# Patient Record
Sex: FEMALE | Race: WHITE | NOT HISPANIC OR LATINO | Employment: OTHER | URBAN - METROPOLITAN AREA
[De-identification: names, ages, dates, MRNs, and addresses within clinical notes are randomized per-mention and may not be internally consistent; named-entity substitution may affect disease eponyms.]

---

## 2017-01-03 ENCOUNTER — OFFICE VISIT (OUTPATIENT)
Dept: NEUROSURGERY | Facility: CLINIC | Age: 62
End: 2017-01-03

## 2017-01-03 VITALS
BODY MASS INDEX: 31.01 KG/M2 | SYSTOLIC BLOOD PRESSURE: 110 MMHG | HEIGHT: 55 IN | TEMPERATURE: 97.7 F | DIASTOLIC BLOOD PRESSURE: 58 MMHG | WEIGHT: 134 LBS

## 2017-01-03 DIAGNOSIS — M48.061 SPINAL STENOSIS OF LUMBAR REGION: ICD-10-CM

## 2017-01-03 DIAGNOSIS — M53.9 MULTILEVEL DEGENERATIVE DISC DISEASE: Primary | ICD-10-CM

## 2017-01-03 PROCEDURE — 99203 OFFICE O/P NEW LOW 30 MIN: CPT | Performed by: NEUROLOGICAL SURGERY

## 2017-01-03 NOTE — MR AVS SNAPSHOT
Karen Rubio   1/3/2017 1:15 PM   Office Visit    Dept Phone:  [phone]   Encounter #:  [id number]    Provider:  Loyd Page MD   Department:  [facility]                Your Full Care Plan              Today's Medication Changes          These changes are accurate as of: 1/3/17  3:01 PM.  If you have any questions, ask your nurse or doctor.               Medication(s)that have changed:     gabapentin 300 MG capsule   Commonly known as:  NEURONTIN   TAKE 1 CAPSULE BY MOUTH EVERY 8 HOURS   What changed:  Another medication with the same name was removed. Continue taking this medication, and follow the directions you see here.   Changed by:  SIMA Hammonds         Stop taking medication(s)listed here:     tiZANidine 2 MG tablet   Commonly known as:  ZANAFLEX   Stopped by:  Loyd Page MD                      Your Updated Medication List          This list is accurate as of: 1/3/17  3:01 PM.  Always use your most recent med list.                ALPRAZolam 1 MG tablet   Commonly known as:  XANAX       ARSENIO ASPIRIN EC LOW DOSE 81 MG EC tablet   Generic drug:  aspirin       Blood Pressure Monitor Automat device       cetirizine 10 MG tablet   Commonly known as:  zyrTEC   Take 1 tablet by mouth daily.       escitalopram 10 MG tablet   Commonly known as:  LEXAPRO   Take 1 tablet by mouth daily.       fluticasone 50 MCG/ACT nasal spray   Commonly known as:  FLONASE   1 spray into each nostril Daily.       gabapentin 300 MG capsule   Commonly known as:  NEURONTIN       hydrocortisone 5 MG tablet   Commonly known as:  CORTEF   Take 3 tab in am and 2 tab in pm       ibuprofen 600 MG tablet   Commonly known as:  ADVIL,MOTRIN   Take 1 tablet by mouth Every 6 (Six) Hours As Needed for mild pain (1-3).       levothyroxine 50 MCG tablet   Commonly known as:  SYNTHROID, LEVOTHROID   Take 1 tablet by mouth daily.       lisinopril 10 MG  tablet   Commonly known as:  PRINIVIL,ZESTRIL   Take 1 tablet by mouth daily.       meloxicam 7.5 MG tablet   Commonly known as:  MOBIC   Take 1 tablet by mouth 2 (two) times a day.       montelukast 10 MG tablet   Commonly known as:  SINGULAIR       ondansetron ODT 4 MG disintegrating tablet   Commonly known as:  ZOFRAN-ODT       ONE TOUCH ULTRA TEST test strip   Generic drug:  glucose blood       ONETOUCH DELICA LANCETS 33G misc       oxyCODONE-acetaminophen  MG per tablet   Commonly known as:  PERCOCET       pantoprazole 40 MG EC tablet   Commonly known as:  PROTONIX       simvastatin 20 MG tablet   Commonly known as:  ZOCOR       SITagliptin 100 MG tablet   Commonly known as:  JANUVIA   Take 1 tablet by mouth Daily.       tolterodine 2 MG tablet   Commonly known as:  DETROL       traMADol 50 MG tablet   Commonly known as:  ULTRAM       vitamin D 13148 UNITS capsule capsule   Commonly known as:  ERGOCALCIFEROL   Take 1 capsule by mouth every 7 days.       zolpidem 5 MG tablet   Commonly known as:  AMBIEN               We Performed the Following     CT lumbar spine without contrast     EMG & Nerve Conduction Test     IR MYELOGRAM LUMBAR SPINE     XR hip 1 vw bilateral     XR Spine Lumbar Flex & Ext       You Were Diagnosed With        Codes Comments    Multilevel degenerative disc disease    -  Primary ICD-10-CM: M53.9  ICD-9-CM: 722.6     Spinal stenosis of lumbar region     ICD-10-CM: M48.06  ICD-9-CM: 724.02       Medications to be Given to You by a Medical Professional     Due       Frequency    1/12/2017 cyanocobalamin injection 1,000 mcg  Every 28 Days      Instructions     None    Patient Instructions History      Upcoming Appointments     Visit Type Date Time Department    NEW PATIENT 1/3/2017  1:15 PM [facility]    FOLLOW UP 1/5/2017 12:30 PM [facility]      [facility] Signup     [facility] allows you to send messages to your doctor, view your test results, renew your  "prescriptions, schedule appointments, and more. To sign up, go to [email or web address] and click on the Sign Up Now link in the New User? box. Enter your [employer] Activation Code exactly as it appears below along with the last four digits of your Social Security Number and your Date of Birth () to complete the sign-up process. If you do not sign up before the expiration date, you must request a new code.    [employer] Activation Code: [email or web address]  Expires: 2017  3:01 PM    If you have questions, you can email [email or web address] or call [phone] to talk to our [employer] staff. Remember, [employer] is NOT to be used for urgent needs. For medical emergencies, dial 911.               Other Info from Your Visit           Your Appointments     2017 12:30 PM EST   Follow Up with Aleah THOMAS MD   [facility] (--)    [address] [place] [id number] [phone]           Arrive 15 minutes prior to appointment.              Allergies     Valium [Diazepam]        Reason for Visit     Back Pain           Vital Signs     Blood Pressure Temperature Height    110/58 (BP Location: Left arm, Patient Position: Sitting, Cuff Size: Adult) 97.7 °F (36.5 °C) (Temporal Artery ) 52\" (132.1 cm)    Weight Body Mass Index Smoking Status    134 lb (60.8 kg) 34.84 kg/m2 Never Smoker      Problems and Diagnoses Noted     Multilevel degenerative disc disease    Narrowing of spinal canal            "

## 2017-01-03 NOTE — PROGRESS NOTES
Karen Valencialey  1955  [id number]      Chief Complaint   Patient presents with   • Back Pain       HISTORY OF PRESENT ILLNESS:  This is a 61-year-old female who presents with a history of fibromyalgia but more recently pain in her back rating into her right hip and into the anterior thigh.  She has had diagnostic studies which show the presence of degenerative osteoarthritis throughout the lumbar spine.  There is suggestive of disc protrusion L3/4 to the right associated with the deformity of the vertebral body.     Past Medical History   Diagnosis Date   • Acute bronchitis    • Acute head trauma    • Arthritis    • Arthropathy of ankle and foot    • Arthropathy, multiple sites    • Asthma    • Cancer    • Chronic bronchitis    • Chronic constipation    • Cough    • Depression    • Diabetes mellitus    • Dysuria    • Enthesopathy of ankle/tarsus    • Facial injury    • Fibromyalgia    • Fracture of left fibula    • Gall stone    • GERD (gastroesophageal reflux disease)    • Gout    • Hammer toe    • Heart disease    • Hyperlipidemia    • Idiopathic peripheral neuropathy    • Joint pain of right hip on movement    • Lumbago    • Migraine    • Mixed sensory-motor polyneuropathy    • Myalgia and myositis    • Nausea and vomiting    • Osteoporosis    • Ovarian cancer    • Ovarian cyst    • Overweight    • Pleurisy    • Pustule    • Rash      Last Impression: 09 Feb 2015  Likely contact dermatitis secondary to new clothing- rash has similarity to shingles but distribution does not correspond with shingles. Trial of topical steroid, sent in clobetasol. Refer to derm for f/u if no improvement or worsening of rash.   • Recurrent boils    • Sinusitis, acute    • Stress fracture    • Thyroid disorder    • Tibial plateau fracture, left    • Urinary incontinence    • UTI (urinary tract infection)    • Vitamin B12 deficiency       · Last Impression: 29 Jan 2015  Check labs as ordered. Gave pt vitamin b12 IM in office  today. Will continue monthly shots to maintain level.  Phuong Almaguer (Internal Medicine)   • Vitamin D deficiency        Past Surgical History   Procedure Laterality Date   • Other surgical history       anastomosis of gallbladder   • Ovary surgery     • Tubal abdominal ligation         Family History   Problem Relation Age of Onset   • Hyperlipidemia Mother    • Hypertension Mother    • Thyroid disease Mother    • Hyperlipidemia Father    • Hypertension Father    • Kidney disease Sister    • Thyroid disease Sister    • Thyroid disease Son    • Other Son      Suicide   • Heart attack Other    • Arthritis Other    • Cancer Other    • Mental illness Other    • Migraines Other    • Hyperlipidemia Other    • Hypertension Other    • Stroke Other        Social History     Social History   • Marital status:      Spouse name: N/A   • Number of children: N/A   • Years of education: N/A     Occupational History   • Not on file.     Social History Main Topics   • Smoking status: Never Smoker   • Smokeless tobacco: Never Used   • Alcohol use No   • Drug use: No   • Sexual activity: Not on file     Other Topics Concern   • Not on file     Social History Narrative       Allergies   Allergen Reactions   • Valium [Diazepam]          Current Outpatient Prescriptions:   •  ALPRAZolam (XANAX) 1 MG tablet, Take  by mouth 3 (three) times a day., Disp: , Rfl:   •  aspirin (ARSENIO ASPIRIN EC LOW DOSE) 81 MG EC tablet, Take  by mouth daily., Disp: , Rfl:   •  Blood Pressure Monitoring (BLOOD PRESSURE MONITOR AUTOMAT) device, , Disp: , Rfl:   •  cetirizine (ZyrTEC) 10 MG tablet, Take 1 tablet by mouth daily., Disp: 30 tablet, Rfl: 11  •  escitalopram (LEXAPRO) 10 MG tablet, Take 1 tablet by mouth daily., Disp: 30 tablet, Rfl: 11  •  fluticasone (FLONASE) 50 MCG/ACT nasal spray, 1 spray into each nostril Daily., Disp: 1 each, Rfl: 2  •  gabapentin (NEURONTIN) 300 MG capsule, TAKE 1 CAPSULE BY MOUTH EVERY 8 HOURS, Disp: , Rfl: 1  •   glucose blood (ONE TOUCH ULTRA TEST) test strip, 1-2 times a day, Disp: , Rfl:   •  hydrocortisone (CORTEF) 5 MG tablet, Take 3 tab in am and 2 tab in pm, Disp: 150 tablet, Rfl: 5  •  ibuprofen (ADVIL,MOTRIN) 600 MG tablet, Take 1 tablet by mouth Every 6 (Six) Hours As Needed for mild pain (1-3)., Disp: 30 tablet, Rfl: 5  •  levothyroxine (SYNTHROID, LEVOTHROID) 50 MCG tablet, Take 1 tablet by mouth daily., Disp: 30 tablet, Rfl: 11  •  lisinopril (PRINIVIL,ZESTRIL) 10 MG tablet, Take 1 tablet by mouth daily., Disp: 30 tablet, Rfl: 11  •  meloxicam (MOBIC) 7.5 MG tablet, Take 1 tablet by mouth 2 (two) times a day., Disp: 20 tablet, Rfl: 2  •  montelukast (SINGULAIR) 10 MG tablet, Take  by mouth., Disp: , Rfl:   •  ondansetron ODT (ZOFRAN-ODT) 4 MG disintegrating tablet, Take  by mouth., Disp: , Rfl:   •  ONETOUCH DELICA LANCETS 33G misc, Checks x 1 / day, Disp: , Rfl:   •  oxyCODONE-acetaminophen (PERCOCET)  MG per tablet, Take  by mouth., Disp: , Rfl:   •  pantoprazole (PROTONIX) 40 MG EC tablet, Take  by mouth., Disp: , Rfl:   •  simvastatin (ZOCOR) 20 MG tablet, Take  by mouth., Disp: , Rfl:   •  sitaGLIPtin (JANUVIA) 100 MG tablet, Take 1 tablet by mouth Daily., Disp: 30 tablet, Rfl: 11  •  tolterodine (DETROL) 2 MG tablet, Take  by mouth., Disp: , Rfl:   •  traMADol (ULTRAM) 50 MG tablet, TAKE 1 TABLET BY MOUTH EVERY 8 HOURS, Disp: , Rfl: 1  •  vitamin D (ERGOCALCIFEROL) 98462 UNITS capsule capsule, Take 1 capsule by mouth every 7 days., Disp: 4 capsule, Rfl: 11  •  zolpidem (AMBIEN) 5 MG tablet, TK 1 T PO HS, Disp: , Rfl: 1    Current Facility-Administered Medications:   •  cyanocobalamin injection 1,000 mcg, 1,000 mcg, Intramuscular, Q28 Days, Aleah THOMAS MD, 1,000 mcg at 07/15/16 1159  •  cyanocobalamin injection 1,000 mcg, 1,000 mcg, Intramuscular, Q28 Days, Aleah THOMAS MD, 1,000 mcg at 12/16/16 1603    Review of Systems   Constitutional: Positive for activity change, appetite change, chills,  "diaphoresis, fatigue and fever.   HENT: Positive for hearing loss, nosebleeds and voice change.    Eyes: Positive for redness and visual disturbance.   Gastrointestinal: Positive for constipation and nausea.   Endocrine: Positive for cold intolerance, heat intolerance, polydipsia and polyuria.   Genitourinary: Positive for dyspareunia, frequency and urgency.   Allergic/Immunologic: Positive for environmental allergies.   Neurological: Positive for dizziness, speech difficulty, weakness and numbness.   Hematological: Bruises/bleeds easily.   Psychiatric/Behavioral: Positive for agitation, confusion, decreased concentration and dysphoric mood. The patient is nervous/anxious.    All other systems reviewed and are negative.      Neurological Examination:    Vitals:    01/03/17 1405   BP: 110/58   BP Location: Left arm   Patient Position: Sitting   Cuff Size: Adult   Temp: 97.7 °F (36.5 °C)   TempSrc: Temporal Artery    Weight: 134 lb (60.8 kg)   Height: 52\" (132.1 cm)       Mental status/speech: The patient is alert and oriented.  Speech is clear without aphysia or dysarthria.  No overt cognitive deficits.    Cranial nerve examination:    Olfaction: Smell is intact.  Vision: Vision is intact without visual field abnormalities.  Funduscopic examination is normal.  No pupillary irregularity.  Ocular motor examination: The extraocular muscles are intact.  There is no diplopia.  The pupil is round and reactive to both light and accommodation.  There is no nystagmus.  Facial movement/sensation: There is no facial weakness.  Sensation is intact in the first, second, and third divisions of the trigeminal nerve.  The corneal reflex is intact.  Auditory: Hearing is intact to finger rub bilaterally.  Cranial nerves IX, X, XI, XII: Phonation is normal.  No dysphagia.  Tongue is protruded in the midline without atrophy.  The gag reflex is intact.  Shoulder shrug is normal.    Musculoligamentous ligamentous examination: [She has " marketed decreased range of motion of lumbar spine.  Straight leg raising [place] flip test are positive on the right side.  Her strength however is quite good and there is no reflex asymmetry.  This includes patellar reflex.  She has spotty sensory loss bilaterally. ]    Medical Decision Making:     Diagnostic Data Set:  MRI has revealed. atypical L2-L3 bony fusion with a bony fragment projecting posteriorly compromising the neural canal resulting in mild centralspinal stenosis and bilateral lateral recess stenosis. There is also  compromise of the right lateral recess at L4-L5. There has been no change since the previous examination of 03/23/2016       Assessment:  [ Nerve root entrapment L4/L5]          Recommendations:  [ Further studies are warranted to include myelogram post-Monogram CT scan and EMG and NCV and flexion extension x-rays.  It would appear that she has advanced degenerative osteoarthritis throughout the lumbar spine.  I believe the symptoms that she is having at this time of pain in her right hip may be the results and of disc protrusion and compromise at L3/4 and L4/5.  We will review the studies and keep you informed.]        I greatly appreciate the opportunity to see and evaluate this individual.  If you have questions or concerns regarding issues that I may have overlooked please call me at any time: [phone].  Colt Page M.D.  [facility]  [address]  [place] [address]    Scribed for Loyd Page MD by Sandoval Briseno CMA. 1/3/2017  2:09 PM    I have read and concur with the information provided by the scribe.

## 2017-01-03 NOTE — LETTER
January 3, 2017     SIMA Hammonds  [address] [facility]  [address]  [facility] [id number]    Patient: Karen Rubio   YOB: 1955   Date of Visit: 1/3/2017       Dear SIMA Dubon:    Karen Rubio was in my office today. Below is a copy of my note.    If you have questions, please do not hesitate to call me. I look forward to following Karen along with you.         Sincerely,        Loyd Page MD        CC: No Recipients    Karen Rubio  1955  [id number]      Chief Complaint   Patient presents with   • Back Pain       HISTORY OF PRESENT ILLNESS:  This is a 61-year-old female who presents with a history of fibromyalgia but more recently pain in her back rating into her right hip and into the anterior thigh.  She has had diagnostic studies which show the presence of degenerative osteoarthritis throughout the lumbar spine.  There is suggestive of disc protrusion L3/4 to the right associated with the deformity of the vertebral body.     Past Medical History   Diagnosis Date   • Acute bronchitis    • Acute head trauma    • Arthritis    • Arthropathy of ankle and foot    • Arthropathy, multiple sites    • Asthma    • Cancer    • Chronic bronchitis    • Chronic constipation    • Cough    • Depression    • Diabetes mellitus    • Dysuria    • Enthesopathy of ankle/tarsus    • Facial injury    • Fibromyalgia    • Fracture of left fibula    • Gall stone    • GERD (gastroesophageal reflux disease)    • Gout    • Hammer toe    • Heart disease    • Hyperlipidemia    • Idiopathic peripheral neuropathy    • Joint pain of right hip on movement    • Lumbago    • Migraine    • Mixed sensory-motor polyneuropathy    • Myalgia and myositis    • Nausea and vomiting    • Osteoporosis    • Ovarian cancer    • Ovarian cyst    • Overweight    • Pleurisy    • Pustule    • Rash      Last Impression: 09 Feb 2015  Likely contact dermatitis secondary to new clothing- rash has similarity to  shingles but distribution does not correspond with shingles. Trial of topical steroid, sent in clobetasol. Refer to derm for f/u if no improvement or worsening of rash.   • Recurrent boils    • Sinusitis, acute    • Stress fracture    • Thyroid disorder    • Tibial plateau fracture, left    • Urinary incontinence    • UTI (urinary tract infection)    • Vitamin B12 deficiency       · Last Impression: 29 Jan 2015  Check labs as ordered. Gave pt vitamin b12 IM in office today. Will continue monthly shots to maintain level.  Phuong Almaguer (Internal Medicine)   • Vitamin D deficiency        Past Surgical History   Procedure Laterality Date   • Other surgical history       anastomosis of gallbladder   • Ovary surgery     • Tubal abdominal ligation         Family History   Problem Relation Age of Onset   • Hyperlipidemia Mother    • Hypertension Mother    • Thyroid disease Mother    • Hyperlipidemia Father    • Hypertension Father    • Kidney disease Sister    • Thyroid disease Sister    • Thyroid disease Son    • Other Son      Suicide   • Heart attack Other    • Arthritis Other    • Cancer Other    • Mental illness Other    • Migraines Other    • Hyperlipidemia Other    • Hypertension Other    • Stroke Other        Social History     Social History   • Marital status:      Spouse name: N/A   • Number of children: N/A   • Years of education: N/A     Occupational History   • Not on file.     Social History Main Topics   • Smoking status: Never Smoker   • Smokeless tobacco: Never Used   • Alcohol use No   • Drug use: No   • Sexual activity: Not on file     Other Topics Concern   • Not on file     Social History Narrative       Allergies   Allergen Reactions   • Valium [Diazepam]          Current Outpatient Prescriptions:   •  ALPRAZolam (XANAX) 1 MG tablet, Take  by mouth 3 (three) times a day., Disp: , Rfl:   •  aspirin (ARSENIO ASPIRIN EC LOW DOSE) 81 MG EC tablet, Take  by mouth daily., Disp: , Rfl:   •  Blood  Pressure Monitoring (BLOOD PRESSURE MONITOR AUTOMAT) device, , Disp: , Rfl:   •  cetirizine (ZyrTEC) 10 MG tablet, Take 1 tablet by mouth daily., Disp: 30 tablet, Rfl: 11  •  escitalopram (LEXAPRO) 10 MG tablet, Take 1 tablet by mouth daily., Disp: 30 tablet, Rfl: 11  •  fluticasone (FLONASE) 50 MCG/ACT nasal spray, 1 spray into each nostril Daily., Disp: 1 each, Rfl: 2  •  gabapentin (NEURONTIN) 300 MG capsule, TAKE 1 CAPSULE BY MOUTH EVERY 8 HOURS, Disp: , Rfl: 1  •  glucose blood (ONE TOUCH ULTRA TEST) test strip, 1-2 times a day, Disp: , Rfl:   •  hydrocortisone (CORTEF) 5 MG tablet, Take 3 tab in am and 2 tab in pm, Disp: 150 tablet, Rfl: 5  •  ibuprofen (ADVIL,MOTRIN) 600 MG tablet, Take 1 tablet by mouth Every 6 (Six) Hours As Needed for mild pain (1-3)., Disp: 30 tablet, Rfl: 5  •  levothyroxine (SYNTHROID, LEVOTHROID) 50 MCG tablet, Take 1 tablet by mouth daily., Disp: 30 tablet, Rfl: 11  •  lisinopril (PRINIVIL,ZESTRIL) 10 MG tablet, Take 1 tablet by mouth daily., Disp: 30 tablet, Rfl: 11  •  meloxicam (MOBIC) 7.5 MG tablet, Take 1 tablet by mouth 2 (two) times a day., Disp: 20 tablet, Rfl: 2  •  montelukast (SINGULAIR) 10 MG tablet, Take  by mouth., Disp: , Rfl:   •  ondansetron ODT (ZOFRAN-ODT) 4 MG disintegrating tablet, Take  by mouth., Disp: , Rfl:   •  ONETOUCH DELICA LANCETS 33G misc, Checks x 1 / day, Disp: , Rfl:   •  oxyCODONE-acetaminophen (PERCOCET)  MG per tablet, Take  by mouth., Disp: , Rfl:   •  pantoprazole (PROTONIX) 40 MG EC tablet, Take  by mouth., Disp: , Rfl:   •  simvastatin (ZOCOR) 20 MG tablet, Take  by mouth., Disp: , Rfl:   •  sitaGLIPtin (JANUVIA) 100 MG tablet, Take 1 tablet by mouth Daily., Disp: 30 tablet, Rfl: 11  •  tolterodine (DETROL) 2 MG tablet, Take  by mouth., Disp: , Rfl:   •  traMADol (ULTRAM) 50 MG tablet, TAKE 1 TABLET BY MOUTH EVERY 8 HOURS, Disp: , Rfl: 1  •  vitamin D (ERGOCALCIFEROL) 22215 UNITS capsule capsule, Take 1 capsule by mouth every 7 days.,  "Disp: 4 capsule, Rfl: 11  •  zolpidem (AMBIEN) 5 MG tablet, TK 1 T PO HS, Disp: , Rfl: 1    Current Facility-Administered Medications:   •  cyanocobalamin injection 1,000 mcg, 1,000 mcg, Intramuscular, Q28 Days, Aleah THOMAS MD, 1,000 mcg at 07/15/16 1159  •  cyanocobalamin injection 1,000 mcg, 1,000 mcg, Intramuscular, Q28 Days, Aleah THOMAS MD, 1,000 mcg at 12/16/16 1603    Review of Systems   Constitutional: Positive for activity change, appetite change, chills, diaphoresis, fatigue and fever.   HENT: Positive for hearing loss, nosebleeds and voice change.    Eyes: Positive for redness and visual disturbance.   Gastrointestinal: Positive for constipation and nausea.   Endocrine: Positive for cold intolerance, heat intolerance, polydipsia and polyuria.   Genitourinary: Positive for dyspareunia, frequency and urgency.   Allergic/Immunologic: Positive for environmental allergies.   Neurological: Positive for dizziness, speech difficulty, weakness and numbness.   Hematological: Bruises/bleeds easily.   Psychiatric/Behavioral: Positive for agitation, confusion, decreased concentration and dysphoric mood. The patient is nervous/anxious.    All other systems reviewed and are negative.      Neurological Examination:    Vitals:    01/03/17 1405   BP: 110/58   BP Location: Left arm   Patient Position: Sitting   Cuff Size: Adult   Temp: 97.7 °F (36.5 °C)   TempSrc: Temporal Artery    Weight: 134 lb (60.8 kg)   Height: 52\" (132.1 cm)       Mental status/speech: The patient is alert and oriented.  Speech is clear without aphysia or dysarthria.  No overt cognitive deficits.    Cranial nerve examination:    Olfaction: Smell is intact.  Vision: Vision is intact without visual field abnormalities.  Funduscopic examination is normal.  No pupillary irregularity.  Ocular motor examination: The extraocular muscles are intact.  There is no diplopia.  The pupil is round and reactive to both light and accommodation.  There is no " nystagmus.  Facial movement/sensation: There is no facial weakness.  Sensation is intact in the first, second, and third divisions of the trigeminal nerve.  The corneal reflex is intact.  Auditory: Hearing is intact to finger rub bilaterally.  Cranial nerves IX, X, XI, XII: Phonation is normal.  No dysphagia.  Tongue is protruded in the midline without atrophy.  The gag reflex is intact.  Shoulder shrug is normal.    Musculoligamentous ligamentous examination: [She has marketed decreased range of motion of lumbar spine.  Straight leg raising Canon flip test are positive on the right side.  Her strength however is quite good and there is no reflex asymmetry.  This includes patellar reflex.  She has spotty sensory loss bilaterally. ]    Medical Decision Making:     Diagnostic Data Set:  MRI has revealed. atypical L2-L3 bony fusion with a bony fragment projecting posteriorly compromising the neural canal resulting in mild centralspinal stenosis and bilateral lateral recess stenosis. There is also  compromise of the right lateral recess at L4-L5. There has been no change since the previous examination of 03/23/2016       Assessment:  [ Nerve root entrapment L4/L5]          Recommendations:  [ Further studies are warranted to include myelogram post-Monogram CT scan and EMG and NCV and flexion extension x-rays.  It would appear that she has advanced degenerative osteoarthritis throughout the lumbar spine.  I believe the symptoms that she is having at this time of pain in her right hip may be the results and of disc protrusion and compromise at L3/4 and L4/5.  We will review the studies and keep you informed.]        I greatly appreciate the opportunity to see and evaluate this individual.  If you have questions or concerns regarding issues that I may have overlooked please call me at any time: [phone].  Colt Pgae M.D.  [facility]  [address] [place] [address].  [place] [address]    Scribed for  Loyd Page MD by Sandoval Briseno CMA. 1/3/2017  2:09 PM    I have read and concur with the information provided by the scribe.

## 2017-01-20 ENCOUNTER — HOSPITAL ENCOUNTER (OUTPATIENT)
Dept: INTERVENTIONAL RADIOLOGY/VASCULAR | Facility: HOSPITAL | Age: 62
End: 2017-01-20
Attending: NEUROLOGICAL SURGERY

## 2017-01-20 ENCOUNTER — APPOINTMENT (OUTPATIENT)
Dept: NEUROLOGY | Facility: HOSPITAL | Age: 62
End: 2017-01-20
Attending: NEUROLOGICAL SURGERY

## 2017-02-16 ENCOUNTER — OFFICE VISIT (OUTPATIENT)
Dept: FAMILY MEDICINE CLINIC | Facility: CLINIC | Age: 62
End: 2017-02-16

## 2017-02-16 VITALS
DIASTOLIC BLOOD PRESSURE: 50 MMHG | HEIGHT: 55 IN | OXYGEN SATURATION: 98 % | BODY MASS INDEX: 30.78 KG/M2 | HEART RATE: 68 BPM | WEIGHT: 133 LBS | SYSTOLIC BLOOD PRESSURE: 90 MMHG

## 2017-02-16 DIAGNOSIS — R53.83 FATIGUE, UNSPECIFIED TYPE: Primary | ICD-10-CM

## 2017-02-16 DIAGNOSIS — F41.9 ANXIETY: ICD-10-CM

## 2017-02-16 DIAGNOSIS — R06.2 WHEEZING: ICD-10-CM

## 2017-02-16 PROCEDURE — 99213 OFFICE O/P EST LOW 20 MIN: CPT | Performed by: PHYSICIAN ASSISTANT

## 2017-02-16 RX ORDER — ALBUTEROL SULFATE 90 UG/1
2 AEROSOL, METERED RESPIRATORY (INHALATION) EVERY 4 HOURS PRN
Qty: 3.7 G | Refills: 6 | Status: SHIPPED | OUTPATIENT
Start: 2017-02-16 | End: 2017-08-30 | Stop reason: SDUPTHER

## 2017-02-16 NOTE — PROGRESS NOTES
Subjective   Karen Rubio is a 61 y.o. female    History of Present Illness  Age is 59-year-old white female comes in complaining of some fatigue, low blood pressure, she states her blood pressures been running about 90/50, she states she has no energy during the day like to back off some of her blood pressure medication.  She has some dizziness when she gets up out of a chair.    Patient comes in for follow-up of anxiety needs refill of Xanax, doing well medication meds working well patient is stable.    Patient also comes in for follow-up occasional wheezing, she has history of mild persistent asthma, takes albuterol inhaler with her she only uses it maybe once twice every 2 months needs refill patient is stable      The following portions of the patient's history were reviewed and updated as appropriate: allergies, current medications, past social history and problem list    Review of Systems   Constitutional: Positive for fatigue. Negative for appetite change, diaphoresis, fever and unexpected weight change.   Eyes: Negative for visual disturbance.   Respiratory: Negative for chest tightness and shortness of breath.    Cardiovascular: Negative for chest pain, palpitations and leg swelling.   Gastrointestinal: Negative for diarrhea, nausea and vomiting.   Endocrine: Negative for polydipsia, polyphagia and polyuria.   Skin: Negative for color change.   Neurological: Negative for dizziness, weakness, light-headedness and numbness.       Objective     Vitals:    02/16/17 1003   BP: 90/50   Pulse: 68   SpO2: 98%       Physical Exam   Constitutional: She appears well-developed and well-nourished.   Neck: No JVD present.   Cardiovascular: Normal rate, regular rhythm, normal heart sounds, intact distal pulses and normal pulses.    No murmur heard.  Pulmonary/Chest: Effort normal and breath sounds normal. No respiratory distress.   Abdominal: Soft. Bowel sounds are normal. There is no hepatosplenomegaly. There is no  tenderness.   Musculoskeletal: She exhibits no edema.   Skin: Skin is warm and dry.   Nursing note and vitals reviewed.      Assessment/Plan     Diagnoses and all orders for this visit:    Fatigue, unspecified type    Anxiety    Wheezing  -     albuterol (PROVENTIL HFA;VENTOLIN HFA) 108 (90 BASE) MCG/ACT inhaler; Inhale 2 puffs Every 4 (Four) Hours As Needed for wheezing.     #1 DC lisinopril 10 mg, check blood pressure every 2-3 days, it pressure increases please call office and we will start lisinopril.    #2 refill Xanax 1 mg 13 times a day dispense 90×4 refills    #3 albuterol inhaler use as directed when necessary ×5 refills

## 2017-02-24 ENCOUNTER — OFFICE VISIT (OUTPATIENT)
Dept: ENDOCRINOLOGY | Facility: CLINIC | Age: 62
End: 2017-02-24

## 2017-02-24 VITALS
HEIGHT: 55 IN | OXYGEN SATURATION: 98 % | SYSTOLIC BLOOD PRESSURE: 122 MMHG | BODY MASS INDEX: 31.66 KG/M2 | DIASTOLIC BLOOD PRESSURE: 66 MMHG | WEIGHT: 136.8 LBS | HEART RATE: 70 BPM

## 2017-02-24 DIAGNOSIS — E55.9 VITAMIN D DEFICIENCY: ICD-10-CM

## 2017-02-24 DIAGNOSIS — E27.1 ADDISON'S DISEASE (HCC): ICD-10-CM

## 2017-02-24 DIAGNOSIS — E53.8 COBALAMIN DEFICIENCY: ICD-10-CM

## 2017-02-24 DIAGNOSIS — E53.8 VITAMIN B12 DEFICIENCY: ICD-10-CM

## 2017-02-24 DIAGNOSIS — E61.1 IRON DEFICIENCY: ICD-10-CM

## 2017-02-24 DIAGNOSIS — E89.0 POSTABLATIVE HYPOTHYROIDISM: ICD-10-CM

## 2017-02-24 DIAGNOSIS — IMO0001 UNCONTROLLED TYPE 2 DIABETES MELLITUS WITHOUT COMPLICATION, WITHOUT LONG-TERM CURRENT USE OF INSULIN: Primary | ICD-10-CM

## 2017-02-24 LAB
25(OH)D3 SERPL-MCNC: 53.2 NG/ML
ANION GAP SERPL CALCULATED.3IONS-SCNC: 3 MMOL/L (ref 3–11)
BASOPHILS # BLD AUTO: 0.01 10*3/MM3 (ref 0–0.2)
BASOPHILS NFR BLD AUTO: 0.1 % (ref 0–1)
BUN BLD-MCNC: 13 MG/DL (ref 9–23)
BUN/CREAT SERPL: 13 (ref 7–25)
CALCIUM SPEC-SCNC: 9.4 MG/DL (ref 8.7–10.4)
CHLORIDE SERPL-SCNC: 105 MMOL/L (ref 99–109)
CO2 SERPL-SCNC: 27 MMOL/L (ref 20–31)
CREAT BLD-MCNC: 1 MG/DL (ref 0.6–1.3)
DEPRECATED RDW RBC AUTO: 39 FL (ref 37–54)
EOSINOPHIL # BLD AUTO: 0.26 10*3/MM3 (ref 0.1–0.3)
EOSINOPHIL NFR BLD AUTO: 3.2 % (ref 0–3)
ERYTHROCYTE [DISTWIDTH] IN BLOOD BY AUTOMATED COUNT: 12.5 % (ref 11.3–14.5)
GFR SERPL CREATININE-BSD FRML MDRD: 56 ML/MIN/1.73
GLUCOSE BLD-MCNC: 176 MG/DL (ref 70–100)
GLUCOSE BLDC GLUCOMTR-MCNC: 170 MG/DL (ref 70–130)
HBA1C MFR BLD: 6.3 %
HCT VFR BLD AUTO: 36.3 % (ref 34.5–44)
HGB BLD-MCNC: 12.5 G/DL (ref 11.5–15.5)
IMM GRANULOCYTES # BLD: 0.07 10*3/MM3 (ref 0–0.03)
IMM GRANULOCYTES NFR BLD: 0.9 % (ref 0–0.6)
IRON 24H UR-MRATE: 83 MCG/DL (ref 50–175)
LYMPHOCYTES # BLD AUTO: 1.66 10*3/MM3 (ref 0.6–4.8)
LYMPHOCYTES NFR BLD AUTO: 20.2 % (ref 24–44)
MCH RBC QN AUTO: 29.8 PG (ref 27–31)
MCHC RBC AUTO-ENTMCNC: 34.4 G/DL (ref 32–36)
MCV RBC AUTO: 86.6 FL (ref 80–99)
MONOCYTES # BLD AUTO: 0.58 10*3/MM3 (ref 0–1)
MONOCYTES NFR BLD AUTO: 7.1 % (ref 0–12)
NEUTROPHILS # BLD AUTO: 5.64 10*3/MM3 (ref 1.5–8.3)
NEUTROPHILS NFR BLD AUTO: 68.5 % (ref 41–71)
PLATELET # BLD AUTO: 201 10*3/MM3 (ref 150–450)
PMV BLD AUTO: 9.5 FL (ref 6–12)
POTASSIUM BLD-SCNC: 5.9 MMOL/L (ref 3.5–5.5)
RBC # BLD AUTO: 4.19 10*6/MM3 (ref 3.89–5.14)
SODIUM BLD-SCNC: 135 MMOL/L (ref 132–146)
T4 FREE SERPL-MCNC: 0.94 NG/DL (ref 0.89–1.76)
TSH SERPL DL<=0.05 MIU/L-ACNC: 1.68 MIU/ML (ref 0.35–5.35)
WBC NRBC COR # BLD: 8.22 10*3/MM3 (ref 3.5–10.8)

## 2017-02-24 PROCEDURE — 80048 BASIC METABOLIC PNL TOTAL CA: CPT | Performed by: INTERNAL MEDICINE

## 2017-02-24 PROCEDURE — 99215 OFFICE O/P EST HI 40 MIN: CPT | Performed by: INTERNAL MEDICINE

## 2017-02-24 PROCEDURE — 84443 ASSAY THYROID STIM HORMONE: CPT | Performed by: INTERNAL MEDICINE

## 2017-02-24 PROCEDURE — 96372 THER/PROPH/DIAG INJ SC/IM: CPT | Performed by: INTERNAL MEDICINE

## 2017-02-24 PROCEDURE — 84439 ASSAY OF FREE THYROXINE: CPT | Performed by: INTERNAL MEDICINE

## 2017-02-24 PROCEDURE — 85025 COMPLETE CBC W/AUTO DIFF WBC: CPT | Performed by: INTERNAL MEDICINE

## 2017-02-24 PROCEDURE — 83540 ASSAY OF IRON: CPT | Performed by: INTERNAL MEDICINE

## 2017-02-24 PROCEDURE — 82947 ASSAY GLUCOSE BLOOD QUANT: CPT | Performed by: INTERNAL MEDICINE

## 2017-02-24 PROCEDURE — 83036 HEMOGLOBIN GLYCOSYLATED A1C: CPT | Performed by: INTERNAL MEDICINE

## 2017-02-24 PROCEDURE — 82306 VITAMIN D 25 HYDROXY: CPT | Performed by: INTERNAL MEDICINE

## 2017-02-24 RX ORDER — CYANOCOBALAMIN 1000 UG/ML
INJECTION, SOLUTION INTRAMUSCULAR; SUBCUTANEOUS
Qty: 10 ML | Refills: 1 | Status: SHIPPED | OUTPATIENT
Start: 2017-02-24 | End: 2017-10-19 | Stop reason: SDUPTHER

## 2017-02-24 RX ORDER — CYANOCOBALAMIN 1000 UG/ML
1000 INJECTION, SOLUTION INTRAMUSCULAR; SUBCUTANEOUS
Status: DISCONTINUED | OUTPATIENT
Start: 2017-02-24 | End: 2019-06-28

## 2017-02-24 RX ADMIN — CYANOCOBALAMIN 1000 MCG: 1000 INJECTION, SOLUTION INTRAMUSCULAR; SUBCUTANEOUS at 16:28

## 2017-02-24 NOTE — PROGRESS NOTES
Chief complaint  Diabetes (F/u for type 2 diabetes, pt stated she has not been checking blood sugars)    Subjective   Karen Rubio is a 61 y.o. female is here today for follow-up.  Follow-up for hypothyroidism and Shawn's disease. HTN and Depression. Diabetes.   Adrenal insufficiency / [place]'s disease dx in 2004. She had significant hyperpigmentation and fatigue.   She was taking hydrocortisone and fludrocortisone. On presentation in Oct 2014 and following visit BP was elevated 180//100 and I have d/c fludrocortisone. BP stabilized. She reported low-normal BP and she stopped lisinopril.     Diabetes mellitus type 2   She stopped metformin because of diarrhea. It was well controlled with diet and last visit A1C was 9.2  I have started Januvia 100 mg daily. She reported low glucose almost daily, falls asleep during the       Hypothyroidism postablative. S/p I-131 therapy at age 14. She is taking levothyroxine 50 mcg.     She c/o significant fatigue and lethargy. Back pain and right hip pain.   Reported feeling awful, frequent headaches,  generalized pain. C/o sever back pain and spasms in the muscles. On further questioning we have discovered that she was not taking her usual hydrocortisone dose as supposed to and only took 10 mg in am and 5 mg in pm.        Diabetes         Medications    Current Outpatient Prescriptions:   •  albuterol (PROVENTIL HFA;VENTOLIN HFA) 108 (90 BASE) MCG/ACT inhaler, Inhale 2 puffs Every 4 (Four) Hours As Needed for wheezing., Disp: 3.7 g, Rfl: 6  •  ALPRAZolam (XANAX) 1 MG tablet, Take 1 mg by mouth 3 (Three) Times a Day., Disp: , Rfl:   •  aspirin (ARSENIO ASPIRIN EC LOW DOSE) 81 MG EC tablet, Take 81 mg by mouth Daily., Disp: , Rfl:   •  cetirizine (ZyrTEC) 10 MG tablet, Take 1 tablet by mouth daily., Disp: 30 tablet, Rfl: 11  •  escitalopram (LEXAPRO) 10 MG tablet, Take 1 tablet by mouth daily., Disp: 30 tablet, Rfl: 11  •  fluticasone (FLONASE) 50 MCG/ACT nasal spray, 1  spray into each nostril Daily., Disp: 1 each, Rfl: 2  •  gabapentin (NEURONTIN) 300 MG capsule, TAKE 1 CAPSULE BY MOUTH EVERY 8 HOURS, Disp: , Rfl: 1  •  glucose blood (ONE TOUCH ULTRA TEST) test strip, 1 each by Other route As Needed. 1-2 times a day, Disp: , Rfl:   •  hydrocortisone (CORTEF) 5 MG tablet, Take 3 tab in am and 2 tab in pm (Patient taking differently: Take 5 mg by mouth See Admin Instructions. Take 3 tab in am and 2 tab in pm ), Disp: 150 tablet, Rfl: 5  •  ibuprofen (ADVIL,MOTRIN) 600 MG tablet, Take 1 tablet by mouth Every 6 (Six) Hours As Needed for mild pain (1-3)., Disp: 30 tablet, Rfl: 5  •  levothyroxine (SYNTHROID, LEVOTHROID) 50 MCG tablet, Take 1 tablet by mouth daily., Disp: 30 tablet, Rfl: 11  •  meloxicam (MOBIC) 7.5 MG tablet, Take 1 tablet by mouth 2 (two) times a day., Disp: 20 tablet, Rfl: 2  •  montelukast (SINGULAIR) 10 MG tablet, Take 10 mg by mouth Every Night., Disp: , Rfl:   •  ondansetron ODT (ZOFRAN-ODT) 4 MG disintegrating tablet, Take  by mouth., Disp: , Rfl:   •  ONETOUCH DELICA LANCETS 33G misc, Checks x 1 / day, Disp: , Rfl:   •  oxyCODONE-acetaminophen (PERCOCET)  MG per tablet, Take 1 tablet by mouth 4 (Four) Times a Day., Disp: , Rfl:   •  pantoprazole (PROTONIX) 40 MG EC tablet, Take 40 mg by mouth Daily., Disp: , Rfl:   •  simvastatin (ZOCOR) 20 MG tablet, Take 20 mg by mouth Every Night., Disp: , Rfl:   •  sitaGLIPtin (JANUVIA) 100 MG tablet, Take 1 tablet by mouth Daily., Disp: 30 tablet, Rfl: 11  •  tolterodine (DETROL) 2 MG tablet, Take 2 mg by mouth., Disp: , Rfl:   •  traMADol (ULTRAM) 50 MG tablet, TAKE 1 TABLET BY MOUTH EVERY 8 HOURS, Disp: , Rfl: 1  •  vitamin D (ERGOCALCIFEROL) 67500 UNITS capsule capsule, Take 1 capsule by mouth every 7 days., Disp: 4 capsule, Rfl: 11  •  zolpidem (AMBIEN) 5 MG tablet, TK 1 T PO HS, Disp: , Rfl: 1    Current Facility-Administered Medications:   •  cyanocobalamin injection 1,000 mcg, 1,000 mcg, Intramuscular, Q28  "Aleah Coon MD, 1,000 mcg at 12/16/16 1603    PMH  The following portions of the patient's history were reviewed and updated as appropriate: allergies, current medications, past family history, past medical history, past social history, past surgical history and problem list.    Review of systems  Review of Systems   Constitutional: Positive for appetite change (improved appetite. ) and unexpected weight change (weight gain). Negative for activity change, chills and diaphoresis.   HENT: Positive for postnasal drip, rhinorrhea, sinus pressure and sore throat. Negative for congestion, ear pain, facial swelling, hearing loss, trouble swallowing and voice change.    Eyes: Negative.  Negative for redness, itching and visual disturbance.   Respiratory: Positive for cough and shortness of breath.    Cardiovascular: Negative for palpitations and leg swelling.   Gastrointestinal: Negative for abdominal distention, abdominal pain, constipation, diarrhea, nausea and vomiting.   Endocrine:        As listed in HPI   Genitourinary: Negative.    Musculoskeletal: Positive for arthralgias and back pain (radiating to the left leg. ). Negative for joint swelling, myalgias, neck pain and neck stiffness.   Skin: Negative.    Allergic/Immunologic: Negative.    Neurological: Positive for light-headedness and numbness. Negative for syncope.   Hematological: Negative.    Psychiatric/Behavioral: Positive for decreased concentration.   All other systems reviewed and are negative.      Physical exam  Objective   Blood pressure 122/66, pulse 70, height 52.5\" (133.4 cm), weight 136 lb 12.8 oz (62.1 kg), SpO2 98 %.   Physical Exam   Constitutional: She is oriented to person, place, and time. She appears well-developed and well-nourished.   HENT:   Head: Normocephalic and atraumatic.   Nose: Mucosal edema present. Right sinus exhibits maxillary sinus tenderness. Left sinus exhibits maxillary sinus tenderness.   Mouth/Throat: Mucous " membranes are normal. Oropharyngeal exudate present.   Eyes: Conjunctivae are normal.   Neck: No thyromegaly present.   The neck is supple and symmetric   Cardiovascular: Normal rate, regular rhythm and normal heart sounds.    Pulmonary/Chest: Effort normal and breath sounds normal.   Musculoskeletal: She exhibits deformity. She exhibits no edema.   Increase curvature of the spine.    Lymphadenopathy:     She has no cervical adenopathy.   Neurological: She is alert and oriented to person, place, and time.   Skin: Skin is warm and dry. No rash noted.   Psychiatric: She has a normal mood and affect. Thought content normal.   Vitals reviewed.        LABS AND IMAGING  Office Visit on 02/24/2017   Component Date Value Ref Range Status   • Glucose 02/24/2017 170* 70 - 130 mg/dL Final   • Hemoglobin A1C 02/24/2017 6.3  % Final           Assessment  Assessment/Plan   1. Uncontrolled type 2 diabetes mellitus without complication, without long-term current use of insulin    2. Cobalamin deficiency    3. Vitamin D deficiency    4. Charlton's disease    5. Postablative hypothyroidism      Orders Placed This Encounter   Procedures   • POC Glucose Fingerstick   • POC Glycosylated Hemoglobin (Hb A1C)     Plan  New instructions on the medication given. Most likely her sx were secondary to under replacing hydrocortisone. Low BP and hypoglycemia are sx of adrenal insufficiency.     -Hydrocortisone dose: 15 mg in am and 10 mg in pm.   -Levothyroxine 50 mcg a day.  Repeat labs today. meds refilled   -Vit D refilled. B12 increased to injections every 2 weeks.   -Diabetes with frequent hypoglycemia.  -decrease  Januvia 50 mg a day.   - glucometer provided to her.   - she will call with hyperglycemia or hypoglycemia. Also if her BP will not improve after increase in hydrocortisone we may have to restart hydrocortisone.     Follow-up in 2-3 months.   23     min  of  43 min face-to-face visit time spent for coordination of care and  counselling regarding identified problems as outlined in the objective, assessment and discussion portions of the documentation.

## 2017-02-24 NOTE — PATIENT INSTRUCTIONS
-Hydrocortisone dose: 15 mg in am and 10 mg in pm. (3 tablets in the morning and 2 tablets in the afternoon)  For the event of illness double the dose of medication to 6 tablets in the morning and 4 tablets in the afternoon.     -Levothyroxine 50 mcg a day.      - Decrease Januvia to 50 mg daily.

## 2017-03-07 ENCOUNTER — TELEPHONE (OUTPATIENT)
Dept: ENDOCRINOLOGY | Facility: CLINIC | Age: 62
End: 2017-03-07

## 2017-03-07 NOTE — TELEPHONE ENCOUNTER
----- Message from Khadijah Arana sent at 3/7/2017  4:10 PM EST -----  Contact: PATIENT  RX REQUEST: SOMETHING FOR SINUS INFECTION-MUCUS IN NOSE AND CHEST, COUGH  PHARMACY: [facility] [place]

## 2017-03-09 ENCOUNTER — TELEPHONE (OUTPATIENT)
Dept: FAMILY MEDICINE CLINIC | Facility: CLINIC | Age: 62
End: 2017-03-09

## 2017-03-09 RX ORDER — AZITHROMYCIN 250 MG/1
TABLET, FILM COATED ORAL
Qty: 6 TABLET | Refills: 0 | Status: SHIPPED | OUTPATIENT
Start: 2017-03-09 | End: 2017-04-17

## 2017-03-09 NOTE — TELEPHONE ENCOUNTER
----- Message from Princess Jeffrey sent at 3/9/2017  1:35 PM EST -----  Contact: PATIENT  SHE HAS HAD A SINUS INFECTION SINCE Sunday AND SEAMS TO BE GETTING WORSE, IS THERE ANY WAY SHE CAN GET A ZPACK CALLED INTO [facility] [phone].  LAST VISIT 2/16/17 WITH SHANNAN

## 2017-03-15 ENCOUNTER — TELEPHONE (OUTPATIENT)
Dept: FAMILY MEDICINE CLINIC | Facility: CLINIC | Age: 62
End: 2017-03-15

## 2017-03-15 RX ORDER — MONTELUKAST SODIUM 10 MG/1
10 TABLET ORAL NIGHTLY
Qty: 30 TABLET | Refills: 11 | Status: SHIPPED | OUTPATIENT
Start: 2017-03-15 | End: 2017-10-19 | Stop reason: SDUPTHER

## 2017-04-17 ENCOUNTER — OFFICE VISIT (OUTPATIENT)
Dept: FAMILY MEDICINE CLINIC | Facility: CLINIC | Age: 62
End: 2017-04-17

## 2017-04-17 VITALS
HEIGHT: 55 IN | RESPIRATION RATE: 18 BRPM | HEART RATE: 78 BPM | TEMPERATURE: 97.9 F | DIASTOLIC BLOOD PRESSURE: 80 MMHG | WEIGHT: 137 LBS | SYSTOLIC BLOOD PRESSURE: 140 MMHG | BODY MASS INDEX: 31.7 KG/M2

## 2017-04-17 DIAGNOSIS — I10 ESSENTIAL HYPERTENSION: Primary | ICD-10-CM

## 2017-04-17 DIAGNOSIS — F41.9 ANXIETY: ICD-10-CM

## 2017-04-17 PROCEDURE — 99213 OFFICE O/P EST LOW 20 MIN: CPT | Performed by: PHYSICIAN ASSISTANT

## 2017-04-17 RX ORDER — LISINOPRIL 20 MG/1
20 TABLET ORAL DAILY
Qty: 30 TABLET | Refills: 11 | Status: SHIPPED | OUTPATIENT
Start: 2017-04-17 | End: 2018-05-03

## 2017-04-17 NOTE — PROGRESS NOTES
Subjective   Karen Rubio is a 61 y.o. female    History of Present Illness  Patient is 61-year-old white female comes in follow-up of hypertension, no shortness breath no chest pain doing well no problems or complaints patient is stable    Patient comes in follow-up of anxiety, symptoms are controlled medication Xanax 1 mg 3 times a day meds working well no problems or complaints.  Meds working well.  No SI/HI      The following portions of the patient's history were reviewed and updated as appropriate: allergies, current medications, past social history and problem list    Review of Systems   Constitutional: Negative for activity change, appetite change, fatigue and unexpected weight change.   Respiratory: Negative for cough, chest tightness and shortness of breath.    Cardiovascular: Negative for chest pain, palpitations and leg swelling.   Gastrointestinal: Negative for nausea.   Skin: Negative for color change and rash.   Neurological: Negative for dizziness, syncope, weakness and headaches.   Psychiatric/Behavioral: Positive for decreased concentration. Negative for agitation, behavioral problems, confusion, dysphoric mood and sleep disturbance. The patient is not nervous/anxious and is not hyperactive.        Objective     Vitals:    04/17/17 1414   BP: 140/80   Pulse: 78   Resp: 18   Temp: 97.9 °F (36.6 °C)       Physical Exam   Constitutional: She appears well-developed and well-nourished.   Neck: No JVD present.   Cardiovascular: Normal rate, regular rhythm, normal heart sounds, intact distal pulses and normal pulses.    No murmur heard.  Pulmonary/Chest: Effort normal and breath sounds normal. No respiratory distress.   Abdominal: Soft. Bowel sounds are normal. There is no hepatosplenomegaly. There is no tenderness.   Musculoskeletal: She exhibits no edema.   Skin: Skin is warm and dry.   Nursing note and vitals reviewed.      Assessment/Plan     Diagnoses and all orders for this visit:    Essential  hypertension  -     lisinopril (PRINIVIL,ZESTRIL) 20 MG tablet; Take 1 tablet by mouth Daily.    Anxiety     #1 lisinopril 20 mg 1 by mouth everyday dispense 30×11 refills    #2 refill Xanax 1 mg 13 times a day dispense 90  ×5 refills    Follow-up in 3 months

## 2017-04-24 ENCOUNTER — TELEPHONE (OUTPATIENT)
Dept: FAMILY MEDICINE CLINIC | Facility: CLINIC | Age: 62
End: 2017-04-24

## 2017-04-24 NOTE — TELEPHONE ENCOUNTER
Krzysztof checked. Per Harjit Steele. Medication called into pharmacy with 2 refills. Patient notified.
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
## 2017-04-27 ENCOUNTER — TRANSCRIBE ORDERS (OUTPATIENT)
Dept: FAMILY MEDICINE CLINIC | Facility: CLINIC | Age: 62
End: 2017-04-27

## 2017-04-27 DIAGNOSIS — M54.50 PAIN OF LUMBOSACRAL SPINE: ICD-10-CM

## 2017-04-27 DIAGNOSIS — M25.552 HIP PAIN, BILATERAL: Primary | ICD-10-CM

## 2017-04-27 DIAGNOSIS — M25.551 HIP PAIN, BILATERAL: Primary | ICD-10-CM

## 2017-04-28 DIAGNOSIS — E89.0 POSTABLATIVE HYPOTHYROIDISM: ICD-10-CM

## 2017-04-28 RX ORDER — LEVOTHYROXINE SODIUM 0.05 MG/1
50 TABLET ORAL DAILY
Qty: 30 TABLET | Refills: 3 | Status: SHIPPED | OUTPATIENT
Start: 2017-04-28 | End: 2017-06-15 | Stop reason: SDUPTHER

## 2017-05-22 RX ORDER — IBUPROFEN 600 MG/1
TABLET ORAL
Qty: 30 TABLET | Refills: 5 | Status: SHIPPED | OUTPATIENT
Start: 2017-05-22 | End: 2017-06-15 | Stop reason: SDUPTHER

## 2017-06-05 ENCOUNTER — TELEPHONE (OUTPATIENT)
Dept: FAMILY MEDICINE CLINIC | Facility: CLINIC | Age: 62
End: 2017-06-05

## 2017-06-05 RX ORDER — AMOXICILLIN 500 MG/1
500 TABLET, FILM COATED ORAL 3 TIMES DAILY
Qty: 30 TABLET | Refills: 0 | Status: SHIPPED | OUTPATIENT
Start: 2017-06-05 | End: 2017-06-15

## 2017-06-15 ENCOUNTER — OFFICE VISIT (OUTPATIENT)
Dept: FAMILY MEDICINE CLINIC | Facility: CLINIC | Age: 62
End: 2017-06-15

## 2017-06-15 ENCOUNTER — OFFICE VISIT (OUTPATIENT)
Dept: ENDOCRINOLOGY | Facility: CLINIC | Age: 62
End: 2017-06-15

## 2017-06-15 VITALS
DIASTOLIC BLOOD PRESSURE: 82 MMHG | WEIGHT: 139 LBS | RESPIRATION RATE: 16 BRPM | BODY MASS INDEX: 32.17 KG/M2 | HEIGHT: 55 IN | SYSTOLIC BLOOD PRESSURE: 126 MMHG | HEART RATE: 76 BPM | OXYGEN SATURATION: 97 %

## 2017-06-15 VITALS
BODY MASS INDEX: 32.12 KG/M2 | WEIGHT: 138.8 LBS | OXYGEN SATURATION: 98 % | SYSTOLIC BLOOD PRESSURE: 130 MMHG | DIASTOLIC BLOOD PRESSURE: 76 MMHG | HEIGHT: 55 IN | HEART RATE: 67 BPM

## 2017-06-15 DIAGNOSIS — M25.551 PAIN OF RIGHT HIP JOINT: Primary | ICD-10-CM

## 2017-06-15 DIAGNOSIS — E27.1 ADDISON'S DISEASE (HCC): Primary | ICD-10-CM

## 2017-06-15 DIAGNOSIS — IMO0001 UNCONTROLLED TYPE 2 DIABETES MELLITUS WITHOUT COMPLICATION, WITHOUT LONG-TERM CURRENT USE OF INSULIN: ICD-10-CM

## 2017-06-15 DIAGNOSIS — Z87.39 HISTORY OF OSTEOPOROSIS: ICD-10-CM

## 2017-06-15 DIAGNOSIS — E89.0 POSTABLATIVE HYPOTHYROIDISM: ICD-10-CM

## 2017-06-15 DIAGNOSIS — E55.9 VITAMIN D DEFICIENCY: ICD-10-CM

## 2017-06-15 DIAGNOSIS — F41.9 ANXIETY: ICD-10-CM

## 2017-06-15 LAB
GLUCOSE BLDC GLUCOMTR-MCNC: 222 MG/DL (ref 70–130)
HBA1C MFR BLD: 7.5 %

## 2017-06-15 PROCEDURE — 83036 HEMOGLOBIN GLYCOSYLATED A1C: CPT | Performed by: INTERNAL MEDICINE

## 2017-06-15 PROCEDURE — 99215 OFFICE O/P EST HI 40 MIN: CPT | Performed by: INTERNAL MEDICINE

## 2017-06-15 PROCEDURE — 82947 ASSAY GLUCOSE BLOOD QUANT: CPT | Performed by: INTERNAL MEDICINE

## 2017-06-15 PROCEDURE — 99213 OFFICE O/P EST LOW 20 MIN: CPT | Performed by: PHYSICIAN ASSISTANT

## 2017-06-15 RX ORDER — SIMVASTATIN 20 MG
20 TABLET ORAL NIGHTLY
Qty: 30 TABLET | Refills: 11 | Status: SHIPPED | OUTPATIENT
Start: 2017-06-15 | End: 2018-07-22 | Stop reason: SDUPTHER

## 2017-06-15 RX ORDER — IBUPROFEN 600 MG/1
600 TABLET ORAL EVERY 6 HOURS PRN
Qty: 90 TABLET | Refills: 5 | Status: SHIPPED | OUTPATIENT
Start: 2017-06-15 | End: 2018-01-11 | Stop reason: SDUPTHER

## 2017-06-15 RX ORDER — ESCITALOPRAM OXALATE 10 MG/1
10 TABLET ORAL DAILY
Qty: 30 TABLET | Refills: 11 | Status: SHIPPED | OUTPATIENT
Start: 2017-06-15 | End: 2018-01-11 | Stop reason: SDUPTHER

## 2017-06-15 RX ORDER — LEVOTHYROXINE SODIUM 0.05 MG/1
50 TABLET ORAL DAILY
Qty: 30 TABLET | Refills: 11 | Status: SHIPPED | OUTPATIENT
Start: 2017-06-15 | End: 2018-07-02 | Stop reason: SDUPTHER

## 2017-06-15 NOTE — PATIENT INSTRUCTIONS
-Hydrocortisone dose: 15 mg in am and 5 mg in pm.   -restart Levothyroxine 50 mcg a day.  -Januvia 50 mg a day.     Melatonin - supplement for sleep.   Come for fasting labs.

## 2017-06-15 NOTE — PROGRESS NOTES
Chief complaint  Diabetes (F/u type II diabetes, vitaminD, and addisons disease, pt states that she doesnt check sugars everyday but says sugar has been low.)    Subjective   Karen Rubio is a 61 y.o. female is here today for follow-up.  Follow-up for hypothyroidism and Alpena's disease. HTN and Depression. Diabetes.   Adrenal insufficiency / Alpena's disease dx in 2004. She had significant hyperpigmentation and fatigue.   She was taking hydrocortisone and fludrocortisone. On presentation in Oct 2014 and following visit BP was elevated 180//100 and I have d/c fludrocortisone. BP stabilized.   Last visit she was only taking total of 15 mg hydrocortisone daily and had multiple sx suggestive of adrenal insufficiency, headache hypoglycemia and low BP> I have increase hydrocortisone to 15 mg/10 mg and sx resolved. Now she is concerned about weight gain. Still has fatigue, glucose is higher. BP normal.   Diabetes mellitus type 2   She stopped metformin because of diarrhea. It was well controlled with diet and last visit A1C was 9.2  I have started Januvia 50  mg daily. No hypoglycemia.     Hypothyroidism postablative. S/p I-131 therapy at age 14. She is taking levothyroxine 50 mcg. She stopped thyroid medication, forgot to refill it.   I explained that some of her sx could be related to the lack of thyroid medication (weight gain, fatigue).    Also c/o sleep disturbances, cant fall asleep till morning. Then tired all day. CPAP mask doesnt fit [place]. C/o continuous diarrhea, back pain.     Feels better after B12 injections, last one was 2 weeks ago and she always feels that sx recur towards the end of the 2nd week. Injections are ecvery 2 weeks now.            Diabetes         Medications    Current Outpatient Prescriptions:   •  albuterol (PROVENTIL HFA;VENTOLIN HFA) 108 (90 BASE) MCG/ACT inhaler, Inhale 2 puffs Every 4 (Four) Hours As Needed for wheezing., Disp: 3.7 g, Rfl: 6  •  ALPRAZolam (XANAX) 1 MG  tablet, Take 1 mg by mouth 3 (Three) Times a Day., Disp: , Rfl:   •  aspirin (ARSENIO ASPIRIN EC LOW DOSE) 81 MG EC tablet, Take 81 mg by mouth Daily., Disp: , Rfl:   •  cetirizine (ZyrTEC) 10 MG tablet, Take 1 tablet by mouth daily., Disp: 30 tablet, Rfl: 11  •  cyanocobalamin 1000 MCG/ML injection, 1000 mcg every 2 weeks, for 10 injections, then contninue once a month., Disp: 10 mL, Rfl: 1  •  escitalopram (LEXAPRO) 10 MG tablet, Take 1 tablet by mouth daily., Disp: 30 tablet, Rfl: 11  •  fluticasone (FLONASE) 50 MCG/ACT nasal spray, 1 spray into each nostril Daily., Disp: 1 each, Rfl: 2  •  gabapentin (NEURONTIN) 300 MG capsule, TAKE 1 CAPSULE BY MOUTH EVERY 8 HOURS, Disp: , Rfl: 1  •  glucose blood (ONE TOUCH ULTRA TEST) test strip, 1 each by Other route As Needed. 1-2 times a day, Disp: , Rfl:   •  hydrocortisone (CORTEF) 5 MG tablet, Take 3 tab in am and 2 tab in pm (Patient taking differently: Take 5 mg by mouth See Admin Instructions. Take 3 tab in am and 2 tab in pm ), Disp: 150 tablet, Rfl: 5  •  ibuprofen (ADVIL,MOTRIN) 600 MG tablet, TAKE ONE TABLET BY MOUTH EVERY 6 HOURS AS NEEDED FOR MILD PAIN (1-3), Disp: 30 tablet, Rfl: 5  •  levothyroxine (SYNTHROID, LEVOTHROID) 50 MCG tablet, Take 1 tablet by mouth Daily., Disp: 30 tablet, Rfl: 3  •  meloxicam (MOBIC) 7.5 MG tablet, Take 1 tablet by mouth 2 (two) times a day., Disp: 20 tablet, Rfl: 2  •  montelukast (SINGULAIR) 10 MG tablet, Take 1 tablet by mouth Every Night., Disp: 30 tablet, Rfl: 11  •  ondansetron ODT (ZOFRAN-ODT) 4 MG disintegrating tablet, Take  by mouth., Disp: , Rfl:   •  ONETOUCH DELICA LANCETS 33G misc, Checks x 1 / day, Disp: , Rfl:   •  oxyCODONE-acetaminophen (PERCOCET)  MG per tablet, Take 1 tablet by mouth 4 (Four) Times a Day., Disp: , Rfl:   •  SITagliptin (JANUVIA) 50 MG tablet, Take 1 tablet by mouth Daily., Disp: 30 tablet, Rfl: 11  •  tolterodine (DETROL) 2 MG tablet, Take 2 mg by mouth., Disp: , Rfl:   •  vitamin D  (ERGOCALCIFEROL) 23153 UNITS capsule capsule, Take 1 capsule by mouth every 7 days., Disp: 4 capsule, Rfl: 11  •  lisinopril (PRINIVIL,ZESTRIL) 20 MG tablet, Take 1 tablet by mouth Daily., Disp: 30 tablet, Rfl: 11  •  pantoprazole (PROTONIX) 40 MG EC tablet, Take 40 mg by mouth Daily., Disp: , Rfl:   •  simvastatin (ZOCOR) 20 MG tablet, Take 20 mg by mouth Every Night., Disp: , Rfl:   •  traMADol (ULTRAM) 50 MG tablet, TAKE 1 TABLET BY MOUTH EVERY 8 HOURS, Disp: , Rfl: 1  •  zolpidem (AMBIEN) 5 MG tablet, TK 1 T PO HS, Disp: , Rfl: 1    Current Facility-Administered Medications:   •  cyanocobalamin injection 1,000 mcg, 1,000 mcg, Intramuscular, Q28 Days, Aleah THOMAS MD, 1,000 mcg at 12/16/16 1603  •  cyanocobalamin injection 1,000 mcg, 1,000 mcg, Intramuscular, Q28 Days, Aleah THOMAS MD, 1,000 mcg at 02/24/17 1628    PMH  The following portions of the patient's history were reviewed and updated as appropriate: allergies, current medications, past family history, past medical history, past social history, past surgical history and problem list.    Review of systems  Review of Systems   Constitutional: Positive for appetite change (improved appetite. ) and unexpected weight change (weight gain). Negative for activity change, chills and diaphoresis.   HENT: Negative for congestion, ear pain, facial swelling, hearing loss, trouble swallowing and voice change.    Eyes: Negative.  Negative for redness, itching and visual disturbance.   Cardiovascular: Negative for palpitations and leg swelling.   Gastrointestinal: Positive for diarrhea. Negative for abdominal distention, abdominal pain, constipation, nausea and vomiting.   Endocrine:        As listed in HPI   Genitourinary: Negative.    Musculoskeletal: Positive for arthralgias and back pain (radiating to the left leg. ). Negative for joint swelling, myalgias, neck pain and neck stiffness.   Skin: Negative.    Allergic/Immunologic: Negative.    Neurological: Positive  "for light-headedness and numbness. Negative for syncope.   Hematological: Negative.    Psychiatric/Behavioral: Positive for decreased concentration (memory loss) and sleep disturbance.   All other systems reviewed and are negative.      Physical exam  Objective   Blood pressure 130/76, pulse 67, height 52.5\" (133.4 cm), weight 138 lb 12.8 oz (63 kg), SpO2 98 %.   Physical Exam   Constitutional: She is oriented to person, place, and time. She appears well-developed and well-nourished.   HENT:   Head: Normocephalic and atraumatic.   Nose: Mucosal edema present. Right sinus exhibits maxillary sinus tenderness. Left sinus exhibits maxillary sinus tenderness.   Mouth/Throat: Mucous membranes are normal. Oropharyngeal exudate present.   Eyes: Conjunctivae are normal.   Neck: No thyromegaly present.   The neck is supple and symmetric   Cardiovascular: Normal rate, regular rhythm and normal heart sounds.    Pulmonary/Chest: Effort normal and breath sounds normal.   Musculoskeletal: She exhibits deformity. She exhibits no edema.   Increase curvature of the spine.    Lymphadenopathy:     She has no cervical adenopathy.   Neurological: She is alert and oriented to person, place, and time.   Skin: Skin is warm and dry. No rash noted.   Psychiatric: She has a normal mood and affect. Thought content normal.   Vitals reviewed.        LABS AND IMAGING  Office Visit on 06/15/2017   Component Date Value Ref Range Status   • Glucose 06/15/2017 222* 70 - 130 mg/dL Final   • Hemoglobin A1C 06/15/2017 7.5  % Final           Assessment  Assessment/Plan   1. Mildred's disease    2. Uncontrolled type 2 diabetes mellitus without complication, without long-term current use of insulin    3. Postablative hypothyroidism    4. History of osteoporosis    5. Vitamin D deficiency      Orders Placed This Encounter   Procedures   • POC Glucose Fingerstick   • POC Glycosylated Hemoglobin (Hb A1C)     Plan  New instructions on the medication given. "     -Hydrocortisone dose: 15 mg in am and 5 mg in pm.   -restart Levothyroxine 50 mcg a day.  -meds refilled  -recommended to continue simvastatin 20 mg, the benefits are higher than the risks of the low dose. LDL was 120.   Weight gain is likely because of the stopping thyroid medication, but I have reduced hydrocortisone by 1 tablet.     - fasting labs ordered, she just had a donut prior to coming here. Need 6 hours of fast.    - Januvia 50 mg a day.   Diarrhea - I will defer evaluation to primary care physician and she sees Javan Steele later today.     Follow-up in 2-3 months.   23     min  of  43 min face-to-face visit time spent for coordination of care and counselling regarding identified problems as outlined in the objective, assessment and discussion portions of the documentation.

## 2017-06-15 NOTE — PROGRESS NOTES
Subjective   Karen Rubio is a 61 y.o. female    History of Present Illness  Patient pleasant 59-year-old white female comes in complaining of right hip pain she needs refill on ibuprofen, states medications working well for hip pain she has large amount stiffness and pain in right hip radiating to lower leg.  Pain is worse when sitting standing for long periods of time.  Patient's pain is worse with standing.    Patient also complaining increased depression and anxiety, she's been taking her Xanax 3 times a day blocked increase to 4 for a brief time, she's under a lot of stress with her family she also would like to start back on Lexapro she denies any SI/HI but she's under large amount of stress.  Decreased mood crying frequently.  She states she has no energy no motivation.  She has appointment in 2 weeks to see a psychiatrist at .          The following portions of the patient's history were reviewed and updated as appropriate: allergies, current medications, past social history and problem list    Review of Systems   Constitutional: Negative for appetite change, diaphoresis, fatigue and unexpected weight change.   Eyes: Negative for visual disturbance.   Respiratory: Negative for cough, chest tightness and shortness of breath.    Cardiovascular: Negative for chest pain, palpitations and leg swelling.   Gastrointestinal: Negative for diarrhea, nausea and vomiting.   Endocrine: Negative for polydipsia, polyphagia and polyuria.   Musculoskeletal:        Hip pain   Skin: Negative for color change and rash.   Neurological: Negative for dizziness, syncope, weakness, light-headedness, numbness and headaches.       Objective     Vitals:    06/15/17 1451   BP: 126/82   Pulse: 76   Resp: 16   SpO2: 97%       Physical Exam   Constitutional: She appears well-developed and well-nourished.   Neck: Neck supple. No JVD present. No thyromegaly present.   Cardiovascular: Normal rate, regular rhythm, normal heart sounds,  intact distal pulses and normal pulses.    No murmur heard.  Pulmonary/Chest: Effort normal and breath sounds normal. No respiratory distress.   Abdominal: Soft. Bowel sounds are normal. There is no hepatosplenomegaly. There is no tenderness.   Musculoskeletal: She exhibits no edema.   Lymphadenopathy:     She has no cervical adenopathy.   Neurological: No sensory deficit.   Skin: Skin is warm and dry. She is not diaphoretic.   Nursing note and vitals reviewed.      Assessment/Plan     Diagnoses and all orders for this visit:    Pain of right hip joint  -     ibuprofen (ADVIL,MOTRIN) 600 MG tablet; Take 1 tablet by mouth Every 6 (Six) Hours As Needed for Mild Pain (1-3).    Anxiety  -     escitalopram (LEXAPRO) 10 MG tablet; Take 1 tablet by mouth Daily.    Spoke with Dr. Thomas, we have agreed to do a short of taking the Xanax 1 mg 4 times a day, encouraged her make sure that she keeps her appointment with the psychiatrist and 2 weeks, she was given prescription Xanax 1 mg 4 times a day dispense 120 2 refills, she was warned of abuse potential.    Follow up in one month

## 2017-07-03 DIAGNOSIS — E55.9 VITAMIN D DEFICIENCY: ICD-10-CM

## 2017-07-03 RX ORDER — CETIRIZINE HYDROCHLORIDE 10 MG/1
TABLET ORAL
Qty: 30 TABLET | Refills: 11 | Status: SHIPPED | OUTPATIENT
Start: 2017-07-03 | End: 2018-01-11 | Stop reason: SDUPTHER

## 2017-07-05 RX ORDER — ERGOCALCIFEROL 1.25 MG/1
CAPSULE ORAL
Qty: 4 CAPSULE | Refills: 11 | Status: SHIPPED | OUTPATIENT
Start: 2017-07-05 | End: 2018-06-13 | Stop reason: SDUPTHER

## 2017-07-06 ENCOUNTER — TELEPHONE (OUTPATIENT)
Dept: FAMILY MEDICINE CLINIC | Facility: CLINIC | Age: 62
End: 2017-07-06

## 2017-07-06 NOTE — TELEPHONE ENCOUNTER
----- Message from Bharti Davis sent at 7/6/2017  3:55 PM EDT -----  Patient would like something stronger for her tooth, the amoxicillin is not working. Mouth is swollen and can't get it pulled until the infection is gone. Please send medication to [facility] [place].  Allergic to Penicillin and Codeine..  Thanks,  Bharti..

## 2017-07-11 ENCOUNTER — LAB (OUTPATIENT)
Dept: LAB | Facility: HOSPITAL | Age: 62
End: 2017-07-11

## 2017-07-11 DIAGNOSIS — E89.0 POSTABLATIVE HYPOTHYROIDISM: ICD-10-CM

## 2017-07-11 DIAGNOSIS — IMO0001 UNCONTROLLED TYPE 2 DIABETES MELLITUS WITHOUT COMPLICATION, WITHOUT LONG-TERM CURRENT USE OF INSULIN: ICD-10-CM

## 2017-07-11 DIAGNOSIS — E55.9 VITAMIN D DEFICIENCY: ICD-10-CM

## 2017-07-11 DIAGNOSIS — E27.1 ADDISON'S DISEASE (HCC): ICD-10-CM

## 2017-07-11 LAB
25(OH)D3 SERPL-MCNC: 58.4 NG/ML
ALBUMIN SERPL-MCNC: 4 G/DL (ref 3.2–4.8)
ALBUMIN/GLOB SERPL: 1.6 G/DL (ref 1.5–2.5)
ALP SERPL-CCNC: 76 U/L (ref 25–100)
ALT SERPL W P-5'-P-CCNC: 25 U/L (ref 7–40)
ANION GAP SERPL CALCULATED.3IONS-SCNC: 5 MMOL/L (ref 3–11)
ARTICHOKE IGE QN: 98 MG/DL (ref 0–130)
AST SERPL-CCNC: 31 U/L (ref 0–33)
BILIRUB SERPL-MCNC: 0.8 MG/DL (ref 0.3–1.2)
BUN BLD-MCNC: 8 MG/DL (ref 9–23)
BUN/CREAT SERPL: 10 (ref 7–25)
CALCIUM SPEC-SCNC: 9.8 MG/DL (ref 8.7–10.4)
CHLORIDE SERPL-SCNC: 111 MMOL/L (ref 99–109)
CHOLEST SERPL-MCNC: 171 MG/DL (ref 0–200)
CO2 SERPL-SCNC: 28 MMOL/L (ref 20–31)
CREAT BLD-MCNC: 0.8 MG/DL (ref 0.6–1.3)
GFR SERPL CREATININE-BSD FRML MDRD: 73 ML/MIN/1.73
GLOBULIN UR ELPH-MCNC: 2.5 GM/DL
GLUCOSE BLD-MCNC: 104 MG/DL (ref 70–100)
HDLC SERPL-MCNC: 55 MG/DL (ref 40–60)
POTASSIUM BLD-SCNC: 5.2 MMOL/L (ref 3.5–5.5)
PROT SERPL-MCNC: 6.5 G/DL (ref 5.7–8.2)
SODIUM BLD-SCNC: 144 MMOL/L (ref 132–146)
T4 FREE SERPL-MCNC: 0.95 NG/DL (ref 0.89–1.76)
TRIGL SERPL-MCNC: 134 MG/DL (ref 0–150)
TSH SERPL DL<=0.05 MIU/L-ACNC: 1.62 MIU/ML (ref 0.35–5.35)
VIT B12 BLD-MCNC: 1099 PG/ML (ref 211–911)

## 2017-07-11 PROCEDURE — 82607 VITAMIN B-12: CPT | Performed by: INTERNAL MEDICINE

## 2017-07-11 PROCEDURE — 80061 LIPID PANEL: CPT | Performed by: INTERNAL MEDICINE

## 2017-07-11 PROCEDURE — 80053 COMPREHEN METABOLIC PANEL: CPT | Performed by: INTERNAL MEDICINE

## 2017-07-11 PROCEDURE — 84439 ASSAY OF FREE THYROXINE: CPT | Performed by: INTERNAL MEDICINE

## 2017-07-11 PROCEDURE — 82306 VITAMIN D 25 HYDROXY: CPT | Performed by: INTERNAL MEDICINE

## 2017-07-11 PROCEDURE — 84443 ASSAY THYROID STIM HORMONE: CPT | Performed by: INTERNAL MEDICINE

## 2017-07-17 ENCOUNTER — TELEPHONE (OUTPATIENT)
Dept: ENDOCRINOLOGY | Facility: CLINIC | Age: 62
End: 2017-07-17

## 2017-07-17 NOTE — TELEPHONE ENCOUNTER
Pt called requesting the results of her recent labs. C/o extreme nausea and very fatigue and weak today.

## 2017-08-30 ENCOUNTER — OFFICE VISIT (OUTPATIENT)
Dept: FAMILY MEDICINE CLINIC | Facility: CLINIC | Age: 62
End: 2017-08-30

## 2017-08-30 VITALS
WEIGHT: 140.6 LBS | HEART RATE: 85 BPM | BODY MASS INDEX: 32.54 KG/M2 | SYSTOLIC BLOOD PRESSURE: 126 MMHG | DIASTOLIC BLOOD PRESSURE: 78 MMHG | HEIGHT: 55 IN | RESPIRATION RATE: 14 BRPM | OXYGEN SATURATION: 99 %

## 2017-08-30 DIAGNOSIS — J30.2 SEASONAL ALLERGIC RHINITIS, UNSPECIFIED ALLERGIC RHINITIS TRIGGER: Primary | ICD-10-CM

## 2017-08-30 DIAGNOSIS — R10.13 DYSPEPSIA: ICD-10-CM

## 2017-08-30 DIAGNOSIS — F41.9 ANXIETY: ICD-10-CM

## 2017-08-30 PROCEDURE — 99214 OFFICE O/P EST MOD 30 MIN: CPT | Performed by: PHYSICIAN ASSISTANT

## 2017-08-30 RX ORDER — ALBUTEROL SULFATE 90 UG/1
2 AEROSOL, METERED RESPIRATORY (INHALATION) EVERY 4 HOURS PRN
Qty: 3.7 G | Refills: 6 | Status: SHIPPED | OUTPATIENT
Start: 2017-08-30 | End: 2019-07-16 | Stop reason: SDUPTHER

## 2017-08-30 NOTE — PROGRESS NOTES
Subjective   Karen Rubio is a 61 y.o. female    History of Present Illness  Patient is 61-year-old white female comes in complaining of wheezing in chest watery itchy eyes nasal congestion, mild sore throat patient needs refill albuterol inhaler which helps for wheezing and chest.  Symptoms been present for 2 weeks.    Patient complains of chronic anxiety needs refill Xanax 1 mg 4 times a day patient states she has no SI/HI symptoms are controlled with medication no breakthrough depression symptoms the last month she states she is doing better but anxiety is controlled with Xanax.    Patient having dyspepsia upset stomach bloating.  After eating cheese Protonix is not working she has mild dyspepsia no blood in stool no vomiting.  Symptoms have been worse the last 2 weeks to get acid taste in mouth on occasion.      The following portions of the patient's history were reviewed and updated as appropriate: allergies, current medications, past social history and problem list    Review of Systems   Constitutional: Negative for appetite change, diaphoresis, fatigue and unexpected weight change.   Eyes: Negative for visual disturbance.   Respiratory: Negative for cough, chest tightness and shortness of breath.    Cardiovascular: Negative for chest pain, palpitations and leg swelling.   Gastrointestinal: Positive for nausea. Negative for diarrhea and vomiting.        Dyspepsia   Endocrine: Negative for polydipsia, polyphagia and polyuria.   Skin: Negative for color change and rash.   Neurological: Negative for dizziness, syncope, weakness, light-headedness, numbness and headaches.       Objective     Vitals:    08/30/17 1031   BP: 126/78   Pulse: 85   Resp: 14   SpO2: 99%       Physical Exam   Constitutional: She appears well-developed and well-nourished.   Neck: Neck supple. No JVD present. No thyromegaly present.   Cardiovascular: Normal rate, regular rhythm, normal heart sounds, intact distal pulses and normal  pulses.    No murmur heard.  Pulmonary/Chest: Effort normal and breath sounds normal. No respiratory distress.   Abdominal: Soft. Bowel sounds are normal. There is no hepatosplenomegaly. There is no tenderness.   Musculoskeletal: She exhibits no edema.   Lymphadenopathy:     She has no cervical adenopathy.   Neurological: No sensory deficit.   Skin: Skin is warm and dry. She is not diaphoretic.   Nursing note and vitals reviewed.      Assessment/Plan     Diagnoses and all orders for this visit:    Seasonal allergic rhinitis, unspecified allergic rhinitis trigger  -     albuterol (PROVENTIL HFA;VENTOLIN HFA) 108 (90 Base) MCG/ACT inhaler; Inhale 2 puffs Every 4 (Four) Hours As Needed for Wheezing.    Anxiety    Dyspepsia    #1 refill albuterol 2 puffs every 4-6 hours when necessary wheezing    #2 refill Xanax 1 mg 4 times a day dispense 120×5 refills    #3 DC Protonix start Dexilant 60mg qd #30    Patient encouraged to lose weight bland diet no greasy sour foods.

## 2017-09-29 ENCOUNTER — TELEPHONE (OUTPATIENT)
Dept: INTERNAL MEDICINE | Facility: CLINIC | Age: 62
End: 2017-09-29

## 2017-09-29 NOTE — TELEPHONE ENCOUNTER
PT IS WEAK AND LIFELESS WANTED TO KNOW IF WE CAN SEE SOONER THAN HER APPT IN December, SHE HAS HAD CHEMO ALSO    PLEASE CALL [phone]

## 2017-10-04 DIAGNOSIS — E27.1 ADDISON'S DISEASE (HCC): ICD-10-CM

## 2017-10-04 RX ORDER — HYDROCORTISONE 5 MG/1
TABLET ORAL
Qty: 150 TABLET | Refills: 4 | Status: SHIPPED | OUTPATIENT
Start: 2017-10-04 | End: 2017-10-19 | Stop reason: SDUPTHER

## 2017-10-16 ENCOUNTER — TELEPHONE (OUTPATIENT)
Dept: INTERNAL MEDICINE | Facility: CLINIC | Age: 62
End: 2017-10-16

## 2017-10-16 NOTE — TELEPHONE ENCOUNTER
PT SAYS MEDS ARE NOT WORKING. SHE FEELS EXTREMELY WEEK. PHARMACIST SUGGESTED TO SEE IF THE DR CAN UP THE VIT B12 TO ONCE A WEEK. HAS AN APPOINTMENT IN December BUT SHE DOESN'T THINK SHE CAN MAKE IT THAT LONG. SHE'S WEEK, SICK, CAN'T EAT. IS THERE ANY WAY TO SQUEEZE HER IN?

## 2017-10-17 NOTE — TELEPHONE ENCOUNTER
SHE ALREADY HAS APPOINTMENT THS WEEK.  Please let Tita and  people kno sammy if someone cancels in the afternoon dont schedule new people. I need to leave in time or earlirt this week.

## 2017-10-18 NOTE — TELEPHONE ENCOUNTER
Pt returned call and was informed of Dr. Morales's message, pt is able to come in and see Dr. Morales tomorrow at 12:45.

## 2017-10-19 ENCOUNTER — OFFICE VISIT (OUTPATIENT)
Dept: ENDOCRINOLOGY | Facility: CLINIC | Age: 62
End: 2017-10-19

## 2017-10-19 VITALS
HEIGHT: 55 IN | HEART RATE: 90 BPM | SYSTOLIC BLOOD PRESSURE: 128 MMHG | DIASTOLIC BLOOD PRESSURE: 70 MMHG | WEIGHT: 136.2 LBS | BODY MASS INDEX: 31.52 KG/M2 | OXYGEN SATURATION: 100 %

## 2017-10-19 DIAGNOSIS — E78.2 MIXED HYPERLIPIDEMIA: ICD-10-CM

## 2017-10-19 DIAGNOSIS — E55.9 VITAMIN D DEFICIENCY: ICD-10-CM

## 2017-10-19 DIAGNOSIS — IMO0001 UNCONTROLLED TYPE 2 DIABETES MELLITUS WITHOUT COMPLICATION, WITHOUT LONG-TERM CURRENT USE OF INSULIN: ICD-10-CM

## 2017-10-19 DIAGNOSIS — E89.0 POSTABLATIVE HYPOTHYROIDISM: Primary | ICD-10-CM

## 2017-10-19 DIAGNOSIS — E53.8 COBALAMIN DEFICIENCY: ICD-10-CM

## 2017-10-19 DIAGNOSIS — E27.1 ADDISON'S DISEASE (HCC): ICD-10-CM

## 2017-10-19 LAB
25(OH)D3 SERPL-MCNC: 49.5 NG/ML
ALBUMIN SERPL-MCNC: 4.1 G/DL (ref 3.2–4.8)
ALBUMIN/GLOB SERPL: 1.6 G/DL (ref 1.5–2.5)
ALP SERPL-CCNC: 89 U/L (ref 25–100)
ALT SERPL W P-5'-P-CCNC: 20 U/L (ref 7–40)
ANION GAP SERPL CALCULATED.3IONS-SCNC: 3 MMOL/L (ref 3–11)
ARTICHOKE IGE QN: 96 MG/DL (ref 0–130)
AST SERPL-CCNC: 21 U/L (ref 0–33)
BILIRUB SERPL-MCNC: 0.5 MG/DL (ref 0.3–1.2)
BUN BLD-MCNC: 11 MG/DL (ref 9–23)
BUN/CREAT SERPL: 12.2 (ref 7–25)
CALCIUM SPEC-SCNC: 9.7 MG/DL (ref 8.7–10.4)
CHLORIDE SERPL-SCNC: 108 MMOL/L (ref 99–109)
CHOLEST SERPL-MCNC: 159 MG/DL (ref 0–200)
CO2 SERPL-SCNC: 28 MMOL/L (ref 20–31)
CORTIS SERPL-MCNC: 40.6 MCG/DL
CREAT BLD-MCNC: 0.9 MG/DL (ref 0.6–1.3)
GFR SERPL CREATININE-BSD FRML MDRD: 63 ML/MIN/1.73
GLOBULIN UR ELPH-MCNC: 2.5 GM/DL
GLUCOSE BLD-MCNC: 242 MG/DL (ref 70–100)
HBA1C MFR BLD: 7.8 % (ref 4.8–5.6)
HDLC SERPL-MCNC: 51 MG/DL (ref 40–60)
POTASSIUM BLD-SCNC: 4.8 MMOL/L (ref 3.5–5.5)
PROT SERPL-MCNC: 6.6 G/DL (ref 5.7–8.2)
SODIUM BLD-SCNC: 139 MMOL/L (ref 132–146)
T4 FREE SERPL-MCNC: 1.3 NG/DL (ref 0.89–1.76)
TRIGL SERPL-MCNC: 174 MG/DL (ref 0–150)
TSH SERPL DL<=0.05 MIU/L-ACNC: 2.61 MIU/ML (ref 0.35–5.35)

## 2017-10-19 PROCEDURE — 80053 COMPREHEN METABOLIC PANEL: CPT | Performed by: INTERNAL MEDICINE

## 2017-10-19 PROCEDURE — 82024 ASSAY OF ACTH: CPT | Performed by: INTERNAL MEDICINE

## 2017-10-19 PROCEDURE — 80061 LIPID PANEL: CPT | Performed by: INTERNAL MEDICINE

## 2017-10-19 PROCEDURE — 99214 OFFICE O/P EST MOD 30 MIN: CPT | Performed by: INTERNAL MEDICINE

## 2017-10-19 PROCEDURE — 82306 VITAMIN D 25 HYDROXY: CPT | Performed by: INTERNAL MEDICINE

## 2017-10-19 PROCEDURE — 83036 HEMOGLOBIN GLYCOSYLATED A1C: CPT | Performed by: INTERNAL MEDICINE

## 2017-10-19 PROCEDURE — 84439 ASSAY OF FREE THYROXINE: CPT | Performed by: INTERNAL MEDICINE

## 2017-10-19 PROCEDURE — 82533 TOTAL CORTISOL: CPT | Performed by: INTERNAL MEDICINE

## 2017-10-19 PROCEDURE — 84443 ASSAY THYROID STIM HORMONE: CPT | Performed by: INTERNAL MEDICINE

## 2017-10-19 RX ORDER — CYANOCOBALAMIN 1000 UG/ML
1000 INJECTION, SOLUTION INTRAMUSCULAR; SUBCUTANEOUS
Qty: 10 ML | Refills: 3 | Status: SHIPPED | OUTPATIENT
Start: 2017-10-19 | End: 2017-10-19 | Stop reason: SDUPTHER

## 2017-10-19 RX ORDER — MONTELUKAST SODIUM 10 MG/1
10 TABLET ORAL NIGHTLY
Qty: 30 TABLET | Refills: 11 | Status: SHIPPED | OUTPATIENT
Start: 2017-10-19 | End: 2019-05-01 | Stop reason: SDUPTHER

## 2017-10-19 RX ORDER — CYANOCOBALAMIN 1000 UG/ML
1000 INJECTION, SOLUTION INTRAMUSCULAR; SUBCUTANEOUS
Qty: 10 ML | Refills: 3 | Status: SHIPPED | OUTPATIENT
Start: 2017-10-19 | End: 2018-01-11

## 2017-10-19 NOTE — PROGRESS NOTES
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 C/o constant fatigue, Would like to discuss increasing frequency in b12 injections)    Subjective   Karen Rubio is a 61 y.o. female is here today for follow-up.  Follow-up for hypothyroidism and [place]'s disease. HTN and Depression. Diabetes.   Adrenal insufficiency / [place]'s disease dx in 2004. She had significant hyperpigmentation and fatigue.   She was taking hydrocortisone and fludrocortisone. On presentation in Oct 2014 and following visit BP was elevated 180//100 and I have d/c fludrocortisone. BP stabilized.   Last visit she was only taking total of 15 mg hydrocortisone daily and had multiple sx suggestive of adrenal insufficiency, headache hypoglycemia and low BP> I have increase hydrocortisone to 15 mg/10 mg and sx resolved. Now she is concerned about weight gain. Still has fatigue, glucose is higher. BP normal.     Diabetes mellitus type 2   She stopped metformin because of diarrhea. It was well controlled with diet and last visit A1C was 9.2  I have started Januvia 50  mg daily. No hypoglycemia.     Hypothyroidism postablative. S/p I-131 therapy at age 14. She is taking levothyroxine 50 mcg. She stopped thyroid medication last visit and we restarted it.      Feels better after B12 injections, last one was 2 weeks ago and she always feels that sx recur towards the end of the 2nd week. Injections are every 2 weeks now.     C/o fatigue, lack of appetite. Hyperlipidemia is managed with lipitor and she feels that muscle aches are worse. She takes it twice a week now.     C/o extreme fatigue, depression, lethargy, lack of appetite. She feels depressed and has a lack of motivation and lack of energy. Severe hip and back pain prevents her from moving, she is upset that she cannot even walk her dog.          Diabetes         Medications    Current Outpatient Prescriptions:   •  albuterol (PROVENTIL HFA;VENTOLIN HFA) 108 (90 Base) MCG/ACT inhaler,  Inhale 2 puffs Every 4 (Four) Hours As Needed for Wheezing., Disp: 3.7 g, Rfl: 6  •  ALPRAZolam (XANAX) 1 MG tablet, Take 1 mg by mouth 4 (Four) Times a Day., Disp: , Rfl:   •  cetirizine (zyrTEC) 10 MG tablet, TAKE 1 TABLET BY MOUTH DAILY., Disp: 30 tablet, Rfl: 11  •  cyanocobalamin 1000 MCG/ML injection, 1000 mcg every 2 weeks, for 10 injections, then contninue once a month., Disp: 10 mL, Rfl: 1  •  escitalopram (LEXAPRO) 10 MG tablet, Take 1 tablet by mouth Daily., Disp: 30 tablet, Rfl: 11  •  fluticasone (FLONASE) 50 MCG/ACT nasal spray, 1 spray into each nostril Daily., Disp: 1 each, Rfl: 2  •  gabapentin (NEURONTIN) 300 MG capsule, TAKE 1 CAPSULE BY MOUTH EVERY 8 HOURS, Disp: , Rfl: 1  •  glucose blood (ONE TOUCH ULTRA TEST) test strip, 1 each by Other route As Needed. 1-2 times a day, Disp: , Rfl:   •  hydrocortisone (CORTEF) 5 MG tablet, Take 3 tab in am and 2 tab in pm (Patient taking differently: Take 5 mg by mouth See Admin Instructions. Take 3 tab in am and 2 tab in pm ), Disp: 150 tablet, Rfl: 5  •  ibuprofen (ADVIL,MOTRIN) 600 MG tablet, Take 1 tablet by mouth Every 6 (Six) Hours As Needed for Mild Pain (1-3)., Disp: 90 tablet, Rfl: 5  •  levothyroxine (SYNTHROID, LEVOTHROID) 50 MCG tablet, Take 1 tablet by mouth Daily., Disp: 30 tablet, Rfl: 11  •  lisinopril (PRINIVIL,ZESTRIL) 20 MG tablet, Take 1 tablet by mouth Daily., Disp: 30 tablet, Rfl: 11  •  montelukast (SINGULAIR) 10 MG tablet, Take 1 tablet by mouth Every Night., Disp: 30 tablet, Rfl: 11  •  ondansetron ODT (ZOFRAN-ODT) 4 MG disintegrating tablet, Take  by mouth., Disp: , Rfl:   •  ONETOUCH DELICA LANCETS 33G misc, Checks x 1 / day, Disp: , Rfl:   •  oxyCODONE-acetaminophen (PERCOCET)  MG per tablet, Take 1 tablet by mouth 4 (Four) Times a Day., Disp: , Rfl:   •  pantoprazole (PROTONIX) 40 MG EC tablet, Take 40 mg by mouth Daily., Disp: , Rfl:   •  simvastatin (ZOCOR) 20 MG tablet, Take 1 tablet by mouth Every Night., Disp: 30  tablet, Rfl: 11  •  SITagliptin (JANUVIA) 50 MG tablet, Take 1 tablet by mouth Daily., Disp: 30 tablet, Rfl: 11  •  tolterodine (DETROL) 2 MG tablet, Take 2 mg by mouth., Disp: , Rfl:   •  traMADol (ULTRAM) 50 MG tablet, TAKE 1 TABLET BY MOUTH EVERY 8 HOURS, Disp: , Rfl: 1  •  vitamin D (ERGOCALCIFEROL) 73458 UNITS capsule capsule, TAKE 1 CAPSULE EVERY 7 DAYS, Disp: 4 capsule, Rfl: 11  •  zolpidem (AMBIEN) 5 MG tablet, TK 1 T PO HS, Disp: , Rfl: 1    Current Facility-Administered Medications:   •  cyanocobalamin injection 1,000 mcg, 1,000 mcg, Intramuscular, Q28 Days, Aleah THOMAS MD, 1,000 mcg at 12/16/16 1603  •  cyanocobalamin injection 1,000 mcg, 1,000 mcg, Intramuscular, Q28 Days, Aleah THOMAS MD, 1,000 mcg at 02/24/17 1628    PMH  The following portions of the patient's history were reviewed and updated as appropriate: allergies, current medications, past family history, past medical history, past social history, past surgical history and problem list.    Review of systems  Review of Systems   Constitutional: Positive for appetite change (improved appetite. ) and unexpected weight change (weight gain). Negative for activity change, chills and diaphoresis.   HENT: Negative for congestion, ear pain, facial swelling, hearing loss, trouble swallowing and voice change.    Eyes: Negative.  Negative for redness, itching and visual disturbance.   Cardiovascular: Negative for palpitations and leg swelling.   Gastrointestinal: Negative for abdominal distention, abdominal pain, constipation, nausea and vomiting.   Endocrine:        As listed in HPI   Genitourinary: Negative.    Musculoskeletal: Positive for arthralgias and back pain (radiating to the left leg. ). Negative for joint swelling, myalgias, neck pain and neck stiffness.   Skin: Negative.    Allergic/Immunologic: Negative.    Neurological: Positive for light-headedness and numbness. Negative for syncope.   Hematological: Negative.    Psychiatric/Behavioral:  "Positive for decreased concentration (memory loss) and sleep disturbance.   All other systems reviewed and are negative.      Physical exam  Objective   Blood pressure 128/70, pulse 90, height 52.5\" (133.4 cm), weight 136 lb 3.2 oz (61.8 kg), SpO2 100 %.   Physical Exam   Constitutional: She is oriented to person, place, and time. She appears well-developed and well-nourished.   HENT:   Head: Normocephalic and atraumatic.   Nose: Mucosal edema present. Right sinus exhibits maxillary sinus tenderness. Left sinus exhibits maxillary sinus tenderness.   Mouth/Throat: Mucous membranes are normal. Oropharyngeal exudate present.   Eyes: Conjunctivae are normal.   Neck: No thyromegaly present.   The neck is supple and symmetric   Cardiovascular: Normal rate, regular rhythm and normal heart sounds.    Pulmonary/Chest: Effort normal and breath sounds normal.   Musculoskeletal: She exhibits deformity. She exhibits no edema.   Increase curvature of the spine.    Lymphadenopathy:     She has no cervical adenopathy.   Neurological: She is alert and oriented to person, place, and time.   Skin: Skin is warm and dry. No rash noted.   Psychiatric: She has a normal mood and affect. Thought content normal.   Vitals reviewed.        LABS AND IMAGING  No visits with results within 1 Month(s) from this visit.  Latest known visit with results is:    Lab on 07/11/2017   Component Date Value Ref Range Status   • Glucose 07/11/2017 104* 70 - 100 mg/dL Final   • BUN 07/11/2017 8* 9 - 23 mg/dL Final   • Creatinine 07/11/2017 0.80  0.60 - 1.30 mg/dL Final   • Sodium 07/11/2017 144  132 - 146 mmol/L Final   • Potassium 07/11/2017 5.2  3.5 - 5.5 mmol/L Final   • Chloride 07/11/2017 111* 99 - 109 mmol/L Final   • CO2 07/11/2017 28.0  20.0 - 31.0 mmol/L Final   • Calcium 07/11/2017 9.8  8.7 - 10.4 mg/dL Final   • Total Protein 07/11/2017 6.5  5.7 - 8.2 g/dL Final   • Albumin 07/11/2017 4.00  3.20 - 4.80 g/dL Final   • ALT (SGPT) 07/11/2017 25  7 - " 40 U/L Final   • AST (SGOT) 07/11/2017 31  0 - 33 U/L Final   • Alkaline Phosphatase 07/11/2017 76  25 - 100 U/L Final   • Total Bilirubin 07/11/2017 0.8  0.3 - 1.2 mg/dL Final   • eGFR Non African Amer 07/11/2017 73  >60 mL/min/1.73 Final   • Globulin 07/11/2017 2.5  gm/dL Final   • A/G Ratio 07/11/2017 1.6  1.5 - 2.5 g/dL Final   • BUN/Creatinine Ratio 07/11/2017 10.0  7.0 - 25.0 Final   • Anion Gap 07/11/2017 5.0  3.0 - 11.0 mmol/L Final   • Total Cholesterol 07/11/2017 171  0 - 200 mg/dL Final   • Triglycerides 07/11/2017 134  0 - 150 mg/dL Final   • HDL Cholesterol 07/11/2017 55  40 - 60 mg/dL Final   • LDL Cholesterol  07/11/2017 98  0 - 130 mg/dL Final   • TSH 07/11/2017 1.622  0.350 - 5.350 mIU/mL Final   • Free T4 07/11/2017 0.95  0.89 - 1.76 ng/dL Final   • 25 Hydroxy, Vitamin D 07/11/2017 58.4  ng/ml Final   • Vitamin B-12 07/11/2017 1099* 211 - 911 pg/mL Final           Assessment  Assessment/Plan   1. Postablative hypothyroidism    2. Uncontrolled type 2 diabetes mellitus without complication, without long-term current use of insulin    3. Barren's disease    4. Vitamin D deficiency    5. Cobalamin deficiency    6. Mixed hyperlipidemia      No orders of the defined types were placed in this encounter.    Plan  I will repeat labs today to see if sx are secondary to hypothyroidism. If labs are normal, consider increasing hydrocortisone dose.     -Hydrocortisone dose: 15 mg in am and 5 mg in pm.   -restart Levothyroxine 50 mcg a day.  -meds refilled  -recommended to continue simvastatin 20 mg, the benefits are higher than the risks of the low dose. LDL was 120.     - Januvia 50 mg a day.   - B12 injections are administered every 2 weeks, and she feels that weekly would be a better option, she feels well for a week and then fatigue exacerbates. Change to weekly for 10 injections.   - Glucerna samples given to replace meals when she has nausea.   Cont Vit D  Follow-up in 2-3 months.

## 2017-10-20 LAB — ACTH PLAS-MCNC: 35.1 PG/ML (ref 7.2–63.3)

## 2017-11-09 ENCOUNTER — OFFICE VISIT (OUTPATIENT)
Dept: FAMILY MEDICINE CLINIC | Facility: CLINIC | Age: 62
End: 2017-11-09

## 2017-11-09 ENCOUNTER — APPOINTMENT (OUTPATIENT)
Dept: LAB | Facility: HOSPITAL | Age: 62
End: 2017-11-09

## 2017-11-09 VITALS
BODY MASS INDEX: 31.75 KG/M2 | HEIGHT: 55 IN | SYSTOLIC BLOOD PRESSURE: 122 MMHG | RESPIRATION RATE: 14 BRPM | OXYGEN SATURATION: 97 % | DIASTOLIC BLOOD PRESSURE: 70 MMHG | HEART RATE: 96 BPM | WEIGHT: 137.2 LBS

## 2017-11-09 DIAGNOSIS — R53.83 FATIGUE, UNSPECIFIED TYPE: ICD-10-CM

## 2017-11-09 DIAGNOSIS — K21.9 GASTROESOPHAGEAL REFLUX DISEASE WITHOUT ESOPHAGITIS: Primary | ICD-10-CM

## 2017-11-09 LAB
ESTRADIOL SERPL HS-MCNC: <12 PG/ML
PROGEST SERPL-MCNC: <0.15 NG/ML

## 2017-11-09 PROCEDURE — 99213 OFFICE O/P EST LOW 20 MIN: CPT | Performed by: PHYSICIAN ASSISTANT

## 2017-11-09 PROCEDURE — 36415 COLL VENOUS BLD VENIPUNCTURE: CPT | Performed by: PHYSICIAN ASSISTANT

## 2017-11-09 PROCEDURE — 84144 ASSAY OF PROGESTERONE: CPT | Performed by: PHYSICIAN ASSISTANT

## 2017-11-09 PROCEDURE — 82670 ASSAY OF TOTAL ESTRADIOL: CPT | Performed by: PHYSICIAN ASSISTANT

## 2017-11-09 RX ORDER — PANTOPRAZOLE SODIUM 40 MG/1
40 TABLET, DELAYED RELEASE ORAL DAILY
Qty: 30 TABLET | Refills: 11 | Status: SHIPPED | OUTPATIENT
Start: 2017-11-09 | End: 2018-01-11

## 2017-11-09 NOTE — PROGRESS NOTES
Subjective   Karen Rubio is a 62 y.o. female    History of Present Illness  Patient pleasant 61-year-old white female comes in for follow-up of chronic GERD, she needs refill Protonix she states this meds works well taking medication she has no breakthrough symptoms patient is stable she has no acid taste in mouth no nausea.    Patient comes in complaining of fatigue, this is a new problem she states she is always tired but this is different she's having some hot flashes and thinks it could be hormonal patient feels tired weight gain decreased mood.  Patient states that she's really hernadez or so: Normal denies any chest pain shortness of breath any edema  The following portions of the patient's history were reviewed and updated as appropriate: allergies, current medications, past social history and problem list    Review of Systems   Constitutional: Positive for fatigue. Negative for appetite change, diaphoresis, fever and unexpected weight change.   Eyes: Negative for visual disturbance.   Respiratory: Negative for cough, chest tightness and shortness of breath.    Cardiovascular: Negative for chest pain, palpitations and leg swelling.   Gastrointestinal: Negative for diarrhea, nausea and vomiting.   Endocrine: Negative for polydipsia, polyphagia and polyuria.   Skin: Negative for color change and rash.   Neurological: Negative for dizziness, syncope, weakness, light-headedness, numbness and headaches.       Objective     Vitals:    11/09/17 1550   BP: 122/70   Pulse: 96   Resp: 14   SpO2: 97%       Physical Exam   Constitutional: She appears well-developed and well-nourished.   Neck: Neck supple. No JVD present. No thyromegaly present.   Cardiovascular: Normal rate, regular rhythm, normal heart sounds, intact distal pulses and normal pulses.    No murmur heard.  Pulmonary/Chest: Effort normal and breath sounds normal. No respiratory distress.   Abdominal: Soft. Bowel sounds are normal. There is no  hepatosplenomegaly. There is no tenderness.   Musculoskeletal: She exhibits no edema.   Lymphadenopathy:     She has no cervical adenopathy.   Neurological: No sensory deficit.   Skin: Skin is warm and dry. She is not diaphoretic.   Nursing note and vitals reviewed.      Assessment/Plan     Diagnoses and all orders for this visit:    Gastroesophageal reflux disease without esophagitis  -     pantoprazole (PROTONIX) 40 MG EC tablet; Take 1 tablet by mouth Daily.    Fatigue, unspecified type  -     Estradiol  -     Progesterone    Follow-up as needed or in 3 weeks

## 2017-11-13 ENCOUNTER — TELEPHONE (OUTPATIENT)
Dept: INTERNAL MEDICINE | Facility: CLINIC | Age: 62
End: 2017-11-13

## 2017-11-13 NOTE — TELEPHONE ENCOUNTER
PT IS VERY SICK AND WOULD LIKE  A CALL BACK TODAY.  VERY CONCERNED ABOUT HER LABS. SHE HASN'T HARDLY EATEN IN DAYS.

## 2017-11-14 NOTE — TELEPHONE ENCOUNTER
Spoke with pt and she had some questions regarding her recent hormone lab levels. I transferred the phone for her to speak with  directly

## 2017-11-21 ENCOUNTER — TELEPHONE (OUTPATIENT)
Dept: INTERNAL MEDICINE | Facility: CLINIC | Age: 62
End: 2017-11-21

## 2017-11-29 DIAGNOSIS — E27.1 ADDISON'S DISEASE (HCC): ICD-10-CM

## 2017-11-29 RX ORDER — HYDROCORTISONE 5 MG/1
TABLET ORAL
Qty: 150 TABLET | Refills: 5 | Status: SHIPPED | OUTPATIENT
Start: 2017-11-29 | End: 2018-09-06 | Stop reason: SDUPTHER

## 2017-12-11 ENCOUNTER — TELEPHONE (OUTPATIENT)
Dept: INTERNAL MEDICINE | Facility: CLINIC | Age: 62
End: 2017-12-11

## 2017-12-11 NOTE — TELEPHONE ENCOUNTER
PT NEEDS TO CANCEL HER APPT TOMORROW HER  HAS TO WORK AND SHE IS NOT ABLE TO DRIVE BY HERSELF-SHE IS SO WEAK   WHERE CAN WE WORK IN?

## 2017-12-12 ENCOUNTER — OFFICE VISIT (OUTPATIENT)
Dept: ENDOCRINOLOGY | Facility: CLINIC | Age: 62
End: 2017-12-12

## 2017-12-12 VITALS
OXYGEN SATURATION: 98 % | WEIGHT: 134 LBS | HEIGHT: 55 IN | DIASTOLIC BLOOD PRESSURE: 72 MMHG | SYSTOLIC BLOOD PRESSURE: 104 MMHG | BODY MASS INDEX: 31.01 KG/M2 | HEART RATE: 68 BPM

## 2017-12-12 DIAGNOSIS — E53.8 COBALAMIN DEFICIENCY: ICD-10-CM

## 2017-12-12 DIAGNOSIS — E27.1 ADDISON'S DISEASE (HCC): ICD-10-CM

## 2017-12-12 DIAGNOSIS — R11.0 NAUSEA: ICD-10-CM

## 2017-12-12 DIAGNOSIS — IMO0001 UNCONTROLLED TYPE 2 DIABETES MELLITUS WITHOUT COMPLICATION, WITHOUT LONG-TERM CURRENT USE OF INSULIN: Primary | ICD-10-CM

## 2017-12-12 DIAGNOSIS — E89.0 POSTABLATIVE HYPOTHYROIDISM: ICD-10-CM

## 2017-12-12 DIAGNOSIS — E55.9 VITAMIN D DEFICIENCY: ICD-10-CM

## 2017-12-12 LAB
ALBUMIN SERPL-MCNC: 4.1 G/DL (ref 3.2–4.8)
ALBUMIN/GLOB SERPL: 1.8 G/DL (ref 1.5–2.5)
ALP SERPL-CCNC: 106 U/L (ref 25–100)
ALT SERPL W P-5'-P-CCNC: 18 U/L (ref 7–40)
AMYLASE SERPL-CCNC: 57 U/L (ref 30–118)
ANION GAP SERPL CALCULATED.3IONS-SCNC: 10 MMOL/L (ref 3–11)
AST SERPL-CCNC: 21 U/L (ref 0–33)
BILIRUB SERPL-MCNC: 0.6 MG/DL (ref 0.3–1.2)
BUN BLD-MCNC: 13 MG/DL (ref 9–23)
BUN/CREAT SERPL: 16.3 (ref 7–25)
CALCIUM SPEC-SCNC: 9.4 MG/DL (ref 8.7–10.4)
CHLORIDE SERPL-SCNC: 97 MMOL/L (ref 99–109)
CO2 SERPL-SCNC: 29 MMOL/L (ref 20–31)
CREAT BLD-MCNC: 0.8 MG/DL (ref 0.6–1.3)
GFR SERPL CREATININE-BSD FRML MDRD: 72 ML/MIN/1.73
GLOBULIN UR ELPH-MCNC: 2.3 GM/DL
GLUCOSE BLD-MCNC: 276 MG/DL (ref 70–100)
GLUCOSE BLDC GLUCOMTR-MCNC: 274 MG/DL (ref 70–130)
HBA1C MFR BLD: 9 %
LIPASE SERPL-CCNC: 30 U/L (ref 6–51)
POTASSIUM BLD-SCNC: 5.4 MMOL/L (ref 3.5–5.5)
PROT SERPL-MCNC: 6.4 G/DL (ref 5.7–8.2)
SODIUM BLD-SCNC: 136 MMOL/L (ref 132–146)
TSH SERPL DL<=0.05 MIU/L-ACNC: 1.67 MIU/ML (ref 0.35–5.35)

## 2017-12-12 PROCEDURE — 80053 COMPREHEN METABOLIC PANEL: CPT | Performed by: INTERNAL MEDICINE

## 2017-12-12 PROCEDURE — 99214 OFFICE O/P EST MOD 30 MIN: CPT | Performed by: INTERNAL MEDICINE

## 2017-12-12 PROCEDURE — 83690 ASSAY OF LIPASE: CPT | Performed by: INTERNAL MEDICINE

## 2017-12-12 PROCEDURE — 83036 HEMOGLOBIN GLYCOSYLATED A1C: CPT | Performed by: INTERNAL MEDICINE

## 2017-12-12 PROCEDURE — 82962 GLUCOSE BLOOD TEST: CPT | Performed by: INTERNAL MEDICINE

## 2017-12-12 PROCEDURE — 84443 ASSAY THYROID STIM HORMONE: CPT | Performed by: INTERNAL MEDICINE

## 2017-12-12 PROCEDURE — 82150 ASSAY OF AMYLASE: CPT | Performed by: INTERNAL MEDICINE

## 2017-12-12 RX ORDER — ESOMEPRAZOLE MAGNESIUM 40 MG/1
40 CAPSULE, DELAYED RELEASE ORAL DAILY
Qty: 30 CAPSULE | Refills: 1 | Status: SHIPPED | OUTPATIENT
Start: 2017-12-12 | End: 2018-01-11 | Stop reason: SDUPTHER

## 2017-12-12 RX ORDER — ESOMEPRAZOLE MAGNESIUM 40 MG/1
40 CAPSULE, DELAYED RELEASE ORAL DAILY
Qty: 30 CAPSULE | Refills: 1 | Status: SHIPPED | OUTPATIENT
Start: 2017-12-12 | End: 2017-12-12 | Stop reason: SDUPTHER

## 2017-12-12 NOTE — PROGRESS NOTES
Chief complaint  Diabetes (F/u for type 2 diabetes and shawn's disease. C/o decreased appetite x 1 month) and Shawn's Disease    Subjective   Karen Rubio is a 62 y.o. female is here today for follow-up.  Follow-up for hypothyroidism and [place]'s disease. HTN and Depression. Diabetes.   Adrenal insufficiency / Shawn's disease dx in 2004. She had significant hyperpigmentation and fatigue.   She was taking hydrocortisone and fludrocortisone. On presentation in Oct 2014 and following visit BP was elevated 180//100 and I have d/c fludrocortisone. BP stabilized.   Last visit she was only taking total of 15 mg hydrocortisone daily and had multiple sx suggestive of adrenal insufficiency, headache hypoglycemia and low BP> I have increase hydrocortisone to 15 mg/10 mg and sx resolved. Now she is concerned about weight gain. Still has fatigue, glucose is higher. BP normal.     Diabetes mellitus type 2   She stopped metformin because of diarrhea. It was well controlled with diet and last visit A1C was 9.2  I have started Januvia 50  mg daily. No hypoglycemia.     Hypothyroidism postablative. S/p I-131 therapy at age 14. She is taking levothyroxine 50 mcg. She stopped thyroid medication last visit and we restarted it.      Feels better after B12 injections, has energy.   C/o fatigue, lack of appetite. Hyperlipidemia is managed with lipitor and she feels that muscle aches are worse. She takes it twice a week now.     C/o extreme fatigue, depression, lethargy, lack of appetite. She feels depressed and has a lack of motivation and lack of energy. Severe hip and back pain prevents her from moving, she is upset that she cannot even walk her dog. She feels nauseous after meals, lightheadedness when getting up. No appetite. Started crying during the interview. Appears depressed.          Diabetes         Medications    Current Outpatient Prescriptions:   •  albuterol (PROVENTIL HFA;VENTOLIN HFA) 108 (90 Base) MCG/ACT  inhaler, Inhale 2 puffs Every 4 (Four) Hours As Needed for Wheezing., Disp: 3.7 g, Rfl: 6  •  ALPRAZolam (XANAX) 1 MG tablet, Take 1 mg by mouth 4 (Four) Times a Day., Disp: , Rfl:   •  cetirizine (zyrTEC) 10 MG tablet, TAKE 1 TABLET BY MOUTH DAILY., Disp: 30 tablet, Rfl: 11  •  cyanocobalamin 1000 MCG/ML injection, Inject 1 mL under the skin Every 7 (Seven) Days. 1000 mcg every week for 10 injections, then continue maintenance every 2 weeks., Disp: 10 mL, Rfl: 3  •  escitalopram (LEXAPRO) 10 MG tablet, Take 1 tablet by mouth Daily., Disp: 30 tablet, Rfl: 11  •  esomeprazole (NEXIUM) 40 MG capsule, Take 1 capsule by mouth Daily., Disp: 30 capsule, Rfl: 1  •  gabapentin (NEURONTIN) 300 MG capsule, TAKE 1 CAPSULE BY MOUTH EVERY 8 HOURS, Disp: , Rfl: 1  •  glucose blood (ONE TOUCH ULTRA TEST) test strip, 1 each by Other route As Needed. 1-2 times a day, Disp: , Rfl:   •  hydrocortisone (CORTEF) 5 MG tablet, Take 3 tab in am and 2 tab in pm, Disp: 150 tablet, Rfl: 5  •  ibuprofen (ADVIL,MOTRIN) 600 MG tablet, Take 1 tablet by mouth Every 6 (Six) Hours As Needed for Mild Pain (1-3)., Disp: 90 tablet, Rfl: 5  •  levothyroxine (SYNTHROID, LEVOTHROID) 50 MCG tablet, Take 1 tablet by mouth Daily., Disp: 30 tablet, Rfl: 11  •  lisinopril (PRINIVIL,ZESTRIL) 20 MG tablet, Take 1 tablet by mouth Daily., Disp: 30 tablet, Rfl: 11  •  montelukast (SINGULAIR) 10 MG tablet, Take 1 tablet by mouth Every Night., Disp: 30 tablet, Rfl: 11  •  ondansetron ODT (ZOFRAN-ODT) 4 MG disintegrating tablet, Take  by mouth., Disp: , Rfl:   •  ONETOUCH DELICA LANCETS 33G misc, Checks x 1 / day, Disp: , Rfl:   •  oxyCODONE-acetaminophen (PERCOCET)  MG per tablet, Take 1 tablet by mouth 4 (Four) Times a Day., Disp: , Rfl:   •  pantoprazole (PROTONIX) 40 MG EC tablet, Take 1 tablet by mouth Daily., Disp: 30 tablet, Rfl: 11  •  simvastatin (ZOCOR) 20 MG tablet, Take 1 tablet by mouth Every Night., Disp: 30 tablet, Rfl: 11  •  SITagliptin  "(JANUVIA) 100 MG tablet, Take 1 tablet by mouth Daily., Disp: 30 tablet, Rfl: 11  •  Syringe/Needle, Disp, (LUER LOCK SAFETY SYRINGES) 25G X 5/8\" 3 ML misc, 1 each Every 14 (Fourteen) Days. For B12 injections, Disp: 4 each, Rfl: 11  •  tolterodine (DETROL) 2 MG tablet, Take 2 mg by mouth., Disp: , Rfl:   •  traMADol (ULTRAM) 50 MG tablet, TAKE 1 TABLET BY MOUTH EVERY 8 HOURS, Disp: , Rfl: 1  •  vitamin D (ERGOCALCIFEROL) 33947 UNITS capsule capsule, TAKE 1 CAPSULE EVERY 7 DAYS, Disp: 4 capsule, Rfl: 11  •  zolpidem (AMBIEN) 5 MG tablet, TK 1 T PO HS, Disp: , Rfl: 1    Current Facility-Administered Medications:   •  cyanocobalamin injection 1,000 mcg, 1,000 mcg, Intramuscular, Q28 Days, Aleah THOMAS MD, 1,000 mcg at 12/16/16 1603  •  cyanocobalamin injection 1,000 mcg, 1,000 mcg, Intramuscular, Q28 Days, Aleah THOMAS MD, 1,000 mcg at 02/24/17 1628    PMH  The following portions of the patient's history were reviewed and updated as appropriate: allergies, current medications, past family history, past medical history, past social history, past surgical history and problem list.    Review of systems  Review of Systems   Constitutional: Positive for appetite change (improved appetite. ) and unexpected weight change (weight gain). Negative for activity change, chills and diaphoresis.   HENT: Negative for congestion, ear pain, facial swelling, hearing loss, trouble swallowing and voice change.    Eyes: Negative.  Negative for redness, itching and visual disturbance.   Cardiovascular: Negative for palpitations and leg swelling.   Gastrointestinal: Negative for abdominal distention, abdominal pain, constipation, nausea and vomiting.   Endocrine:        As listed in HPI   Genitourinary: Negative.    Musculoskeletal: Positive for arthralgias and back pain (radiating to the left leg. ). Negative for joint swelling, myalgias, neck pain and neck stiffness.   Skin: Negative.    Allergic/Immunologic: Negative.    Neurological: " "Positive for light-headedness and numbness. Negative for syncope.   Hematological: Negative.    Psychiatric/Behavioral: Positive for decreased concentration (memory loss) and sleep disturbance.   All other systems reviewed and are negative.      Physical exam  Objective   Blood pressure 104/72, pulse 68, height 133.4 cm (52.52\"), weight 60.8 kg (134 lb), SpO2 98 %.   Physical Exam   Constitutional: She is oriented to person, place, and time. She appears well-developed and well-nourished.   HENT:   Head: Normocephalic and atraumatic.   Mouth/Throat: Mucous membranes are normal.   Eyes: Conjunctivae are normal.   Neck: No thyromegaly present.   The neck is supple and symmetric   Cardiovascular: Normal rate, regular rhythm and normal heart sounds.    Pulmonary/Chest: Effort normal and breath sounds normal.   Musculoskeletal: She exhibits deformity. She exhibits no edema.   Increase curvature of the spine.    Lymphadenopathy:     She has no cervical adenopathy.   Neurological: She is alert and oriented to person, place, and time.   Skin: Skin is warm and dry. No rash noted.   Psychiatric: She has a normal mood and affect. Thought content normal.   Vitals reviewed.        LABS AND IMAGING  Office Visit on 12/12/2017   Component Date Value Ref Range Status   • Glucose 12/12/2017 274* 70 - 130 mg/dL Final   • Hemoglobin A1C 12/12/2017 9.0  % Final           Assessment  Assessment/Plan   1. Uncontrolled type 2 diabetes mellitus without complication, without long-term current use of insulin    2. Cobalamin deficiency    3. Vitamin D deficiency    4. [place]'s disease    5. Postablative hypothyroidism    6. Nausea      Orders Placed This Encounter   Procedures   • Comprehensive Metabolic Panel   • Lipase   • Amylase   • TSH   • POC Glucose Fingerstick   • POC Glycosylated Hemoglobin (Hb A1C)     Plan    -Hydrocortisone dose: 20 mg in am and 5 mg in pm. tRial of higher dose didn't improve her sx. Labs also support that her sx " are not related to adrenal insufficiency.   -Levothyroxine 50 mcg a day.  -increase januvia to 100 mg daily, glycemic control has worsened.   -meds refilled  -recommended to continue simvastatin 20 mg, the benefits are higher than the risks of the low dose. LDL was 120.   -advised to see GI doctor, PPI rx sent.    - Glucerna samples given to replace meals when she has nausea.   -   Follow-up in 2-3 months.

## 2017-12-26 ENCOUNTER — TELEPHONE (OUTPATIENT)
Dept: FAMILY MEDICINE CLINIC | Facility: CLINIC | Age: 62
End: 2017-12-26

## 2018-01-02 ENCOUNTER — TRANSCRIBE ORDERS (OUTPATIENT)
Dept: FAMILY MEDICINE CLINIC | Facility: CLINIC | Age: 63
End: 2018-01-02

## 2018-01-02 DIAGNOSIS — G47.30 SLEEP APNEA, UNSPECIFIED TYPE: Primary | ICD-10-CM

## 2018-01-05 ENCOUNTER — TRANSCRIBE ORDERS (OUTPATIENT)
Dept: FAMILY MEDICINE CLINIC | Facility: CLINIC | Age: 63
End: 2018-01-05

## 2018-01-05 ENCOUNTER — TELEPHONE (OUTPATIENT)
Dept: FAMILY MEDICINE CLINIC | Facility: CLINIC | Age: 63
End: 2018-01-05

## 2018-01-05 DIAGNOSIS — M25.532 BILATERAL WRIST PAIN: ICD-10-CM

## 2018-01-05 DIAGNOSIS — M25.531 BILATERAL WRIST PAIN: ICD-10-CM

## 2018-01-05 DIAGNOSIS — M25.552 HIP PAIN, BILATERAL: Primary | ICD-10-CM

## 2018-01-05 DIAGNOSIS — M54.50 LUMBOSACRAL PAIN, CHRONIC: ICD-10-CM

## 2018-01-05 DIAGNOSIS — G89.29 LUMBOSACRAL PAIN, CHRONIC: ICD-10-CM

## 2018-01-05 DIAGNOSIS — M25.551 HIP PAIN, BILATERAL: Primary | ICD-10-CM

## 2018-01-05 NOTE — TELEPHONE ENCOUNTER
----- Message from Princess Jeffrey sent at 1/4/2018  2:16 PM EST -----  Contact: patient   She is needing new orders to continue Physical Therapy for her wrist, back and right hip. Thanks

## 2018-01-11 ENCOUNTER — OFFICE VISIT (OUTPATIENT)
Dept: FAMILY MEDICINE CLINIC | Facility: CLINIC | Age: 63
End: 2018-01-11

## 2018-01-11 VITALS
WEIGHT: 137.4 LBS | SYSTOLIC BLOOD PRESSURE: 122 MMHG | OXYGEN SATURATION: 97 % | DIASTOLIC BLOOD PRESSURE: 70 MMHG | HEART RATE: 87 BPM | TEMPERATURE: 98.4 F | HEIGHT: 55 IN | BODY MASS INDEX: 31.8 KG/M2

## 2018-01-11 DIAGNOSIS — M25.551 PAIN OF RIGHT HIP JOINT: ICD-10-CM

## 2018-01-11 DIAGNOSIS — F41.9 ANXIETY: ICD-10-CM

## 2018-01-11 DIAGNOSIS — K21.9 GASTROESOPHAGEAL REFLUX DISEASE WITHOUT ESOPHAGITIS: Primary | ICD-10-CM

## 2018-01-11 PROCEDURE — 99213 OFFICE O/P EST LOW 20 MIN: CPT | Performed by: PHYSICIAN ASSISTANT

## 2018-01-11 RX ORDER — ESCITALOPRAM OXALATE 10 MG/1
10 TABLET ORAL DAILY
Qty: 30 TABLET | Refills: 11 | Status: SHIPPED | OUTPATIENT
Start: 2018-01-11 | End: 2018-04-16 | Stop reason: SINTOL

## 2018-01-11 RX ORDER — IBUPROFEN 600 MG/1
600 TABLET ORAL EVERY 6 HOURS PRN
Qty: 90 TABLET | Refills: 5 | Status: SHIPPED | OUTPATIENT
Start: 2018-01-11 | End: 2019-11-01 | Stop reason: SDUPTHER

## 2018-01-11 RX ORDER — ESOMEPRAZOLE MAGNESIUM 40 MG/1
40 CAPSULE, DELAYED RELEASE ORAL DAILY
Qty: 30 CAPSULE | Refills: 1 | Status: SHIPPED | OUTPATIENT
Start: 2018-01-11 | End: 2019-01-11

## 2018-01-11 RX ORDER — CETIRIZINE HYDROCHLORIDE 10 MG/1
10 TABLET ORAL DAILY
Qty: 30 TABLET | Refills: 11 | Status: SHIPPED | OUTPATIENT
Start: 2018-01-11 | End: 2019-01-28 | Stop reason: SDUPTHER

## 2018-01-11 NOTE — PROGRESS NOTES
Subjective   Karen Rubio is a 62 y.o. female    History of Present Illness  Patient is 62-year-old white  female comes in follow-up of chronic medical conditions.    Patient is complaining of GERD symptoms needs refill  Next and working well no problems or complaints no shortness breath no chest pain meds working well breakthrough symptoms at night    Patient comes in follow-up of anxiety needs refill Lexapro and Xanax meds working well no SI/HI anxiety is controlled    Patient complains of chronic hip pain right, needs refill of ibuprofen patient's pain is stable she does take gabapentin and Percocet which given to her about pain management.      The following portions of the patient's history were reviewed and updated as appropriate: allergies, current medications, past social history and problem list    Review of Systems   Constitutional: Negative for appetite change, diaphoresis and unexpected weight change.   Eyes: Negative for visual disturbance.   Respiratory: Negative for chest tightness.    Cardiovascular: Negative for leg swelling.   Gastrointestinal: Negative for diarrhea and vomiting.        Heartburn   Endocrine: Negative for polydipsia, polyphagia and polyuria.   Musculoskeletal:        Hip pain   Skin: Negative for color change and rash.   Neurological: Negative for syncope, weakness, light-headedness, numbness and headaches.       Objective     Vitals:    01/11/18 1500   BP: 122/70   Pulse: 87   Temp: 98.4 °F (36.9 °C)   SpO2: 97%       Physical Exam   Constitutional: She appears well-developed and well-nourished.   Neck: Neck supple. No JVD present. No thyromegaly present.   Cardiovascular: Normal rate, regular rhythm, normal heart sounds, intact distal pulses and normal pulses.    No murmur heard.  Pulmonary/Chest: Effort normal and breath sounds normal. No respiratory distress.   Abdominal: Soft. Bowel sounds are normal. There is no hepatosplenomegaly. There is no tenderness.    Musculoskeletal: She exhibits no edema.        Right hip: She exhibits decreased range of motion and tenderness.        Lumbar back: She exhibits decreased range of motion and tenderness.   Lymphadenopathy:     She has no cervical adenopathy.   Neurological: No sensory deficit.   Skin: Skin is warm and dry. She is not diaphoretic.   Nursing note and vitals reviewed.      Assessment/Plan     Diagnoses and all orders for this visit:    Gastroesophageal reflux disease without esophagitis  -     esomeprazole (NEXIUM) 40 MG capsule; Take 1 capsule by mouth Daily.    Anxiety  -     escitalopram (LEXAPRO) 10 MG tablet; Take 1 tablet by mouth Daily.    Pain of right hip joint  -     ibuprofen (ADVIL,MOTRIN) 600 MG tablet; Take 1 tablet by mouth Every 6 (Six) Hours As Needed for Mild Pain .    Other orders  -     cetirizine (zyrTEC) 10 MG tablet; Take 1 tablet by mouth Daily.    Follow-up as needed

## 2018-01-25 ENCOUNTER — TELEPHONE (OUTPATIENT)
Dept: FAMILY MEDICINE CLINIC | Facility: CLINIC | Age: 63
End: 2018-01-25

## 2018-01-25 ENCOUNTER — TRANSCRIBE ORDERS (OUTPATIENT)
Dept: FAMILY MEDICINE CLINIC | Facility: CLINIC | Age: 63
End: 2018-01-25

## 2018-01-25 DIAGNOSIS — M25.531 BILATERAL WRIST PAIN: Primary | ICD-10-CM

## 2018-01-25 DIAGNOSIS — M25.532 BILATERAL WRIST PAIN: Primary | ICD-10-CM

## 2018-02-07 ENCOUNTER — TELEPHONE (OUTPATIENT)
Dept: FAMILY MEDICINE CLINIC | Facility: CLINIC | Age: 63
End: 2018-02-07

## 2018-02-07 RX ORDER — ALPRAZOLAM 1 MG/1
1 TABLET ORAL 4 TIMES DAILY
Qty: 120 TABLET | Refills: 3 | OUTPATIENT
Start: 2018-02-07 | End: 2018-06-14 | Stop reason: SDUPTHER

## 2018-03-07 ENCOUNTER — TELEPHONE (OUTPATIENT)
Dept: FAMILY MEDICINE CLINIC | Facility: CLINIC | Age: 63
End: 2018-03-07

## 2018-03-12 ENCOUNTER — TELEPHONE (OUTPATIENT)
Dept: FAMILY MEDICINE CLINIC | Facility: CLINIC | Age: 63
End: 2018-03-12

## 2018-03-25 RX ORDER — SITAGLIPTIN 50 MG/1
TABLET, FILM COATED ORAL
Qty: 30 TABLET | Refills: 4 | OUTPATIENT
Start: 2018-03-25

## 2018-04-16 ENCOUNTER — OFFICE VISIT (OUTPATIENT)
Dept: FAMILY MEDICINE CLINIC | Facility: CLINIC | Age: 63
End: 2018-04-16

## 2018-04-16 VITALS
HEIGHT: 55 IN | DIASTOLIC BLOOD PRESSURE: 68 MMHG | HEART RATE: 67 BPM | SYSTOLIC BLOOD PRESSURE: 118 MMHG | RESPIRATION RATE: 14 BRPM | OXYGEN SATURATION: 98 % | WEIGHT: 137 LBS | BODY MASS INDEX: 31.7 KG/M2

## 2018-04-16 DIAGNOSIS — F41.9 ANXIETY: Primary | ICD-10-CM

## 2018-04-16 PROCEDURE — 99213 OFFICE O/P EST LOW 20 MIN: CPT | Performed by: PHYSICIAN ASSISTANT

## 2018-04-16 RX ORDER — CYANOCOBALAMIN 1000 UG/ML
INJECTION, SOLUTION INTRAMUSCULAR; SUBCUTANEOUS
Refills: 3 | COMMUNITY
Start: 2018-04-05 | End: 2018-11-13 | Stop reason: SDUPTHER

## 2018-04-16 RX ORDER — PANTOPRAZOLE SODIUM 40 MG/1
40 TABLET, DELAYED RELEASE ORAL DAILY
Refills: 0 | COMMUNITY
Start: 2018-01-23 | End: 2018-04-16 | Stop reason: ALTCHOICE

## 2018-04-16 RX ORDER — CITALOPRAM 20 MG/1
20 TABLET ORAL DAILY
Qty: 30 TABLET | Refills: 11 | Status: SHIPPED | OUTPATIENT
Start: 2018-04-16 | End: 2018-10-08 | Stop reason: SDUPTHER

## 2018-04-16 RX ORDER — DICLOFENAC SODIUM 75 MG/1
75 TABLET, DELAYED RELEASE ORAL 2 TIMES DAILY
Refills: 0 | COMMUNITY
Start: 2018-01-23 | End: 2019-06-28

## 2018-04-16 RX ORDER — SITAGLIPTIN 50 MG/1
50 TABLET, FILM COATED ORAL DAILY
Refills: 11 | COMMUNITY
Start: 2018-02-19 | End: 2018-05-03

## 2018-04-16 NOTE — PROGRESS NOTES
Subjective   Karen Rubio is a 62 y.o. female  Anxiety (RF Xanax 1mg QID. Stopped Lexapro because it caused tremors and wants to start different med. )      History of Present Illness  Patient is a 62-year-old white female comes in complaining of anxiety trouble with mood swings states feels tired no energy, feels weak no energy, large amount of stress states that Lexapro call side effects would like to try something else, has U citalopram in the past having difficulty denies any SI/HI  The following portions of the patient's history were reviewed and updated as appropriate: allergies, current medications, past social history and problem list    Review of Systems   Constitutional: Negative for appetite change, diaphoresis, fatigue and unexpected weight change.   Eyes: Negative for visual disturbance.   Respiratory: Negative for cough, chest tightness and shortness of breath.    Cardiovascular: Negative for chest pain, palpitations and leg swelling.   Gastrointestinal: Negative for diarrhea, nausea and vomiting.   Endocrine: Negative for polydipsia, polyphagia and polyuria.   Skin: Negative for color change and rash.   Neurological: Negative for dizziness, syncope, weakness, light-headedness, numbness and headaches.       Objective     Vitals:    04/16/18 1110   BP: 118/68   Pulse: 67   Resp: 14   SpO2: 98%       Physical Exam   Constitutional: She appears well-developed and well-nourished.   Neck: Neck supple. No JVD present. No thyromegaly present.   Cardiovascular: Normal rate, regular rhythm, normal heart sounds, intact distal pulses and normal pulses.    No murmur heard.  Pulmonary/Chest: Effort normal and breath sounds normal. No respiratory distress.   Abdominal: Soft. Bowel sounds are normal. There is no hepatosplenomegaly. There is no tenderness.   Musculoskeletal: She exhibits no edema.   Lymphadenopathy:     She has no cervical adenopathy.   Neurological: No sensory deficit.   Skin: Skin is warm and  dry. She is not diaphoretic.   Nursing note and vitals reviewed.      Assessment/Plan     Diagnoses and all orders for this visit:    Anxiety  -     citalopram (CELEXA) 20 MG tablet; Take 1 tablet by mouth Daily.    Other orders  -     Discontinue: pantoprazole (PROTONIX) 40 MG EC tablet; Take 40 mg by mouth Daily.  -     diclofenac (VOLTAREN) 75 MG EC tablet; Take 75 mg by mouth 2 (Two) Times a Day.  -     cyanocobalamin 1000 MCG/ML injection; NJECT 1 ML SUBCUTANEOUSLY EVERY 7 DAYS FOR 10 INJECTIONS, THEN CONTINUE MAINTENANCE EVERY 2 WEEKS  -     JANUVIA 50 MG tablet; Take 50 mg by mouth Daily.    Refill Xanax 1 mg 4 times a day dispense 120.  ×3 refills    Follow-up as needed

## 2018-04-20 ENCOUNTER — TELEPHONE (OUTPATIENT)
Dept: FAMILY MEDICINE CLINIC | Facility: CLINIC | Age: 63
End: 2018-04-20

## 2018-04-20 NOTE — TELEPHONE ENCOUNTER
----- Message from Dee Dee Newman sent at 4/20/2018  2:38 PM EDT -----  Contact: PT'S. .  DWAIN: Pt. SEE'S SHANNAN.  PT'S.  CAME TO  WINDOW; STATED HER ECHOCARDIOGRAPH SHOWED NO BLOCKAGE. EVERYTHING LOOKED GOOD.  REPORT IS COMING.    Pt. CAN BE REACHED @ [facility] #.

## 2018-05-03 ENCOUNTER — OFFICE VISIT (OUTPATIENT)
Dept: ENDOCRINOLOGY | Facility: CLINIC | Age: 63
End: 2018-05-03

## 2018-05-03 VITALS
HEART RATE: 69 BPM | OXYGEN SATURATION: 98 % | DIASTOLIC BLOOD PRESSURE: 72 MMHG | SYSTOLIC BLOOD PRESSURE: 144 MMHG | WEIGHT: 136 LBS | BODY MASS INDEX: 31.47 KG/M2 | HEIGHT: 55 IN

## 2018-05-03 DIAGNOSIS — I10 ESSENTIAL HYPERTENSION: ICD-10-CM

## 2018-05-03 DIAGNOSIS — IMO0001 UNCONTROLLED TYPE 2 DIABETES MELLITUS WITHOUT COMPLICATION, WITHOUT LONG-TERM CURRENT USE OF INSULIN: Primary | ICD-10-CM

## 2018-05-03 DIAGNOSIS — E27.1 ADDISON'S DISEASE (HCC): ICD-10-CM

## 2018-05-03 DIAGNOSIS — E78.2 MIXED HYPERLIPIDEMIA: ICD-10-CM

## 2018-05-03 DIAGNOSIS — E89.0 POSTABLATIVE HYPOTHYROIDISM: ICD-10-CM

## 2018-05-03 DIAGNOSIS — E55.9 VITAMIN D DEFICIENCY: ICD-10-CM

## 2018-05-03 LAB
ALBUMIN SERPL-MCNC: 4.2 G/DL (ref 3.2–4.8)
ALBUMIN/GLOB SERPL: 1.9 G/DL (ref 1.5–2.5)
ALP SERPL-CCNC: 98 U/L (ref 25–100)
ALT SERPL W P-5'-P-CCNC: 22 U/L (ref 7–40)
ANION GAP SERPL CALCULATED.3IONS-SCNC: 9 MMOL/L (ref 3–11)
AST SERPL-CCNC: 26 U/L (ref 0–33)
BILIRUB SERPL-MCNC: 0.8 MG/DL (ref 0.3–1.2)
BUN BLD-MCNC: 10 MG/DL (ref 9–23)
BUN/CREAT SERPL: 12.5 (ref 7–25)
CALCIUM SPEC-SCNC: 9.3 MG/DL (ref 8.7–10.4)
CHLORIDE SERPL-SCNC: 100 MMOL/L (ref 99–109)
CO2 SERPL-SCNC: 30 MMOL/L (ref 20–31)
CREAT BLD-MCNC: 0.8 MG/DL (ref 0.6–1.3)
GFR SERPL CREATININE-BSD FRML MDRD: 72 ML/MIN/1.73
GLOBULIN UR ELPH-MCNC: 2.2 GM/DL
GLUCOSE BLD-MCNC: 136 MG/DL (ref 70–100)
HBA1C MFR BLD: 9.2 % (ref 4.8–5.6)
POTASSIUM BLD-SCNC: 5 MMOL/L (ref 3.5–5.5)
PROT SERPL-MCNC: 6.4 G/DL (ref 5.7–8.2)
SODIUM BLD-SCNC: 139 MMOL/L (ref 132–146)
T4 FREE SERPL-MCNC: 1.05 NG/DL (ref 0.89–1.76)
TSH SERPL DL<=0.05 MIU/L-ACNC: 1.34 MIU/ML (ref 0.35–5.35)

## 2018-05-03 PROCEDURE — 99214 OFFICE O/P EST MOD 30 MIN: CPT | Performed by: INTERNAL MEDICINE

## 2018-05-03 PROCEDURE — 84439 ASSAY OF FREE THYROXINE: CPT | Performed by: INTERNAL MEDICINE

## 2018-05-03 PROCEDURE — 84443 ASSAY THYROID STIM HORMONE: CPT | Performed by: INTERNAL MEDICINE

## 2018-05-03 PROCEDURE — 80053 COMPREHEN METABOLIC PANEL: CPT | Performed by: INTERNAL MEDICINE

## 2018-05-03 PROCEDURE — 83036 HEMOGLOBIN GLYCOSYLATED A1C: CPT | Performed by: INTERNAL MEDICINE

## 2018-05-03 RX ORDER — FLUTICASONE PROPIONATE 50 MCG
1 SPRAY, SUSPENSION (ML) NASAL DAILY
Qty: 1 BOTTLE | Refills: 2 | Status: SHIPPED | OUTPATIENT
Start: 2018-05-03 | End: 2019-06-13 | Stop reason: SDUPTHER

## 2018-05-03 RX ORDER — LISINOPRIL 10 MG/1
10 TABLET ORAL DAILY
Qty: 30 TABLET | Refills: 11 | Status: SHIPPED | OUTPATIENT
Start: 2018-05-03 | End: 2019-06-28 | Stop reason: SDUPTHER

## 2018-05-03 NOTE — PROGRESS NOTES
Chief complaint  Hypothyroidism (f/u for type 2 diabetes and thyroid ) and Diabetes    Subjective   Karen Rubio is a 62 y.o. female is here today for follow-up.  Follow-up for hypothyroidism and Naples's disease. HTN and Depression. Diabetes.   Adrenal insufficiency / Naples's disease dx in 2004. She had significant hyperpigmentation and fatigue.   She was taking hydrocortisone and fludrocortisone. On presentation in Oct 2014 and following visit BP was elevated 180//100 and I have d/c fludrocortisone. BP stabilized.   Last visit she was only taking total of 15 mg hydrocortisone daily and had multiple sx suggestive of adrenal insufficiency, headache hypoglycemia and low BP> I have increase hydrocortisone to 15 mg/10 mg and sx resolved. Now she is concerned about weight gain and elevated BP. Still has fatigue, glucose is higher.     Diabetes mellitus type 2   She stopped metformin because of diarrhea. It was well controlled with diet and last visit A1C was 9.2  I have started Januvia 50  mg daily, increased to 100  Mg daily . No hypoglycemia.     Hypothyroidism postablative. S/p I-131 therapy at age 14. She is taking levothyroxine 50 mcg. She stopped thyroid medication last visit and we restarted it.      C/o extreme fatigue, depression, lethargy, lack of appetite. GI evaluation is negative for gastritic or ulcer. She feels depressed and has a lack of motivation and lack of energy. Severe hip and back pain prevents her from moving, she is upset that she cannot even walk her dog. She feels nauseous after meals, lightheadedness when getting up. No appetite.   Cardiac evaluation - negative ECHO, was ordered because of abnormal EKG> BP is elevated today and she c/o headache in the occipital area.      Diabetes   Hypoglycemia symptoms include headaches. Associated symptoms include polyuria.   Hypothyroidism   Associated symptoms include arthralgias, headaches and numbness. Pertinent negatives include no  abdominal pain, chills, congestion, diaphoresis, joint swelling, myalgias, nausea, neck pain or vomiting.       Medications    Current Outpatient Prescriptions:   •  albuterol (PROVENTIL HFA;VENTOLIN HFA) 108 (90 Base) MCG/ACT inhaler, Inhale 2 puffs Every 4 (Four) Hours As Needed for Wheezing., Disp: 3.7 g, Rfl: 6  •  ALPRAZolam (XANAX) 1 MG tablet, Take 1 tablet by mouth 4 (Four) Times a Day., Disp: 120 tablet, Rfl: 3  •  cetirizine (zyrTEC) 10 MG tablet, Take 1 tablet by mouth Daily., Disp: 30 tablet, Rfl: 11  •  citalopram (CELEXA) 20 MG tablet, Take 1 tablet by mouth Daily., Disp: 30 tablet, Rfl: 11  •  cyanocobalamin 1000 MCG/ML injection, NJECT 1 ML SUBCUTANEOUSLY EVERY 7 DAYS FOR 10 INJECTIONS, THEN CONTINUE MAINTENANCE EVERY 2 WEEKS, Disp: , Rfl: 3  •  diclofenac (VOLTAREN) 75 MG EC tablet, Take 75 mg by mouth 2 (Two) Times a Day., Disp: , Rfl: 0  •  esomeprazole (NEXIUM) 40 MG capsule, Take 1 capsule by mouth Daily., Disp: 30 capsule, Rfl: 1  •  fluticasone (FLONASE) 50 MCG/ACT nasal spray, 1 spray into each nostril Daily., Disp: 1 bottle, Rfl: 2  •  gabapentin (NEURONTIN) 300 MG capsule, TAKE 1 CAPSULE BY MOUTH EVERY 8 HOURS, Disp: , Rfl: 1  •  glucose blood (ONE TOUCH ULTRA TEST) test strip, 1 each by Other route As Needed. 1-2 times a day, Disp: , Rfl:   •  hydrocortisone (CORTEF) 5 MG tablet, Take 3 tab in am and 2 tab in pm, Disp: 150 tablet, Rfl: 5  •  ibuprofen (ADVIL,MOTRIN) 600 MG tablet, Take 1 tablet by mouth Every 6 (Six) Hours As Needed for Mild Pain ., Disp: 90 tablet, Rfl: 5  •  levothyroxine (SYNTHROID, LEVOTHROID) 50 MCG tablet, Take 1 tablet by mouth Daily., Disp: 30 tablet, Rfl: 11  •  lisinopril (PRINIVIL,ZESTRIL) 10 MG tablet, Take 1 tablet by mouth Daily., Disp: 30 tablet, Rfl: 11  •  montelukast (SINGULAIR) 10 MG tablet, Take 1 tablet by mouth Every Night., Disp: 30 tablet, Rfl: 11  •  ondansetron ODT (ZOFRAN-ODT) 4 MG disintegrating tablet, Take  by mouth., Disp: , Rfl:   •   "ONETOUCH DELICA LANCETS 33G misc, Checks x 1 / day, Disp: , Rfl:   •  oxyCODONE-acetaminophen (PERCOCET)  MG per tablet, Take 1 tablet by mouth 4 (Four) Times a Day., Disp: , Rfl:   •  simvastatin (ZOCOR) 20 MG tablet, Take 1 tablet by mouth Every Night., Disp: 30 tablet, Rfl: 11  •  SITagliptin (JANUVIA) 100 MG tablet, Take 1 tablet by mouth Daily., Disp: 30 tablet, Rfl: 6  •  Syringe/Needle, Disp, (LUER LOCK SAFETY SYRINGES) 25G X 5/8\" 3 ML misc, 1 each Every 14 (Fourteen) Days. For B12 injections, Disp: 4 each, Rfl: 11  •  tolterodine (DETROL) 2 MG tablet, Take 2 mg by mouth., Disp: , Rfl:   •  traMADol (ULTRAM) 50 MG tablet, TAKE 1 TABLET BY MOUTH EVERY 8 HOURS, Disp: , Rfl: 1  •  vitamin D (ERGOCALCIFEROL) 66932 UNITS capsule capsule, TAKE 1 CAPSULE EVERY 7 DAYS, Disp: 4 capsule, Rfl: 11  •  zolpidem (AMBIEN) 5 MG tablet, TK 1 T PO HS, Disp: , Rfl: 1    Current Facility-Administered Medications:   •  cyanocobalamin injection 1,000 mcg, 1,000 mcg, Intramuscular, Q28 Days, Aleah THOMAS MD, 1,000 mcg at 12/16/16 1603  •  cyanocobalamin injection 1,000 mcg, 1,000 mcg, Intramuscular, Q28 Days, Aleah THOMAS MD, 1,000 mcg at 02/24/17 1628    PMH  The following portions of the patient's history were reviewed and updated as appropriate: allergies, current medications, past family history, past medical history, past social history, past surgical history and problem list.    Review of systems  Review of Systems   Constitutional: Positive for appetite change (improved appetite. ) and unexpected weight change (weight gain). Negative for activity change, chills and diaphoresis.   HENT: Negative for congestion, ear pain, facial swelling, hearing loss, trouble swallowing and voice change.    Eyes: Positive for visual disturbance. Negative for redness and itching.   Cardiovascular: Negative for palpitations and leg swelling.   Gastrointestinal: Negative for abdominal distention, abdominal pain, constipation, nausea " "and vomiting.   Endocrine: Positive for polyuria.        As listed in HPI   Genitourinary: Negative.    Musculoskeletal: Positive for arthralgias and back pain (radiating to the left leg. ). Negative for joint swelling, myalgias, neck pain and neck stiffness.   Skin: Negative.    Allergic/Immunologic: Negative.    Neurological: Positive for light-headedness, numbness and headaches. Negative for syncope.   Hematological: Negative.    Psychiatric/Behavioral: Positive for decreased concentration (memory loss) and sleep disturbance.   All other systems reviewed and are negative.      Physical exam  Objective   Blood pressure 144/72, pulse 69, height 52 cm (20.47\"), weight 61.7 kg (136 lb), SpO2 98 %.   Physical Exam   Constitutional: She is oriented to person, place, and time. She appears well-developed and well-nourished.   HENT:   Head: Normocephalic and atraumatic.   Mouth/Throat: Mucous membranes are normal.   Eyes: Conjunctivae are normal.   Neck: No thyromegaly present.   The neck is supple and symmetric   Cardiovascular: Normal rate, regular rhythm and normal heart sounds.    Pulmonary/Chest: Effort normal and breath sounds normal.   Musculoskeletal: She exhibits deformity. She exhibits no edema.   Increase curvature of the spine.    Lymphadenopathy:     She has no cervical adenopathy.   Neurological: She is alert and oriented to person, place, and time.   Skin: Skin is warm and dry. No rash noted.   Psychiatric: She has a normal mood and affect. Thought content normal.   Vitals reviewed.        LABS AND IMAGING  No visits with results within 1 Month(s) from this visit.   Latest known visit with results is:   Office Visit on 12/12/2017   Component Date Value Ref Range Status   • Glucose 12/12/2017 274* 70 - 130 mg/dL Final   • Hemoglobin A1C 12/12/2017 9.0  % Final   • Glucose 12/12/2017 276* 70 - 100 mg/dL Final   • BUN 12/12/2017 13  9 - 23 mg/dL Final   • Creatinine 12/12/2017 0.80  0.60 - 1.30 mg/dL Final   • " Sodium 12/12/2017 136  132 - 146 mmol/L Final   • Potassium 12/12/2017 5.4  3.5 - 5.5 mmol/L Final   • Chloride 12/12/2017 97* 99 - 109 mmol/L Final   • CO2 12/12/2017 29.0  20.0 - 31.0 mmol/L Final   • Calcium 12/12/2017 9.4  8.7 - 10.4 mg/dL Final   • Total Protein 12/12/2017 6.4  5.7 - 8.2 g/dL Final   • Albumin 12/12/2017 4.10  3.20 - 4.80 g/dL Final   • ALT (SGPT) 12/12/2017 18  7 - 40 U/L Final   • AST (SGOT) 12/12/2017 21  0 - 33 U/L Final   • Alkaline Phosphatase 12/12/2017 106* 25 - 100 U/L Final   • Total Bilirubin 12/12/2017 0.6  0.3 - 1.2 mg/dL Final   • eGFR Non African Amer 12/12/2017 72  >60 mL/min/1.73 Final   • Globulin 12/12/2017 2.3  gm/dL Final   • A/G Ratio 12/12/2017 1.8  1.5 - 2.5 g/dL Final   • BUN/Creatinine Ratio 12/12/2017 16.3  7.0 - 25.0 Final   • Anion Gap 12/12/2017 10.0  3.0 - 11.0 mmol/L Final   • Lipase 12/12/2017 30  6 - 51 U/L Final   • TSH 12/12/2017 1.672  0.350 - 5.350 mIU/mL Final   • Amylase 12/12/2017 57  30 - 118 U/L Final           Assessment  Assessment/Plan   1. Uncontrolled type 2 diabetes mellitus without complication, without long-term current use of insulin    2. Essential hypertension    3. Postablative hypothyroidism    4. Vitamin D deficiency    5. Mixed hyperlipidemia    6. Randolph's disease      Orders Placed This Encounter   Procedures   • Comprehensive Metabolic Panel   • Hemoglobin A1c   • TSH   • T4, Free     Plan    -Hydrocortisone dose: 15 mg in am and 5 mg in pm. trial of higher dose didn't improve her sx. The general approach is to provide lowest dose which controls adrenal insufficiency, considering worsening diabetes and elevated BP  -restart lisinopril 10 mg daily, may take 20 mg if BP elevated  -Levothyroxine 50 mcg a day.  -Januvia increased to 100 mg daily, glycemic control has worsened.   -meds refilled  -recommended to continue simvastatin 20 mg, the benefits are higher than the risks of the low dose. LDL was 120.   -advised to see GI doctor,  evaluation was normal.    Follow-up in 3 months.

## 2018-05-03 NOTE — PATIENT INSTRUCTIONS
Januvia 100 mg    Lisinopril 10 mg daily, you may take 20 mg daily if BP >140/90    Try hydrocortisone 15 mg in the morning and 5 mg in the afternoon. If you develop dizziness, you may increase to 15 mg in the morning and 10 mg in the evening.

## 2018-05-07 ENCOUNTER — TELEPHONE (OUTPATIENT)
Dept: ENDOCRINOLOGY | Facility: CLINIC | Age: 63
End: 2018-05-07

## 2018-05-07 NOTE — TELEPHONE ENCOUNTER
----- Message from Aleah THOMAS MD sent at 5/5/2018  5:39 AM EDT -----  Lab letter created. Please print it out and mail to her

## 2018-06-13 ENCOUNTER — TELEPHONE (OUTPATIENT)
Dept: FAMILY MEDICINE CLINIC | Facility: CLINIC | Age: 63
End: 2018-06-13

## 2018-06-13 DIAGNOSIS — E55.9 VITAMIN D DEFICIENCY: ICD-10-CM

## 2018-06-13 RX ORDER — ERGOCALCIFEROL 1.25 MG/1
CAPSULE ORAL
Qty: 4 CAPSULE | Refills: 3 | Status: SHIPPED | OUTPATIENT
Start: 2018-06-13 | End: 2018-11-13 | Stop reason: SDUPTHER

## 2018-06-13 NOTE — TELEPHONE ENCOUNTER
----- Message from Bharti Davis sent at 6/13/2018  2:03 PM EDT -----  Patient needs a refill on her ALPRAZolam (XANAX) 1 MG tablet (take one up to four times a day) [facility] [place] [id number] [phone] [phone]..  ThanksAries.

## 2018-06-13 NOTE — TELEPHONE ENCOUNTER
Patient was given a prescription April 16 with five refills. She has been notified and is going to call her pharmacy.

## 2018-06-14 RX ORDER — ALPRAZOLAM 1 MG/1
1 TABLET ORAL 4 TIMES DAILY
Qty: 120 TABLET | Refills: 5 | OUTPATIENT
Start: 2018-06-14 | End: 2018-12-10 | Stop reason: SDUPTHER

## 2018-06-14 NOTE — TELEPHONE ENCOUNTER
I talked to the pharmacist and she doesn't have an refills remaining. She was given a paper script here in April but has lost it. TIFFANY done and appropriate. I called in a new prescription reflecting the lost prescription per Javan.

## 2018-06-14 NOTE — TELEPHONE ENCOUNTER
----- Message from Bharti Davis sent at 6/14/2018  2:38 PM EDT -----  Patient's  called and said that Karen needs you to call her pharmacy [facility]. - [place] [address] [address] - [phone] [facility] [phone] to ask about her refills on her ALPRAZolam (XANAX) 1 MG tablet (take one up to four times a day). She is supposed to have five refills but the pharmacy stated she did not have any refills left..  Thanks,  Bharti..

## 2018-07-02 DIAGNOSIS — E89.0 POSTABLATIVE HYPOTHYROIDISM: ICD-10-CM

## 2018-07-03 RX ORDER — LEVOTHYROXINE SODIUM 0.05 MG/1
TABLET ORAL
Qty: 30 TABLET | Refills: 2 | Status: SHIPPED | OUTPATIENT
Start: 2018-07-03 | End: 2018-09-06 | Stop reason: SDUPTHER

## 2018-07-22 RX ORDER — SIMVASTATIN 20 MG
20 TABLET ORAL NIGHTLY
Qty: 30 TABLET | Refills: 9 | Status: SHIPPED | OUTPATIENT
Start: 2018-07-22 | End: 2019-06-28 | Stop reason: SDUPTHER

## 2018-07-23 ENCOUNTER — OFFICE VISIT (OUTPATIENT)
Dept: FAMILY MEDICINE CLINIC | Facility: CLINIC | Age: 63
End: 2018-07-23

## 2018-07-23 ENCOUNTER — LAB (OUTPATIENT)
Dept: LAB | Facility: HOSPITAL | Age: 63
End: 2018-07-23

## 2018-07-23 VITALS
SYSTOLIC BLOOD PRESSURE: 118 MMHG | HEART RATE: 62 BPM | BODY MASS INDEX: 31.47 KG/M2 | HEIGHT: 55 IN | OXYGEN SATURATION: 98 % | DIASTOLIC BLOOD PRESSURE: 68 MMHG | WEIGHT: 136 LBS

## 2018-07-23 DIAGNOSIS — IMO0001 UNCONTROLLED TYPE 2 DIABETES MELLITUS WITHOUT COMPLICATION, WITHOUT LONG-TERM CURRENT USE OF INSULIN: ICD-10-CM

## 2018-07-23 DIAGNOSIS — IMO0001 UNCONTROLLED TYPE 2 DIABETES MELLITUS WITHOUT COMPLICATION, WITHOUT LONG-TERM CURRENT USE OF INSULIN: Primary | ICD-10-CM

## 2018-07-23 DIAGNOSIS — R41.3 MEMORY LOSS: ICD-10-CM

## 2018-07-23 LAB
ALBUMIN SERPL-MCNC: 4.2 G/DL (ref 3.2–4.8)
ALBUMIN/GLOB SERPL: 1.8 G/DL (ref 1.5–2.5)
ALP SERPL-CCNC: 77 U/L (ref 25–100)
ALT SERPL W P-5'-P-CCNC: 20 U/L (ref 7–40)
ANION GAP SERPL CALCULATED.3IONS-SCNC: 8 MMOL/L (ref 3–11)
AST SERPL-CCNC: 25 U/L (ref 0–33)
BILIRUB SERPL-MCNC: 0.5 MG/DL (ref 0.3–1.2)
BUN BLD-MCNC: 8 MG/DL (ref 9–23)
BUN/CREAT SERPL: 8.2 (ref 7–25)
CALCIUM SPEC-SCNC: 9.4 MG/DL (ref 8.7–10.4)
CHLORIDE SERPL-SCNC: 103 MMOL/L (ref 99–109)
CO2 SERPL-SCNC: 27 MMOL/L (ref 20–31)
CREAT BLD-MCNC: 0.98 MG/DL (ref 0.6–1.3)
GFR SERPL CREATININE-BSD FRML MDRD: 57 ML/MIN/1.73
GLOBULIN UR ELPH-MCNC: 2.4 GM/DL
GLUCOSE BLD-MCNC: 137 MG/DL (ref 70–100)
HBA1C MFR BLD: 6.5 % (ref 4.8–5.6)
POTASSIUM BLD-SCNC: 5.9 MMOL/L (ref 3.5–5.5)
PROT SERPL-MCNC: 6.6 G/DL (ref 5.7–8.2)
SODIUM BLD-SCNC: 138 MMOL/L (ref 132–146)

## 2018-07-23 PROCEDURE — 36415 COLL VENOUS BLD VENIPUNCTURE: CPT

## 2018-07-23 PROCEDURE — 83036 HEMOGLOBIN GLYCOSYLATED A1C: CPT

## 2018-07-23 PROCEDURE — 82043 UR ALBUMIN QUANTITATIVE: CPT

## 2018-07-23 PROCEDURE — 99214 OFFICE O/P EST MOD 30 MIN: CPT | Performed by: PHYSICIAN ASSISTANT

## 2018-07-23 PROCEDURE — 80053 COMPREHEN METABOLIC PANEL: CPT

## 2018-07-23 RX ORDER — PROMETHAZINE HYDROCHLORIDE 25 MG/1
25 TABLET ORAL EVERY 6 HOURS PRN
Qty: 20 TABLET | Refills: 1 | Status: SHIPPED | OUTPATIENT
Start: 2018-07-23 | End: 2019-09-03

## 2018-07-23 NOTE — PROGRESS NOTES
"Subjective   Karen Rubio is a 62 y.o. female  Fatigue (Pt feels tired and does not feel like doing anything for 2 months. Does not have an appetite.Pt. has fallen asleep three times during visit today.) and Nausea (Has been going on x 1 week. When pt gets nausea, she says she stops eating.)      History of Present Illness  Patient 59-year-old white female who comes in complaining of fatigue no energy, patient's last A1c was 9.2 in May patient states she feels feet fatigue tired nauseated she has been trying to watch her diet and exercise diabetes he's been uncontrolled she states she's been under a lot of stress.  Comes in for follow-up blood work denies any peripheral neuropathy    Patient complains of increasing memory loss over the last year, patient is with her son he states that she sometimes forgets worse she's going while driving, she states she forgets for a \"basic stuff\" such as addresses etc.  Patient will like to see neurology  The following portions of the patient's history were reviewed and updated as appropriate: allergies, current medications, past social history and problem list    Review of Systems   Constitutional: Negative for appetite change, diaphoresis, fatigue and unexpected weight change.   Eyes: Negative for visual disturbance.   Respiratory: Negative for cough, chest tightness and shortness of breath.    Cardiovascular: Negative for chest pain, palpitations and leg swelling.   Gastrointestinal: Negative for diarrhea, nausea and vomiting.   Endocrine: Negative for polydipsia, polyphagia and polyuria.   Skin: Negative for color change and rash.   Neurological: Negative for dizziness, syncope, weakness, light-headedness, numbness and headaches.        Memory loss   Psychiatric/Behavioral: Positive for agitation.       Objective     Vitals:    07/23/18 1544   BP: 118/68   Pulse: 62   SpO2: 98%       Physical Exam   Constitutional: She appears well-developed and well-nourished.   Neck: Neck " supple. No JVD present. No thyromegaly present.   Cardiovascular: Normal rate, regular rhythm, normal heart sounds, intact distal pulses and normal pulses.    No murmur heard.  Pulmonary/Chest: Effort normal and breath sounds normal. No respiratory distress.   Abdominal: Soft. Bowel sounds are normal. There is no hepatosplenomegaly. There is no tenderness.   Musculoskeletal: She exhibits no edema.   Lymphadenopathy:     She has no cervical adenopathy.   Neurological: No sensory deficit.   Skin: Skin is warm and dry. She is not diaphoretic.   Nursing note and vitals reviewed.      Assessment/Plan     Diagnoses and all orders for this visit:    Uncontrolled type 2 diabetes mellitus without complication, without long-term current use of insulin (CMS/[facility])  -     Comprehensive Metabolic Panel; Future  -     Hemoglobin A1c; Future  -     MicroAlbumin, Urine, Random - Urine, Clean Catch; Future    Memory loss  -     Ambulatory Referral to Neurology    Follow-up in 2 weeks

## 2018-07-24 LAB — MICROALBUMIN UR-MCNC: 18.1 UG/ML

## 2018-08-01 ENCOUNTER — TELEPHONE (OUTPATIENT)
Dept: INTERNAL MEDICINE | Facility: CLINIC | Age: 63
End: 2018-08-01

## 2018-08-01 NOTE — TELEPHONE ENCOUNTER
Returned patient call, LMOM advising that there was an opening on 8/7  @12.15, however I have no idea how long that appt will be open.  Should she call back and it already be taken, she can be put on [place] schedule

## 2018-08-01 NOTE — TELEPHONE ENCOUNTER
PATIENT IS CALLING STATING SHE IS VERY SICK. SHE HAS BEEN PASSING OUT AND HAVING ISSUES NOT BEING ABLE TO EAT. SHE HAS HAD STOMACH ISSUES AND IS HAVING ISSUES WALKING. SHE IS CALLING NEEDING AN APPOINTMENT TO BE SEEN FOR HER JÚNIOR DISEASE AND DIABETES ALONG WITH ALL THESE OTHER HEALTH ISSUES GOING ON. YOU CAN REACH PATIENT BACK [phone]

## 2018-08-02 NOTE — TELEPHONE ENCOUNTER
Myriam, Please schedule this patient for August 7 at 12.15.  I am unable to schedule it.  I have spoken with patient and confirmed

## 2018-09-06 ENCOUNTER — OFFICE VISIT (OUTPATIENT)
Dept: ENDOCRINOLOGY | Facility: CLINIC | Age: 63
End: 2018-09-06

## 2018-09-06 VITALS
BODY MASS INDEX: 31.61 KG/M2 | OXYGEN SATURATION: 98 % | HEIGHT: 55 IN | SYSTOLIC BLOOD PRESSURE: 136 MMHG | WEIGHT: 136.6 LBS | DIASTOLIC BLOOD PRESSURE: 60 MMHG | HEART RATE: 86 BPM

## 2018-09-06 DIAGNOSIS — E78.2 MIXED HYPERLIPIDEMIA: ICD-10-CM

## 2018-09-06 DIAGNOSIS — E53.8 COBALAMIN DEFICIENCY: ICD-10-CM

## 2018-09-06 DIAGNOSIS — E55.9 VITAMIN D DEFICIENCY: ICD-10-CM

## 2018-09-06 DIAGNOSIS — E89.0 POSTABLATIVE HYPOTHYROIDISM: ICD-10-CM

## 2018-09-06 DIAGNOSIS — IMO0001 UNCONTROLLED TYPE 2 DIABETES MELLITUS WITHOUT COMPLICATION, WITHOUT LONG-TERM CURRENT USE OF INSULIN: ICD-10-CM

## 2018-09-06 DIAGNOSIS — E27.1 ADDISON'S DISEASE (HCC): Primary | ICD-10-CM

## 2018-09-06 PROCEDURE — 99214 OFFICE O/P EST MOD 30 MIN: CPT | Performed by: INTERNAL MEDICINE

## 2018-09-06 PROCEDURE — 96372 THER/PROPH/DIAG INJ SC/IM: CPT | Performed by: INTERNAL MEDICINE

## 2018-09-06 RX ORDER — HYDROCORTISONE 5 MG/1
TABLET ORAL
Qty: 150 TABLET | Refills: 5 | Status: SHIPPED | OUTPATIENT
Start: 2018-09-06 | End: 2018-10-12 | Stop reason: SDUPTHER

## 2018-09-06 RX ORDER — LEVOTHYROXINE SODIUM 0.05 MG/1
50 TABLET ORAL DAILY
Qty: 30 TABLET | Refills: 11 | Status: SHIPPED | OUTPATIENT
Start: 2018-09-06 | End: 2019-04-03 | Stop reason: SDUPTHER

## 2018-09-06 RX ADMIN — CYANOCOBALAMIN 1000 MCG: 1000 INJECTION, SOLUTION INTRAMUSCULAR; SUBCUTANEOUS at 12:16

## 2018-09-06 NOTE — PROGRESS NOTES
Chief complaint  Diabetes (Follow UP Under a lot of stress ) and Hypothyroidism    Subjective   Karen Rubio is a 62 y.o. female is here today for follow-up.  Follow-up for hypothyroidism and [place]'s disease. HTN and Depression. Diabetes.   Adrenal insufficiency / Shawn's disease dx in 2004. She had significant hyperpigmentation and fatigue.   She was taking hydrocortisone and fludrocortisone. On presentation in Oct 2014 and following visit BP was elevated 180//100 and I have d/c fludrocortisone. BP stabilized.   Last visit she was only taking total of 15 mg hydrocortisone daily and had multiple sx suggestive of adrenal insufficiency, headache hypoglycemia and low BP> I have increased hydrocortisone to 15 mg/10 mg and sx resolved. Now she is concerned about weight gain and elevated BP. Still has fatigue, glucose is higher.     Diabetes mellitus type 2   She stopped metformin because of diarrhea. It was well controlled with diet and last visit A1C was 9.2  I have started Januvia 50  mg daily, increased to 100  Mg daily . No hypoglycemia.     Hypothyroidism postablative. S/p I-131 therapy at age 14. She is taking levothyroxine 50 mcg. She stopped thyroid medication last visit and we restarted it.      C/o extreme fatigue, depression, lethargy, lack of appetite. She feels depressed and has a lack of motivation and lack of energy. Severe hip and back pain prevents her from moving, she is upset that she cannot even walk her dog. She feels nauseous after meals, lightheadedness when getting up. She has episode of passing out spells in August 2018 and her sx improved.   LHC was normal with DR Hopson        Hypothyroidism   Associated symptoms include arthralgias, headaches and numbness. Pertinent negatives include no abdominal pain, chills, congestion, diaphoresis, joint swelling, myalgias, nausea, neck pain or vomiting.   Diabetes   Hypoglycemia symptoms include headaches. Associated symptoms include  polyuria.       Medications    Current Outpatient Prescriptions:   •  albuterol (PROVENTIL HFA;VENTOLIN HFA) 108 (90 Base) MCG/ACT inhaler, Inhale 2 puffs Every 4 (Four) Hours As Needed for Wheezing., Disp: 3.7 g, Rfl: 6  •  ALPRAZolam (XANAX) 1 MG tablet, Take 1 tablet by mouth 4 (Four) Times a Day., Disp: 120 tablet, Rfl: 5  •  cetirizine (zyrTEC) 10 MG tablet, Take 1 tablet by mouth Daily., Disp: 30 tablet, Rfl: 11  •  citalopram (CELEXA) 20 MG tablet, Take 1 tablet by mouth Daily., Disp: 30 tablet, Rfl: 11  •  cyanocobalamin 1000 MCG/ML injection, NJECT 1 ML SUBCUTANEOUSLY EVERY 7 DAYS FOR 10 INJECTIONS, THEN CONTINUE MAINTENANCE EVERY 2 WEEKS, Disp: , Rfl: 3  •  diclofenac (VOLTAREN) 75 MG EC tablet, Take 75 mg by mouth 2 (Two) Times a Day., Disp: , Rfl: 0  •  esomeprazole (NEXIUM) 40 MG capsule, Take 1 capsule by mouth Daily., Disp: 30 capsule, Rfl: 1  •  fluticasone (FLONASE) 50 MCG/ACT nasal spray, 1 spray into each nostril Daily., Disp: 1 bottle, Rfl: 2  •  gabapentin (NEURONTIN) 300 MG capsule, TAKE 1 CAPSULE BY MOUTH EVERY 8 HOURS, Disp: , Rfl: 1  •  glucose blood (ONE TOUCH ULTRA TEST) test strip, 1 each by Other route As Needed. 1-2 times a day, Disp: , Rfl:   •  hydrocortisone (CORTEF) 5 MG tablet, Take 3 tab in am and 2 tab in pm, Disp: 150 tablet, Rfl: 5  •  ibuprofen (ADVIL,MOTRIN) 600 MG tablet, Take 1 tablet by mouth Every 6 (Six) Hours As Needed for Mild Pain ., Disp: 90 tablet, Rfl: 5  •  levothyroxine (SYNTHROID, LEVOTHROID) 50 MCG tablet, TAKE 1 TABLET BY MOUTH ONCE DAILY., Disp: 30 tablet, Rfl: 2  •  lisinopril (PRINIVIL,ZESTRIL) 10 MG tablet, Take 1 tablet by mouth Daily., Disp: 30 tablet, Rfl: 11  •  montelukast (SINGULAIR) 10 MG tablet, Take 1 tablet by mouth Every Night., Disp: 30 tablet, Rfl: 11  •  ondansetron ODT (ZOFRAN-ODT) 4 MG disintegrating tablet, Take  by mouth., Disp: , Rfl:   •  ONETOUCH DELICA LANCETS 33G misc, Checks x 1 / day, Disp: , Rfl:   •  oxyCODONE-acetaminophen  "(PERCOCET)  MG per tablet, Take 1 tablet by mouth 4 (Four) Times a Day., Disp: , Rfl:   •  promethazine (PHENERGAN) 25 MG tablet, Take 1 tablet by mouth Every 6 (Six) Hours As Needed for Nausea or Vomiting., Disp: 20 tablet, Rfl: 1  •  simvastatin (ZOCOR) 20 MG tablet, TAKE 1 TABLET BY MOUTH EVERY NIGHT., Disp: 30 tablet, Rfl: 9  •  SITagliptin (JANUVIA) 100 MG tablet, Take 1 tablet by mouth Daily., Disp: 30 tablet, Rfl: 6  •  Syringe/Needle, Disp, (LUER LOCK SAFETY SYRINGES) 25G X 5/8\" 3 ML misc, 1 each Every 14 (Fourteen) Days. For B12 injections, Disp: 4 each, Rfl: 11  •  tolterodine (DETROL) 2 MG tablet, Take 2 mg by mouth., Disp: , Rfl:   •  vitamin D (ERGOCALCIFEROL) 74988 units capsule capsule, TAKE 1 CAPSULE EVERY 7 DAYS, Disp: 4 capsule, Rfl: 3  •  zolpidem (AMBIEN) 5 MG tablet, TK 1 T PO HS, Disp: , Rfl: 1    Current Facility-Administered Medications:   •  cyanocobalamin injection 1,000 mcg, 1,000 mcg, Intramuscular, Q28 Days, Aleah Morales MD, 1,000 mcg at 12/16/16 1603  •  cyanocobalamin injection 1,000 mcg, 1,000 mcg, Intramuscular, Q28 Days, Aleah Morales MD, 1,000 mcg at 02/24/17 1628    PMH  The following portions of the patient's history were reviewed and updated as appropriate: allergies, current medications, past family history, past medical history, past social history, past surgical history and problem list.    Review of systems  Review of Systems   Constitutional: Positive for appetite change (improved appetite. ) and unexpected weight change (weight gain). Negative for activity change, chills and diaphoresis.   HENT: Negative for congestion, ear pain, facial swelling, hearing loss, trouble swallowing and voice change.    Eyes: Positive for visual disturbance. Negative for redness and itching.   Cardiovascular: Negative for palpitations and leg swelling.   Gastrointestinal: Negative for abdominal distention, abdominal pain, constipation, nausea and vomiting.   Endocrine: Positive for " "polyuria.        As listed in HPI   Genitourinary: Negative.    Musculoskeletal: Positive for arthralgias and back pain (radiating to the left leg. ). Negative for joint swelling, myalgias, neck pain and neck stiffness.   Skin: Negative.    Allergic/Immunologic: Negative.    Neurological: Positive for light-headedness, numbness and headaches. Negative for syncope.   Hematological: Negative.    Psychiatric/Behavioral: Positive for decreased concentration (memory loss) and sleep disturbance.   All other systems reviewed and are negative.      Physical exam  Objective   Blood pressure 136/60, pulse 86, height 52 cm (20.47\"), weight 62 kg (136 lb 9.6 oz), SpO2 98 %.   Physical Exam   Constitutional: She is oriented to person, place, and time. She appears well-developed and well-nourished.   HENT:   Head: Normocephalic and atraumatic.   Mouth/Throat: Mucous membranes are normal.   Eyes: Conjunctivae are normal.   Neck: No JVD present. No thyromegaly present.   The neck is supple and symmetric   Cardiovascular: Normal rate, regular rhythm and normal heart sounds.    Pulmonary/Chest: Effort normal and breath sounds normal.   Musculoskeletal: Normal range of motion. She exhibits deformity (deformity of the feet noted bilaterally). She exhibits no edema.   Increase curvature of the spine.     Karen had a diabetic foot exam performed today.   During the foot exam she had a monofilament test performed.    Neurological Sensory Findings -  Unaltered sharp/dull right ankle/foot discrimination and unaltered sharp/dull left ankle/foot discrimination. No right ankle/foot altered proprioception and no left ankle/foot altered proprioception  Vascular Status -  Her right foot exhibits normal foot vasculature  and no edema. Her left foot exhibits normal foot vasculature  and no edema.  Skin Integrity  -  Her right foot skin is intact.Her left foot skin is intact..  Lymphadenopathy:     She has no cervical adenopathy.   Neurological: " She is alert and oriented to person, place, and time. She has normal reflexes.   Skin: Skin is warm and dry. No rash noted.   Psychiatric: She has a normal mood and affect. Thought content normal.   Vitals reviewed.        LABS AND IMAGING  No visits with results within 1 Month(s) from this visit.   Latest known visit with results is:   Lab on 07/23/2018   Component Date Value Ref Range Status   • Glucose 07/23/2018 137* 70 - 100 mg/dL Final   • BUN 07/23/2018 8* 9 - 23 mg/dL Final   • Creatinine 07/23/2018 0.98  0.60 - 1.30 mg/dL Final   • Sodium 07/23/2018 138  132 - 146 mmol/L Final   • Potassium 07/23/2018 5.9* 3.5 - 5.5 mmol/L Final   • Chloride 07/23/2018 103  99 - 109 mmol/L Final   • CO2 07/23/2018 27.0  20.0 - 31.0 mmol/L Final   • Calcium 07/23/2018 9.4  8.7 - 10.4 mg/dL Final   • Total Protein 07/23/2018 6.6  5.7 - 8.2 g/dL Final   • Albumin 07/23/2018 4.20  3.20 - 4.80 g/dL Final   • ALT (SGPT) 07/23/2018 20  7 - 40 U/L Final   • AST (SGOT) 07/23/2018 25  0 - 33 U/L Final   • Alkaline Phosphatase 07/23/2018 77  25 - 100 U/L Final   • Total Bilirubin 07/23/2018 0.5  0.3 - 1.2 mg/dL Final   • eGFR Non  Amer 07/23/2018 57* >60 mL/min/1.73 Final   • Globulin 07/23/2018 2.4  gm/dL Final   • A/G Ratio 07/23/2018 1.8  1.5 - 2.5 g/dL Final   • BUN/Creatinine Ratio 07/23/2018 8.2  7.0 - 25.0 Final   • Anion Gap 07/23/2018 8.0  3.0 - 11.0 mmol/L Final   • Hemoglobin A1C 07/23/2018 6.50* 4.80 - 5.60 % Final   • Microalbumin, Urine 07/23/2018 18.1  Not Estab. ug/mL Final           Assessment  Assessment/Plan   1. [place]'s disease (CMS/HCC)    2. Uncontrolled type 2 diabetes mellitus without complication, without long-term current use of insulin (CMS/HCC)    3. Mixed hyperlipidemia    4. Postablative hypothyroidism    5. Vitamin D deficiency    6. Cobalamin deficiency      No orders of the defined types were placed in this encounter.    Plan    -Hydrocortisone dose: 15 mg in am and 5 mg in pm.  The general  approach is to provide lowest dose which controls adrenal insufficiency, considering worsening diabetes and elevated BP  -restart lisinopril 10 mg daily, may take 20 mg if BP elevated  -Levothyroxine 50 mcg a day.  -Januvia 100 mg daily, glycemic control has improved.   -meds refilled  -B12 injections were interrupted because of nobody to administer it.   -recommended to continue simvastatin 20 mg, the benefits are higher than the risks of the low dose. LDL was 120.   Foot dr nath  Made  Follow-up in 3 months.

## 2018-09-12 ENCOUNTER — TELEPHONE (OUTPATIENT)
Dept: INTERNAL MEDICINE | Facility: CLINIC | Age: 63
End: 2018-09-12

## 2018-09-12 NOTE — TELEPHONE ENCOUNTER
TWO DAYS  AFTER PATIENT LEFT OFFICE 09/06/2018 SHE STARTED EXPERIENCING EXTREME DIZZINESS AND WEAKNESS. SHE CAN HARDLY WALK TO THE BATHROOM, EAT, AND CAN HARDLY DRINK JUICE.   SHE WON'T BE ABLE TO MAKE HER FOOT DR. APPOINTMENT BECAUSE  SHE'S SCARED TO DRIVE. SHE WOULD LIKE TO SPEAK WITH JORDANA ON THIS MATTER ASA. SHE SAYS SHE'S SCARED TO DEATH AND CAN BARELY OPEN HER EYES. SHE SAYS SHE HAS NEVER FELT LIKE THIS BEFORE.

## 2018-09-12 NOTE — TELEPHONE ENCOUNTER
Please see patient message and advise  I did not know if you changed any of her RX and it referred to that

## 2018-09-13 NOTE — TELEPHONE ENCOUNTER
Please ask her if BP is low or if her dizziness/weakness is feeling lightheaded.   Last visit we talked about adding fludrocortisone 1/2 tablet back, especially if she has dizziness and low BP> If her BP is low, send her rx for fludrocortisone 0.1 mg 1/2 tablet daily.  If her BP is on the high side >130/85, then instead of fludrocortisone, try taking hydrocortisone 1 extra tab;jesse in the afternoon (3 tabs in am and 2 tabs in pm)    She can try taking extra hydrocortisone today and see if she feels better

## 2018-10-02 ENCOUNTER — CLINICAL SUPPORT (OUTPATIENT)
Dept: INTERNAL MEDICINE | Facility: CLINIC | Age: 63
End: 2018-10-02

## 2018-10-02 DIAGNOSIS — E53.8 COBALAMIN DEFICIENCY: ICD-10-CM

## 2018-10-02 PROCEDURE — 96372 THER/PROPH/DIAG INJ SC/IM: CPT | Performed by: INTERNAL MEDICINE

## 2018-10-02 RX ADMIN — CYANOCOBALAMIN 1000 MCG: 1000 INJECTION, SOLUTION INTRAMUSCULAR; SUBCUTANEOUS at 16:04

## 2018-10-05 DIAGNOSIS — E27.1 ADDISON'S DISEASE (HCC): ICD-10-CM

## 2018-10-05 RX ORDER — HYDROCORTISONE 5 MG/1
TABLET ORAL
Qty: 150 TABLET | Refills: 2 | OUTPATIENT
Start: 2018-10-05

## 2018-10-08 ENCOUNTER — OFFICE VISIT (OUTPATIENT)
Dept: FAMILY MEDICINE CLINIC | Facility: CLINIC | Age: 63
End: 2018-10-08

## 2018-10-08 VITALS
RESPIRATION RATE: 16 BRPM | OXYGEN SATURATION: 97 % | HEART RATE: 63 BPM | SYSTOLIC BLOOD PRESSURE: 124 MMHG | HEIGHT: 55 IN | DIASTOLIC BLOOD PRESSURE: 60 MMHG | WEIGHT: 135.6 LBS | BODY MASS INDEX: 31.38 KG/M2

## 2018-10-08 DIAGNOSIS — F41.9 ANXIETY: ICD-10-CM

## 2018-10-08 DIAGNOSIS — M25.562 ACUTE PAIN OF LEFT KNEE: Primary | ICD-10-CM

## 2018-10-08 PROCEDURE — 99213 OFFICE O/P EST LOW 20 MIN: CPT | Performed by: PHYSICIAN ASSISTANT

## 2018-10-08 RX ORDER — TRAMADOL HYDROCHLORIDE 50 MG/1
1 TABLET ORAL EVERY 8 HOURS
Refills: 1 | COMMUNITY
Start: 2018-09-10 | End: 2019-04-17 | Stop reason: ALTCHOICE

## 2018-10-08 RX ORDER — CITALOPRAM 40 MG/1
40 TABLET ORAL DAILY
Qty: 30 TABLET | Refills: 11 | Status: SHIPPED | OUTPATIENT
Start: 2018-10-08 | End: 2019-07-16 | Stop reason: SDUPTHER

## 2018-10-08 NOTE — PROGRESS NOTES
Subjective   Karen Rubio is a 62 y.o. female  Knee Pain (Lt knee radiates to foot. Hx of broken tibia/fibula and occ wears brace.)      History of Present Illness  Patient is a 62-year-old white female comes in complaining of left knee pain patient's knee doesn't lock on occasion she's had x-rays which were negative showed degenerative joint disease patient has swelling and pain immediately describes pain as sharp stabbing 9 out of 10 knee is worse with sitting standing for long periods time    Patient complains of anxiety trouble with mood states that Celexa 20 mg helping think she likes to increase dose she states she's having some breakthrough anxiety but overall much better  The following portions of the patient's history were reviewed and updated as appropriate: allergies, current medications, past social history and problem list    Review of Systems   Constitutional: Negative for appetite change, diaphoresis, fatigue and unexpected weight change.   Eyes: Negative for visual disturbance.   Respiratory: Negative for cough, chest tightness and shortness of breath.    Cardiovascular: Negative for chest pain, palpitations and leg swelling.   Gastrointestinal: Negative for diarrhea, nausea and vomiting.   Endocrine: Negative for polydipsia, polyphagia and polyuria.   Skin: Negative for color change and rash.   Neurological: Negative for dizziness, syncope, weakness, light-headedness, numbness and headaches.       Objective     Vitals:    10/08/18 1426   BP: 124/60   Pulse: 63   Resp: 16   SpO2: 97%       Physical Exam   Constitutional: She appears well-developed and well-nourished.   Neck: Neck supple. No JVD present. No thyromegaly present.   Cardiovascular: Normal rate, regular rhythm, normal heart sounds, intact distal pulses and normal pulses.    No murmur heard.  Pulmonary/Chest: Effort normal and breath sounds normal. No respiratory distress.   Abdominal: Soft. Bowel sounds are normal. There is no  hepatosplenomegaly. There is no tenderness.   Musculoskeletal: She exhibits no edema.        Left knee: Tenderness found. Medial joint line and lateral joint line tenderness noted.        Legs:  Lymphadenopathy:     She has no cervical adenopathy.   Neurological: No sensory deficit.   Skin: Skin is warm and dry. She is not diaphoretic.   Nursing note and vitals reviewed.      Assessment/Plan     Diagnoses and all orders for this visit:    Acute pain of left knee  -     MRI Knee Left Without Contrast    Anxiety  -     citalopram (CeleXA) 40 MG tablet; Take 1 tablet by mouth Daily.    Other orders  -     traMADol (ULTRAM) 50 MG tablet; Take 1 tablet by mouth Every 8 (Eight) Hours.    #1 set up MRI of left knee    #2 increase Celexa to 40 mg 1 pill everyday dispense 30    Follow-up no better

## 2018-10-12 DIAGNOSIS — E27.1 ADDISON'S DISEASE (HCC): ICD-10-CM

## 2018-10-12 RX ORDER — HYDROCORTISONE 5 MG/1
TABLET ORAL
Qty: 120 TABLET | Refills: 1 | Status: SHIPPED | OUTPATIENT
Start: 2018-10-12 | End: 2018-12-22 | Stop reason: SDUPTHER

## 2018-10-26 ENCOUNTER — APPOINTMENT (OUTPATIENT)
Dept: MRI IMAGING | Facility: HOSPITAL | Age: 63
End: 2018-10-26

## 2018-10-28 DIAGNOSIS — E55.9 VITAMIN D DEFICIENCY: ICD-10-CM

## 2018-10-28 RX ORDER — ERGOCALCIFEROL 1.25 MG/1
CAPSULE ORAL
Qty: 4 CAPSULE | Refills: 3 | OUTPATIENT
Start: 2018-10-28

## 2018-11-09 NOTE — TELEPHONE ENCOUNTER
MS PRATT IS REQUESTING A RETURN CALL RE: HER VITAMIN D RX. SHE STATES THAT SHE IS VERY WEAK AT THIS TIME DUE TO CHEMO TREATMENTS AND CAN HARDLY BREATHE.    CALL BACK [phone]

## 2018-11-12 ENCOUNTER — TELEPHONE (OUTPATIENT)
Dept: INTERNAL MEDICINE | Facility: CLINIC | Age: 63
End: 2018-11-12

## 2018-11-12 DIAGNOSIS — E55.9 VITAMIN D DEFICIENCY: ICD-10-CM

## 2018-11-12 NOTE — TELEPHONE ENCOUNTER
Patient called and is requesting a call regarding a call about her vitaman D, she stated the pharmacy didn't understand it.  She said she hasnt ever been off of it.  She said she is weak because she hasnt had it.  She can be reached at [phone]

## 2018-11-12 NOTE — TELEPHONE ENCOUNTER
Pt states that the pharmacy said that you put a on her Vit D, does she require more labs before it can be refilled? SHE states that she is extremely weak without it.

## 2018-11-13 DIAGNOSIS — E55.9 VITAMIN D DEFICIENCY: ICD-10-CM

## 2018-11-13 RX ORDER — ERGOCALCIFEROL 1.25 MG/1
CAPSULE ORAL
Qty: 4 CAPSULE | Refills: 3 | Status: SHIPPED | OUTPATIENT
Start: 2018-11-13 | End: 2018-11-14 | Stop reason: SDUPTHER

## 2018-11-13 RX ORDER — CYANOCOBALAMIN 1000 UG/ML
INJECTION, SOLUTION INTRAMUSCULAR; SUBCUTANEOUS
Qty: 2 ML | Refills: 3 | Status: SHIPPED | OUTPATIENT
Start: 2018-11-13 | End: 2019-03-14 | Stop reason: SDUPTHER

## 2018-11-13 NOTE — TELEPHONE ENCOUNTER
Contacted patient she stated she was NOT having chemo, but just needed refills and it was denied by our office.  Also stated she needed refills on B12.  Rx E- Scripted for both

## 2018-11-14 DIAGNOSIS — E55.9 VITAMIN D DEFICIENCY: ICD-10-CM

## 2018-11-14 RX ORDER — ERGOCALCIFEROL 1.25 MG/1
CAPSULE ORAL
Qty: 4 CAPSULE | Refills: 3 | Status: SHIPPED | OUTPATIENT
Start: 2018-11-14 | End: 2019-06-18 | Stop reason: SDUPTHER

## 2018-12-07 ENCOUNTER — TELEPHONE (OUTPATIENT)
Dept: FAMILY MEDICINE CLINIC | Facility: CLINIC | Age: 63
End: 2018-12-07

## 2018-12-10 RX ORDER — ALPRAZOLAM 1 MG/1
1 TABLET ORAL 4 TIMES DAILY
Qty: 120 TABLET | Refills: 2 | OUTPATIENT
Start: 2018-12-10 | End: 2019-07-16 | Stop reason: SDUPTHER

## 2018-12-10 NOTE — TELEPHONE ENCOUNTER
----- Message from Bharti Davis sent at 12/10/2018 10:22 AM EST -----  Patient is calling again regarding her medication (XANAX) 1 MG tablet (one tablet four times daily),  and wants to know if this can be called in today to   [facility] - [place] [address] - [phone] [facility] [phone]..  ThanksAries.

## 2018-12-22 DIAGNOSIS — E27.1 ADDISON'S DISEASE (HCC): ICD-10-CM

## 2018-12-22 RX ORDER — HYDROCORTISONE 5 MG/1
TABLET ORAL
Qty: 120 TABLET | Refills: 0 | Status: SHIPPED | OUTPATIENT
Start: 2018-12-22 | End: 2019-01-17 | Stop reason: SDUPTHER

## 2019-01-03 ENCOUNTER — TELEPHONE (OUTPATIENT)
Dept: FAMILY MEDICINE CLINIC | Facility: CLINIC | Age: 64
End: 2019-01-03

## 2019-01-03 NOTE — TELEPHONE ENCOUNTER
Karen is established with Dr. Lira office who prescribes her gabapentin 400mg TID #90 and Percocet 10/325 q4hrs #150. She called today because her prescriptions that were written December 21 were in her car when it was recently repossessed. Dr. Lira' office usually gives prescriptions x2 months. I spoke with Lesa at his office and she said that they do not replace prescriptions but it's okay if the patient's PCP writes them. She has a follow up with pain management Feb 28.   Gabapentin last filled 12/7 and Percocet 12/10.

## 2019-01-04 RX ORDER — GABAPENTIN 400 MG/1
400 CAPSULE ORAL 3 TIMES DAILY
Qty: 90 CAPSULE | Refills: 1 | Status: SHIPPED | OUTPATIENT
Start: 2019-01-04 | End: 2019-08-30

## 2019-01-04 RX ORDER — OXYCODONE AND ACETAMINOPHEN 10; 325 MG/1; MG/1
1 TABLET ORAL EVERY 4 HOURS PRN
Qty: 150 TABLET | Refills: 0 | Status: SHIPPED | OUTPATIENT
Start: 2019-01-04

## 2019-01-04 NOTE — TELEPHONE ENCOUNTER
I tried to call patient but voicemail isn't set up. Prescriptions have been put up front in patient . She was given 1RF on gabapentin but must make Percocet last until her pain management appointment at the end of February.

## 2019-01-08 ENCOUNTER — TELEPHONE (OUTPATIENT)
Dept: FAMILY MEDICINE CLINIC | Facility: CLINIC | Age: 64
End: 2019-01-08

## 2019-01-08 NOTE — TELEPHONE ENCOUNTER
I explained to the pharmacist (Uriel) that patient is still seeing pain management but her prescription was in her car when it was repossessed. See previous messages. Ok'd a one day early fill per Javan. This is the only prescription she will be getting from our office.

## 2019-01-08 NOTE — TELEPHONE ENCOUNTER
----- Message from Kenisha Reis sent at 1/8/2019 10:11 AM EST -----  Contact: [facility] [phone] [place]  NEEDS CALL BACK RE: PERCOCET 10/325, PT IS REQUESTING EARLY FILL AND THEY ALSO HAVE QUESTIONS RE: PT IS USUALLY FOLLOWED BY PAIN MGMT . THEY NEED TO KNOW IF HER CARE IS BEING TRANSFERRED TO DR THOMAS FOR PAIN MGMT

## 2019-01-11 ENCOUNTER — OFFICE VISIT (OUTPATIENT)
Dept: FAMILY MEDICINE CLINIC | Facility: CLINIC | Age: 64
End: 2019-01-11

## 2019-01-11 VITALS
SYSTOLIC BLOOD PRESSURE: 122 MMHG | OXYGEN SATURATION: 99 % | HEART RATE: 69 BPM | BODY MASS INDEX: 33.33 KG/M2 | HEIGHT: 55 IN | DIASTOLIC BLOOD PRESSURE: 70 MMHG | WEIGHT: 144 LBS | RESPIRATION RATE: 16 BRPM

## 2019-01-11 DIAGNOSIS — M25.552 LEFT HIP PAIN: Primary | ICD-10-CM

## 2019-01-11 DIAGNOSIS — F41.9 ANXIETY: ICD-10-CM

## 2019-01-11 PROCEDURE — 99214 OFFICE O/P EST MOD 30 MIN: CPT | Performed by: PHYSICIAN ASSISTANT

## 2019-01-11 NOTE — PROGRESS NOTES
Subjective   Karen Rubio is a 63 y.o. female  Groin Pain (Lt side x2 weeks and radiates around leg. Pain started after waking up-no known injury. Treating with topical cream.) and Anxiety (RF alprazolam 1mg QID)      History of Present Illness  Patient is a pleasant 63-year-old white female comes in plan of left groin/hip pain patient states she has large amount of pain she has no relief she states that she's had x-rays which show some degenerative changes she will interesting get an MRI of her hip she cannot stand walk for long periods time due to pain she describes her pain is 9 out of 10 , not stabbing pains worse with sitting standing    Patient comes in for anxiety needs refill of Xanax meds working well concentration is good focus is good will taking Xanax anxiety is controlled patient is currently stable  The following portions of the patient's history were reviewed and updated as appropriate: allergies, current medications, past social history and problem list    Review of Systems   Constitutional: Negative for appetite change, diaphoresis, fatigue and unexpected weight change.   Eyes: Negative for visual disturbance.   Respiratory: Negative for cough, chest tightness and shortness of breath.    Cardiovascular: Negative for chest pain, palpitations and leg swelling.   Gastrointestinal: Negative for diarrhea, nausea and vomiting.   Endocrine: Negative for polydipsia, polyphagia and polyuria.   Musculoskeletal:        Left hip pain chronic   Skin: Negative for color change and rash.   Neurological: Negative for dizziness, syncope, weakness, light-headedness, numbness and headaches.       Objective     Vitals:    01/11/19 1159   BP: 122/70   Pulse: 69   Resp: 16   SpO2: 99%       Physical Exam   Constitutional: She appears well-developed and well-nourished.   Neck: Neck supple. No JVD present. No thyromegaly present.   Cardiovascular: Normal rate, regular rhythm, normal heart sounds, intact distal pulses  and normal pulses.   No murmur heard.  Pulmonary/Chest: Effort normal and breath sounds normal. No respiratory distress.   Abdominal: Soft. Bowel sounds are normal. There is no hepatosplenomegaly. There is no tenderness.   Musculoskeletal: She exhibits no edema.        Left hip: She exhibits decreased range of motion, decreased strength and tenderness.   Lymphadenopathy:     She has no cervical adenopathy.   Neurological: No sensory deficit.   Skin: Skin is warm and dry. She is not diaphoretic.   Nursing note and vitals reviewed.      Assessment/Plan     Diagnoses and all orders for this visit:    Left hip pain  -     MRI Hip Left Without Contrast; Future    Anxiety    Xanax 1 mg 4 times a day dispense 120    Times 5 refills    Follow-up in 2 months

## 2019-01-17 DIAGNOSIS — E27.1 ADDISON'S DISEASE (HCC): ICD-10-CM

## 2019-01-17 RX ORDER — HYDROCORTISONE 5 MG/1
TABLET ORAL
Qty: 120 TABLET | Refills: 0 | Status: SHIPPED | OUTPATIENT
Start: 2019-01-17 | End: 2019-02-17 | Stop reason: SDUPTHER

## 2019-01-23 ENCOUNTER — HOSPITAL ENCOUNTER (OUTPATIENT)
Dept: MRI IMAGING | Facility: HOSPITAL | Age: 64
Discharge: HOME | End: 2019-01-23
Admitting: PHYSICIAN ASSISTANT

## 2019-01-23 DIAGNOSIS — M25.552 LEFT HIP PAIN: ICD-10-CM

## 2019-01-23 PROCEDURE — 73721 MRI JNT OF LWR EXTRE W/O DYE: CPT

## 2019-01-25 DIAGNOSIS — S72.002A CLOSED FRACTURE OF LEFT HIP, INITIAL ENCOUNTER (HCC): Primary | ICD-10-CM

## 2019-01-28 ENCOUNTER — OFFICE VISIT (OUTPATIENT)
Dept: ORTHOPEDIC SURGERY | Facility: CLINIC | Age: 64
End: 2019-01-28

## 2019-01-28 VITALS — HEART RATE: 80 BPM | OXYGEN SATURATION: 98 % | WEIGHT: 138.89 LBS | HEIGHT: 55 IN | BODY MASS INDEX: 32.14 KG/M2

## 2019-01-28 DIAGNOSIS — S32.592A: Primary | ICD-10-CM

## 2019-01-28 PROCEDURE — 99203 OFFICE O/P NEW LOW 30 MIN: CPT | Performed by: ORTHOPAEDIC SURGERY
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
## 2019-01-30 RX ORDER — CETIRIZINE HYDROCHLORIDE 10 MG/1
TABLET ORAL
Qty: 30 TABLET | Refills: 4 | Status: SHIPPED | OUTPATIENT
Start: 2019-01-30 | End: 2019-07-03 | Stop reason: SDUPTHER

## 2019-02-11 DIAGNOSIS — E89.0 POSTABLATIVE HYPOTHYROIDISM: ICD-10-CM

## 2019-02-11 RX ORDER — LEVOTHYROXINE SODIUM 0.05 MG/1
TABLET ORAL
Qty: 30 TABLET | Refills: 0 | Status: SHIPPED | OUTPATIENT
Start: 2019-02-11 | End: 2019-03-20 | Stop reason: SDUPTHER

## 2019-02-11 RX ORDER — SITAGLIPTIN 100 MG/1
TABLET, FILM COATED ORAL
Qty: 30 TABLET | Refills: 0 | Status: SHIPPED | OUTPATIENT
Start: 2019-02-11 | End: 2019-03-20 | Stop reason: SDUPTHER

## 2019-02-17 DIAGNOSIS — E27.1 ADDISON'S DISEASE (HCC): ICD-10-CM

## 2019-02-17 RX ORDER — HYDROCORTISONE 5 MG/1
TABLET ORAL
Qty: 120 TABLET | Refills: 0 | Status: SHIPPED | OUTPATIENT
Start: 2019-02-17 | End: 2019-03-20 | Stop reason: SDUPTHER

## 2019-02-28 ENCOUNTER — LAB (OUTPATIENT)
Dept: LAB | Facility: HOSPITAL | Age: 64
End: 2019-02-28

## 2019-02-28 ENCOUNTER — OFFICE VISIT (OUTPATIENT)
Dept: FAMILY MEDICINE CLINIC | Facility: CLINIC | Age: 64
End: 2019-02-28

## 2019-02-28 VITALS
BODY MASS INDEX: 35.36 KG/M2 | SYSTOLIC BLOOD PRESSURE: 126 MMHG | HEART RATE: 83 BPM | DIASTOLIC BLOOD PRESSURE: 72 MMHG | RESPIRATION RATE: 18 BRPM | WEIGHT: 152.8 LBS | HEIGHT: 55 IN | OXYGEN SATURATION: 98 %

## 2019-02-28 DIAGNOSIS — IMO0001 UNCONTROLLED TYPE 2 DIABETES MELLITUS WITHOUT COMPLICATION, WITHOUT LONG-TERM CURRENT USE OF INSULIN: Primary | ICD-10-CM

## 2019-02-28 DIAGNOSIS — IMO0001 UNCONTROLLED TYPE 2 DIABETES MELLITUS WITHOUT COMPLICATION, WITHOUT LONG-TERM CURRENT USE OF INSULIN: ICD-10-CM

## 2019-02-28 DIAGNOSIS — R53.83 FATIGUE, UNSPECIFIED TYPE: ICD-10-CM

## 2019-02-28 LAB
ALBUMIN SERPL-MCNC: 4.07 G/DL (ref 3.2–4.8)
ALBUMIN/GLOB SERPL: 2.1 G/DL (ref 1.5–2.5)
ALP SERPL-CCNC: 90 U/L (ref 25–100)
ALT SERPL W P-5'-P-CCNC: 22 U/L (ref 7–40)
ANION GAP SERPL CALCULATED.3IONS-SCNC: 8 MMOL/L (ref 3–11)
ARTICHOKE IGE QN: 91 MG/DL (ref 0–130)
AST SERPL-CCNC: 23 U/L (ref 0–33)
BASOPHILS # BLD AUTO: 0.04 10*3/MM3 (ref 0–0.2)
BASOPHILS NFR BLD AUTO: 0.4 % (ref 0–1)
BILIRUB SERPL-MCNC: 0.4 MG/DL (ref 0.3–1.2)
BUN BLD-MCNC: 14 MG/DL (ref 9–23)
BUN/CREAT SERPL: 15.4 (ref 7–25)
CALCIUM SPEC-SCNC: 9.1 MG/DL (ref 8.7–10.4)
CHLORIDE SERPL-SCNC: 103 MMOL/L (ref 99–109)
CHOLEST SERPL-MCNC: 174 MG/DL (ref 0–200)
CO2 SERPL-SCNC: 26 MMOL/L (ref 20–31)
CREAT BLD-MCNC: 0.91 MG/DL (ref 0.6–1.3)
DEPRECATED RDW RBC AUTO: 40.7 FL (ref 37–54)
EOSINOPHIL # BLD AUTO: 0.13 10*3/MM3 (ref 0–0.3)
EOSINOPHIL NFR BLD AUTO: 1.2 % (ref 0–3)
ERYTHROCYTE [DISTWIDTH] IN BLOOD BY AUTOMATED COUNT: 12.6 % (ref 11.3–14.5)
GFR SERPL CREATININE-BSD FRML MDRD: 62 ML/MIN/1.73
GLOBULIN UR ELPH-MCNC: 1.9 GM/DL
GLUCOSE BLD-MCNC: 197 MG/DL (ref 70–100)
HBA1C MFR BLD: 6.8 % (ref 4.8–5.6)
HCT VFR BLD AUTO: 37.9 % (ref 34.5–44)
HDLC SERPL-MCNC: 64 MG/DL (ref 40–60)
HGB BLD-MCNC: 12.3 G/DL (ref 11.5–15.5)
IMM GRANULOCYTES # BLD AUTO: 0.21 10*3/MM3 (ref 0–0.05)
IMM GRANULOCYTES NFR BLD AUTO: 1.9 % (ref 0–0.6)
LYMPHOCYTES # BLD AUTO: 1.59 10*3/MM3 (ref 0.6–4.8)
LYMPHOCYTES NFR BLD AUTO: 14.5 % (ref 24–44)
MCH RBC QN AUTO: 29 PG (ref 27–31)
MCHC RBC AUTO-ENTMCNC: 32.5 G/DL (ref 32–36)
MCV RBC AUTO: 89.4 FL (ref 80–99)
MONOCYTES # BLD AUTO: 0.72 10*3/MM3 (ref 0–1)
MONOCYTES NFR BLD AUTO: 6.6 % (ref 0–12)
NEUTROPHILS # BLD AUTO: 8.47 10*3/MM3 (ref 1.5–8.3)
NEUTROPHILS NFR BLD AUTO: 77.3 % (ref 41–71)
PLATELET # BLD AUTO: 221 10*3/MM3 (ref 150–450)
PMV BLD AUTO: 9.9 FL (ref 6–12)
POTASSIUM BLD-SCNC: 5.6 MMOL/L (ref 3.5–5.5)
PROT SERPL-MCNC: 6 G/DL (ref 5.7–8.2)
RBC # BLD AUTO: 4.24 10*6/MM3 (ref 3.89–5.14)
SODIUM BLD-SCNC: 137 MMOL/L (ref 132–146)
TRIGL SERPL-MCNC: 138 MG/DL (ref 0–150)
VIT B12 BLD-MCNC: 1077 PG/ML (ref 211–911)
WBC NRBC COR # BLD: 10.95 10*3/MM3 (ref 3.5–10.8)

## 2019-02-28 PROCEDURE — 36415 COLL VENOUS BLD VENIPUNCTURE: CPT

## 2019-02-28 PROCEDURE — 99213 OFFICE O/P EST LOW 20 MIN: CPT | Performed by: PHYSICIAN ASSISTANT

## 2019-02-28 PROCEDURE — 82652 VIT D 1 25-DIHYDROXY: CPT

## 2019-02-28 PROCEDURE — 82607 VITAMIN B-12: CPT

## 2019-02-28 PROCEDURE — 85025 COMPLETE CBC W/AUTO DIFF WBC: CPT

## 2019-02-28 PROCEDURE — 80053 COMPREHEN METABOLIC PANEL: CPT

## 2019-02-28 PROCEDURE — 82043 UR ALBUMIN QUANTITATIVE: CPT

## 2019-02-28 PROCEDURE — 83036 HEMOGLOBIN GLYCOSYLATED A1C: CPT

## 2019-02-28 PROCEDURE — 80061 LIPID PANEL: CPT

## 2019-02-28 NOTE — PROGRESS NOTES
Subjective   Karen Rubio is a 63 y.o. female  Fatigue (Occ dizziness)      History of Present Illness  Patient is a pleasant 63-year-old white female who comes in complaining of severe fatigue over the last 3-4 months a fatigue is gotten so bad that she cannot get out of bed some mornings she states she is depressed but that seems to be controlled on her Celexa.  Patient is type II diabetic she is not been watching her diet not been exercising and has not checked her blood sugar on a regular basis.  Denies any blurry vision states she is occasionally dizzy  The following portions of the patient's history were reviewed and updated as appropriate: allergies, current medications, past social history and problem list    Review of Systems   Constitutional: Positive for fatigue. Negative for appetite change, diaphoresis and unexpected weight change.   Eyes: Negative for visual disturbance.   Respiratory: Negative for cough, chest tightness and shortness of breath.    Cardiovascular: Negative for chest pain, palpitations and leg swelling.   Gastrointestinal: Negative for diarrhea, nausea and vomiting.   Endocrine: Negative for polydipsia, polyphagia and polyuria.   Skin: Negative for color change and rash.   Neurological: Negative for dizziness, syncope, weakness, light-headedness, numbness and headaches.   Psychiatric/Behavioral: The patient is nervous/anxious.        Objective     Vitals:    02/28/19 1513   BP: 126/72   Pulse: 83   Resp: 18   SpO2: 98%       Physical Exam   Constitutional: She appears well-developed and well-nourished.   Neck: Neck supple. No JVD present. No thyromegaly present.   Cardiovascular: Normal rate, regular rhythm, normal heart sounds, intact distal pulses and normal pulses.   No murmur heard.  Pulmonary/Chest: Effort normal and breath sounds normal. No respiratory distress.   Abdominal: Soft. Bowel sounds are normal. There is no hepatosplenomegaly. There is no tenderness.    Musculoskeletal: She exhibits no edema.   Lymphadenopathy:     She has no cervical adenopathy.   Neurological: No sensory deficit.   Skin: Skin is warm and dry. She is not diaphoretic.   Nursing note and vitals reviewed.      Assessment/Plan     Diagnoses and all orders for this visit:    Uncontrolled type 2 diabetes mellitus without complication, without long-term current use of insulin (CMS/[facility])  -     Comprehensive metabolic panel; Future  -     Hemoglobin A1c; Future  -     MicroAlbumin, Urine, Random - Urine, Clean Catch; Future  -     Lipid Panel; Future    Fatigue, unspecified type  -     CBC and Differential; Future  -     Comprehensive metabolic panel; Future  -     Vitamin B12; Future  -     Vitamin D 1,25 dihydroxy; Future    #1 check CMP hemoglobin A1c, lipid level, microalbumin urine, lipid level for diabetes    2.  For fatigue check CBC CMP B12 vitamin D

## 2019-03-02 LAB — MICROALBUMIN UR-MCNC: <3 UG/ML

## 2019-03-04 LAB — 1,25(OH)2D3 SERPL-MCNC: 64.9 PG/ML (ref 19.9–79.3)

## 2019-03-11 ENCOUNTER — TELEPHONE (OUTPATIENT)
Dept: ORTHOPEDIC SURGERY | Facility: CLINIC | Age: 64
End: 2019-03-11
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
## 2019-03-14 RX ORDER — CYANOCOBALAMIN 1000 UG/ML
INJECTION, SOLUTION INTRAMUSCULAR; SUBCUTANEOUS
Qty: 2 ML | Refills: 3 | Status: SHIPPED | OUTPATIENT
Start: 2019-03-14 | End: 2020-01-06

## 2019-03-16 DIAGNOSIS — E27.1 ADDISON'S DISEASE (HCC): ICD-10-CM

## 2019-03-16 DIAGNOSIS — E89.0 POSTABLATIVE HYPOTHYROIDISM: ICD-10-CM

## 2019-03-16 RX ORDER — HYDROCORTISONE 5 MG/1
TABLET ORAL
Qty: 120 TABLET | Refills: 0 | OUTPATIENT
Start: 2019-03-16

## 2019-03-16 RX ORDER — LEVOTHYROXINE SODIUM 0.05 MG/1
TABLET ORAL
Qty: 30 TABLET | Refills: 0 | OUTPATIENT
Start: 2019-03-16

## 2019-03-20 ENCOUNTER — OFFICE VISIT (OUTPATIENT)
Dept: ENDOCRINOLOGY | Facility: CLINIC | Age: 64
End: 2019-03-20

## 2019-03-20 VITALS
DIASTOLIC BLOOD PRESSURE: 64 MMHG | WEIGHT: 148.2 LBS | BODY MASS INDEX: 37.77 KG/M2 | OXYGEN SATURATION: 99 % | SYSTOLIC BLOOD PRESSURE: 108 MMHG | HEART RATE: 88 BPM

## 2019-03-20 DIAGNOSIS — E11.9 TYPE 2 DIABETES MELLITUS WITHOUT COMPLICATION, WITHOUT LONG-TERM CURRENT USE OF INSULIN (HCC): Primary | ICD-10-CM

## 2019-03-20 DIAGNOSIS — E27.1 ADDISON'S DISEASE (HCC): ICD-10-CM

## 2019-03-20 DIAGNOSIS — E53.8 COBALAMIN DEFICIENCY: ICD-10-CM

## 2019-03-20 DIAGNOSIS — E89.0 POSTABLATIVE HYPOTHYROIDISM: ICD-10-CM

## 2019-03-20 LAB
GLUCOSE BLDC GLUCOMTR-MCNC: 173 MG/DL (ref 70–130)
T4 FREE SERPL-MCNC: 0.8 NG/DL (ref 0.89–1.76)
TSH SERPL DL<=0.05 MIU/L-ACNC: 1.71 MIU/ML (ref 0.35–5.35)

## 2019-03-20 PROCEDURE — 96372 THER/PROPH/DIAG INJ SC/IM: CPT | Performed by: PHYSICIAN ASSISTANT

## 2019-03-20 PROCEDURE — 84443 ASSAY THYROID STIM HORMONE: CPT | Performed by: PHYSICIAN ASSISTANT

## 2019-03-20 PROCEDURE — 84439 ASSAY OF FREE THYROXINE: CPT | Performed by: PHYSICIAN ASSISTANT

## 2019-03-20 PROCEDURE — 82947 ASSAY GLUCOSE BLOOD QUANT: CPT | Performed by: PHYSICIAN ASSISTANT

## 2019-03-20 PROCEDURE — 99214 OFFICE O/P EST MOD 30 MIN: CPT | Performed by: PHYSICIAN ASSISTANT

## 2019-03-20 RX ORDER — HYDROCORTISONE 5 MG/1
TABLET ORAL
Qty: 120 TABLET | Refills: 5 | Status: SHIPPED | OUTPATIENT
Start: 2019-03-20 | End: 2019-05-09 | Stop reason: SDUPTHER

## 2019-03-20 RX ORDER — CYANOCOBALAMIN 1000 UG/ML
1000 INJECTION, SOLUTION INTRAMUSCULAR; SUBCUTANEOUS
Status: DISCONTINUED | OUTPATIENT
Start: 2019-03-20 | End: 2019-06-28

## 2019-03-20 RX ADMIN — CYANOCOBALAMIN 1000 MCG: 1000 INJECTION, SOLUTION INTRAMUSCULAR; SUBCUTANEOUS at 15:10

## 2019-03-20 NOTE — PROGRESS NOTES
"Chief complaint  DM 2 (Office Visit)    Subjective   Karen Rubio is a 63 y.o. female is here today for follow-up.  Follow-up for hypothyroidism and Chester's disease. HTN and Depression. Diabetes.   Adrenal insufficiency / Shawn's disease dx in 2004. She had significant hyperpigmentation and fatigue.   She was taking hydrocortisone and fludrocortisone. On presentation in Oct 2014 and following visit BP was elevated 180//100 and I have d/c fludrocortisone. BP stabilized.   Last visit she was only taking total of 15 mg hydrocortisone daily and had multiple sx suggestive of adrenal insufficiency, headache hypoglycemia and low BP> I have increased hydrocortisone to 15 mg/10 mg and sx resolved. Now she is concerned about weight gain and elevated BP. Still has fatigue, glucose is higher. Last visit 9/2018- AM 15mg, decreased to 5mg in PM.  BP good today.     Diabetes mellitus type 2   She stopped metformin because of diarrhea. On Januvia 100  Mg daily . No hypoglycemia. A1C 6.8 2/28/19    Hypothyroidism postablative. S/p I-131 therapy at age 14. She is taking levothyroxine 50 mcg.     Last seen 9/2018    Presents with .   Having memory issues.   Has DDD, had spinal steroid injection, now pain is worse. Being sent to another back doctor.   C/o increased wt gain.  Nausea all the time. No difference in how she feels since pm hydrocortisone decreased to 5mg.  Tired all the time, but has trouble sleeping. Not going to bed till 2AM.   Overall not feeling well.   Has SIENA. CPAP \"makes me smother.\"  Depressed about all her health issues. On citalopram.            Diabetes   Hypoglycemia symptoms include headaches. Associated symptoms include polyuria.   Hypothyroidism   Associated symptoms include arthralgias, headaches and numbness. Pertinent negatives include no abdominal pain, chills, congestion, diaphoresis, joint swelling, myalgias, nausea, neck pain or vomiting.       Medications    Current Outpatient " Medications:   •  albuterol (PROVENTIL HFA;VENTOLIN HFA) 108 (90 Base) MCG/ACT inhaler, Inhale 2 puffs Every 4 (Four) Hours As Needed for Wheezing., Disp: 3.7 g, Rfl: 6  •  ALPRAZolam (XANAX) 1 MG tablet, Take 1 tablet by mouth 4 (Four) Times a Day., Disp: 120 tablet, Rfl: 2  •  cetirizine (zyrTEC) 10 MG tablet, TAKE 1 TABLET BY MOUTH EVERY DAY, Disp: 30 tablet, Rfl: 4  •  citalopram (CeleXA) 40 MG tablet, Take 1 tablet by mouth Daily., Disp: 30 tablet, Rfl: 11  •  cyanocobalamin 1000 MCG/ML injection, INJECT 1ML EVERY 2 WEEKS AS DIRECTED, Disp: 2 mL, Rfl: 3  •  diclofenac (VOLTAREN) 75 MG EC tablet, Take 75 mg by mouth 2 (Two) Times a Day., Disp: , Rfl: 0  •  fluticasone (FLONASE) 50 MCG/ACT nasal spray, 1 spray into each nostril Daily., Disp: 1 bottle, Rfl: 2  •  gabapentin (NEURONTIN) 400 MG capsule, Take 1 capsule by mouth 3 (Three) Times a Day., Disp: 90 capsule, Rfl: 1  •  glucose blood (ONE TOUCH ULTRA TEST) test strip, 1 each by Other route As Needed. 1-2 times a day, Disp: , Rfl:   •  hydrocortisone (CORTEF) 5 MG tablet, TAKE 3 TABLETS BY MOUTH EVERY DAY IN THE MORNING AND ALSO TAKE 1 TABLET EVERY EVENING, Disp: 120 tablet, Rfl: 5  •  ibuprofen (ADVIL,MOTRIN) 600 MG tablet, Take 1 tablet by mouth Every 6 (Six) Hours As Needed for Mild Pain ., Disp: 90 tablet, Rfl: 5  •  levothyroxine (SYNTHROID, LEVOTHROID) 50 MCG tablet, Take 1 tablet by mouth Daily., Disp: 30 tablet, Rfl: 11  •  lisinopril (PRINIVIL,ZESTRIL) 10 MG tablet, Take 1 tablet by mouth Daily., Disp: 30 tablet, Rfl: 11  •  montelukast (SINGULAIR) 10 MG tablet, Take 1 tablet by mouth Every Night., Disp: 30 tablet, Rfl: 11  •  ondansetron ODT (ZOFRAN-ODT) 4 MG disintegrating tablet, Take  by mouth., Disp: , Rfl:   •  ONETOUCH DELICA LANCETS 33G misc, Checks x 1 / day, Disp: , Rfl:   •  oxyCODONE-acetaminophen (PERCOCET)  MG per tablet, Take 1 tablet by mouth Every 4 (Four) Hours As Needed for Moderate Pain ., Disp: 150 tablet, Rfl: 0  •   "promethazine (PHENERGAN) 25 MG tablet, Take 1 tablet by mouth Every 6 (Six) Hours As Needed for Nausea or Vomiting., Disp: 20 tablet, Rfl: 1  •  simvastatin (ZOCOR) 20 MG tablet, TAKE 1 TABLET BY MOUTH EVERY NIGHT., Disp: 30 tablet, Rfl: 9  •  SITagliptin (JANUVIA) 100 MG tablet, Take 1 tablet by mouth Daily., Disp: 30 tablet, Rfl: 5  •  Syringe/Needle, Disp, (LUER LOCK SAFETY SYRINGES) 25G X 5/8\" 3 ML misc, 1 each Every 14 (Fourteen) Days. For B12 injections, Disp: 4 each, Rfl: 11  •  tolterodine (DETROL) 2 MG tablet, Take 2 mg by mouth., Disp: , Rfl:   •  traMADol (ULTRAM) 50 MG tablet, Take 1 tablet by mouth Every 8 (Eight) Hours., Disp: , Rfl: 1  •  vitamin D (ERGOCALCIFEROL) 98153 units capsule capsule, Every 7 days, Disp: 4 capsule, Rfl: 3  •  zolpidem (AMBIEN) 5 MG tablet, TK 1 T PO HS, Disp: , Rfl: 1    Current Facility-Administered Medications:   •  cyanocobalamin injection 1,000 mcg, 1,000 mcg, Intramuscular, Q28 Days, Aleah Morales MD, 1,000 mcg at 10/02/18 1604    PMH  The following portions of the patient's history were reviewed and updated as appropriate: allergies, current medications, past family history, past medical history, past social history, past surgical history and problem list.    Review of systems  Review of Systems   Constitutional: Positive for appetite change (improved appetite. ) and unexpected weight change (weight gain). Negative for activity change, chills and diaphoresis.   HENT: Negative for congestion, ear pain, facial swelling, hearing loss, trouble swallowing and voice change.    Eyes: Positive for visual disturbance. Negative for redness and itching.   Cardiovascular: Negative for palpitations and leg swelling.   Gastrointestinal: Negative for abdominal distention, abdominal pain, constipation, nausea and vomiting.   Endocrine: Positive for polyuria.        As listed in HPI   Genitourinary: Negative.    Musculoskeletal: Positive for arthralgias and back pain (radiating to the " left leg. ). Negative for joint swelling, myalgias, neck pain and neck stiffness.   Skin: Negative.    Allergic/Immunologic: Negative.    Neurological: Positive for light-headedness, numbness and headaches. Negative for syncope.   Hematological: Negative.    Psychiatric/Behavioral: Positive for decreased concentration (memory loss) and sleep disturbance.   All other systems reviewed and are negative.      Physical exam  Objective   Blood pressure 108/64, pulse 88, weight 67.2 kg (148 lb 3.2 oz), SpO2 99 %.   Physical Exam   Constitutional: She is oriented to person, place, and time. She appears well-developed and well-nourished.   HENT:   Head: Normocephalic and atraumatic.   Mouth/Throat: Mucous membranes are normal.   Eyes: Conjunctivae are normal.   Neck: No JVD present. No thyromegaly present.   The neck is supple and symmetric   Cardiovascular: Normal rate, regular rhythm and normal heart sounds.   Pulmonary/Chest: Effort normal and breath sounds normal.   Musculoskeletal: Normal range of motion. She exhibits deformity (deformity of the feet noted bilaterally). She exhibits no edema.      Foot Structure and Biomechanics -  Her right foot exhibits hammer toes.  Her left foot exhibits hammer toes.  Lymphadenopathy:     She has no cervical adenopathy.   Neurological: She is alert and oriented to person, place, and time. She has normal reflexes.   Skin: Skin is warm and dry. No rash noted.   Psychiatric: She has a normal mood and affect. Thought content normal.   Vitals reviewed.        LABS AND IMAGING  Office Visit on 03/20/2019   Component Date Value Ref Range Status   • Glucose 03/20/2019 173* 70 - 130 mg/dL Final   Lab on 02/28/2019   Component Date Value Ref Range Status   • Glucose 02/28/2019 197* 70 - 100 mg/dL Final   • BUN 02/28/2019 14  9 - 23 mg/dL Final   • Creatinine 02/28/2019 0.91  0.60 - 1.30 mg/dL Final   • Sodium 02/28/2019 137  132 - 146 mmol/L Final   • Potassium 02/28/2019 5.6* 3.5 - 5.5  mmol/L Final   • Chloride 02/28/2019 103  99 - 109 mmol/L Final   • CO2 02/28/2019 26.0  20.0 - 31.0 mmol/L Final   • Calcium 02/28/2019 9.1  8.7 - 10.4 mg/dL Final   • Total Protein 02/28/2019 6.0  5.7 - 8.2 g/dL Final   • Albumin 02/28/2019 4.07  3.20 - 4.80 g/dL Final   • ALT (SGPT) 02/28/2019 22  7 - 40 U/L Final   • AST (SGOT) 02/28/2019 23  0 - 33 U/L Final   • Alkaline Phosphatase 02/28/2019 90  25 - 100 U/L Final   • Total Bilirubin 02/28/2019 0.4  0.3 - 1.2 mg/dL Final   • eGFR Non African Amer 02/28/2019 62  >60 mL/min/1.73 Final   • Globulin 02/28/2019 1.9  gm/dL Final   • A/G Ratio 02/28/2019 2.1  1.5 - 2.5 g/dL Final   • BUN/Creatinine Ratio 02/28/2019 15.4  7.0 - 25.0 Final   • Anion Gap 02/28/2019 8.0  3.0 - 11.0 mmol/L Final   • Vitamin B-12 02/28/2019 1,077* 211 - 911 pg/mL Final   • 1,25-Dihydroxy, Vitamin D 02/28/2019 64.9  19.9 - 79.3 pg/mL Final   • Hemoglobin A1C 02/28/2019 6.80* 4.80 - 5.60 % Final   • Microalbumin, Urine 02/28/2019 <3.0  Not Estab. ug/mL Final   • Total Cholesterol 02/28/2019 174  0 - 200 mg/dL Final   • Triglycerides 02/28/2019 138  0 - 150 mg/dL Final   • HDL Cholesterol 02/28/2019 64* 40 - 60 mg/dL Final   • LDL Cholesterol  02/28/2019 91  0 - 130 mg/dL Final   • WBC 02/28/2019 10.95* 3.50 - 10.80 10*3/mm3 Final   • RBC 02/28/2019 4.24  3.89 - 5.14 10*6/mm3 Final   • Hemoglobin 02/28/2019 12.3  11.5 - 15.5 g/dL Final   • Hematocrit 02/28/2019 37.9  34.5 - 44.0 % Final   • MCV 02/28/2019 89.4  80.0 - 99.0 fL Final   • MCH 02/28/2019 29.0  27.0 - 31.0 pg Final   • MCHC 02/28/2019 32.5  32.0 - 36.0 g/dL Final   • RDW 02/28/2019 12.6  11.3 - 14.5 % Final   • RDW-SD 02/28/2019 40.7  37.0 - 54.0 fl Final   • MPV 02/28/2019 9.9  6.0 - 12.0 fL Final   • Platelets 02/28/2019 221  150 - 450 10*3/mm3 Final   • Neutrophil % 02/28/2019 77.3* 41.0 - 71.0 % Final   • Lymphocyte % 02/28/2019 14.5* 24.0 - 44.0 % Final   • Monocyte % 02/28/2019 6.6  0.0 - 12.0 % Final   • Eosinophil %  02/28/2019 1.2  0.0 - 3.0 % Final   • Basophil % 02/28/2019 0.4  0.0 - 1.0 % Final   • Immature Grans % 02/28/2019 1.9* 0.0 - 0.6 % Final   • Neutrophils, Absolute 02/28/2019 8.47* 1.50 - 8.30 10*3/mm3 Final   • Lymphocytes, Absolute 02/28/2019 1.59  0.60 - 4.80 10*3/mm3 Final   • Monocytes, Absolute 02/28/2019 0.72  0.00 - 1.00 10*3/mm3 Final   • Eosinophils, Absolute 02/28/2019 0.13  0.00 - 0.30 10*3/mm3 Final   • Basophils, Absolute 02/28/2019 0.04  0.00 - 0.20 10*3/mm3 Final   • Immature Grans, Absolute 02/28/2019 0.21* 0.00 - 0.05 10*3/mm3 Final           Assessment  Assessment/Plan   1. Type 2 diabetes mellitus without complication, without long-term current use of insulin (CMS/[facility])    2. Postablative hypothyroidism    3. 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  The general approach is to provide lowest dose which controls adrenal insufficiency  -BP good today  -A1C 6.8 2/28/19  -continue lisinopril 10 mg daily  -continue Levothyroxine 50 mcg a day.  -continue Januvia 100 mg daily   -meds refilled  -discussed needs better management of her depression    Follow-up in 3 months with Dr. Morales    22 min  of 30 min face-to-face visit time spent for coordination of care and counselling regarding identified problems as outlined in the objective, assessment and discussion portions of the documentation.

## 2019-03-27 ENCOUNTER — OFFICE VISIT (OUTPATIENT)
Dept: ORTHOPEDIC SURGERY | Facility: CLINIC | Age: 64
End: 2019-03-27

## 2019-03-27 DIAGNOSIS — S32.592D: Primary | ICD-10-CM

## 2019-03-27 PROCEDURE — 99213 OFFICE O/P EST LOW 20 MIN: CPT | Performed by: ORTHOPAEDIC SURGERY

## 2019-03-27 NOTE — PROGRESS NOTES
[facility] Note    Subjective     Chief Complaint   Patient presents with   • Left Hip - Follow-up     6 week follow up; Multiple closed stable fractures of ramus of left pubis        HPI    Karen Rubio is a 63 y.o. female.  She follows up today for left hemipelvis fracture.  She is doing well today, with improvement in pain in the hip.  She has 4 out of 10 pain currently, which is aching in quality.      Patient Active Problem List   Diagnosis   • Madison's disease (CMS/HCC)   • Uncontrolled diabetes mellitus type 2 without complications (CMS/HCC)   • Hyperlipidemia   • Hypertension   • Postablative hypothyroidism   • Cobalamin deficiency   • Vitamin D deficiency   • Allergic rhinitis   • GERD (gastroesophageal reflux disease)   • Hypothyroidism   • BAILEY (generalized anxiety disorder)   • Depressive disorder   • Lumbosacral spondylosis without myelopathy   • SIENA (obstructive sleep apnea)   • Anxiety   • Hypernatremia   • Altered taste   • History of arthritis   • History of asthma   • History of cancer   • History of chronic bronchitis   • History of fibromyalgia   • History of gout   • History of heart disease   • History of migraine headaches   • History of osteoporosis   • Thyroid disorder   • DDD (degenerative disc disease), lumbar   • Multilevel degenerative disc disease     Past Medical History:   Diagnosis Date   • Acute bronchitis    • Acute head trauma    • Arthritis    • Arthropathy of ankle and foot    • Arthropathy, multiple sites    • Asthma    • Cancer (CMS/HCC)    • Chronic bronchitis (CMS/HCC)    • Chronic constipation    • Cough    • Depression    • Diabetes mellitus (CMS/HCC)    • Dysuria    • Enthesopathy of ankle/tarsus    • Facial injury    • Fibromyalgia    • Fracture of left fibula    • Gall stone    • GERD (gastroesophageal reflux disease)    • Gout    • Hammer toe    • Heart disease    • Hyperlipidemia    • Idiopathic peripheral neuropathy    • Joint pain of  right hip on movement    • Lumbago    • Migraine    • Mixed sensory-motor polyneuropathy    • Myalgia and myositis    • Nausea and vomiting    • Osteoporosis    • Ovarian cancer (CMS/HCC)    • Ovarian cyst    • Overweight    • Pleurisy    • Pustule    • Rash     Last Impression: 09 Feb 2015  Likely contact dermatitis secondary to new clothing- rash has similarity to shingles but distribution does not correspond with shingles. Trial of topical steroid, sent in clobetasol. Refer to derm for f/u if no improvement or worsening of rash.   • Recurrent boils    • Sinusitis, acute    • Stress fracture    • Thyroid disorder    • Tibial plateau fracture, left    • Urinary incontinence    • UTI (urinary tract infection)    • Vitamin B12 deficiency      · Last Impression: 29 Jan 2015  Check labs as ordered. Gave pt vitamin b12 IM in office today. Will continue monthly shots to maintain level.  Phuong Almaguer (Internal Medicine)   • Vitamin D deficiency       Past Surgical History:   Procedure Laterality Date   • OTHER SURGICAL HISTORY      anastomosis of gallbladder   • OVARY SURGERY     • TUBAL ABDOMINAL LIGATION        Family History   Problem Relation Age of Onset   • Hyperlipidemia Mother    • Hypertension Mother    • Thyroid disease Mother    • Hyperlipidemia Father    • Hypertension Father    • Kidney disease Sister    • Thyroid disease Sister    • Thyroid disease Son    • Other Son         Suicide   • Heart attack Other    • Arthritis Other    • Cancer Other    • Mental illness Other    • Migraines Other    • Hyperlipidemia Other    • Hypertension Other    • Stroke Other      Social History     Socioeconomic History   • Marital status:      Spouse name: Not on file   • Number of children: Not on file   • Years of education: Not on file   • Highest education level: Not on file   Tobacco Use   • Smoking status: Never Smoker   • Smokeless tobacco: Never Used   Substance and Sexual Activity   • Alcohol use: No   •  Drug use: No      Current Outpatient Medications on File Prior to Visit   Medication Sig Dispense Refill   • albuterol (PROVENTIL HFA;VENTOLIN HFA) 108 (90 Base) MCG/ACT inhaler Inhale 2 puffs Every 4 (Four) Hours As Needed for Wheezing. 3.7 g 6   • ALPRAZolam (XANAX) 1 MG tablet Take 1 tablet by mouth 4 (Four) Times a Day. 120 tablet 2   • cetirizine (zyrTEC) 10 MG tablet TAKE 1 TABLET BY MOUTH EVERY DAY 30 tablet 4   • citalopram (CeleXA) 40 MG tablet Take 1 tablet by mouth Daily. 30 tablet 11   • cyanocobalamin 1000 MCG/ML injection INJECT 1ML EVERY 2 WEEKS AS DIRECTED 2 mL 3   • diclofenac (VOLTAREN) 75 MG EC tablet Take 75 mg by mouth 2 (Two) Times a Day.  0   • fluticasone (FLONASE) 50 MCG/ACT nasal spray 1 spray into each nostril Daily. 1 bottle 2   • gabapentin (NEURONTIN) 400 MG capsule Take 1 capsule by mouth 3 (Three) Times a Day. 90 capsule 1   • glucose blood (ONE TOUCH ULTRA TEST) test strip 1 each by Other route As Needed. 1-2 times a day     • hydrocortisone (CORTEF) 5 MG tablet TAKE 3 TABLETS BY MOUTH EVERY DAY IN THE MORNING AND ALSO TAKE 1 TABLET EVERY EVENING 120 tablet 5   • ibuprofen (ADVIL,MOTRIN) 600 MG tablet Take 1 tablet by mouth Every 6 (Six) Hours As Needed for Mild Pain . 90 tablet 5   • levothyroxine (SYNTHROID, LEVOTHROID) 50 MCG tablet Take 1 tablet by mouth Daily. 30 tablet 11   • lisinopril (PRINIVIL,ZESTRIL) 10 MG tablet Take 1 tablet by mouth Daily. 30 tablet 11   • montelukast (SINGULAIR) 10 MG tablet Take 1 tablet by mouth Every Night. 30 tablet 11   • ondansetron ODT (ZOFRAN-ODT) 4 MG disintegrating tablet Take  by mouth.     • ONETOUCH DELICA LANCETS 33G misc Checks x 1 / day     • oxyCODONE-acetaminophen (PERCOCET)  MG per tablet Take 1 tablet by mouth Every 4 (Four) Hours As Needed for Moderate Pain . 150 tablet 0   • promethazine (PHENERGAN) 25 MG tablet Take 1 tablet by mouth Every 6 (Six) Hours As Needed for Nausea or Vomiting. 20 tablet 1   • simvastatin (ZOCOR)  "20 MG tablet TAKE 1 TABLET BY MOUTH EVERY NIGHT. 30 tablet 9   • SITagliptin (JANUVIA) 100 MG tablet Take 1 tablet by mouth Daily. 30 tablet 5   • Syringe/Needle, Disp, (LUER LOCK SAFETY SYRINGES) 25G X 5/8\" 3 ML misc 1 each Every 14 (Fourteen) Days. For B12 injections 4 each 11   • tolterodine (DETROL) 2 MG tablet Take 2 mg by mouth.     • traMADol (ULTRAM) 50 MG tablet Take 1 tablet by mouth Every 8 (Eight) Hours.  1   • vitamin D (ERGOCALCIFEROL) 34445 units capsule capsule Every 7 days 4 capsule 3   • zolpidem (AMBIEN) 5 MG tablet TK 1 T PO HS  1     Current Facility-Administered Medications on File Prior to Visit   Medication Dose Route Frequency Provider Last Rate Last Dose   • cyanocobalamin injection 1,000 mcg  1,000 mcg Intramuscular Q28 Days Aleah Morales MD   1,000 mcg at 10/02/18 1604   • cyanocobalamin injection 1,000 mcg  1,000 mcg Intramuscular Q28 Days Becky Butler PA-C   1,000 mcg at 03/20/19 1510      Allergies   Allergen Reactions   • Valium [Diazepam]         Review of Systems   Constitutional: Positive for activity change, appetite change, chills, fatigue and unexpected weight change.   HENT: Positive for nosebleeds.    Eyes: Positive for redness and itching.   Respiratory: Positive for apnea, shortness of breath and wheezing.    Cardiovascular: Negative.    Gastrointestinal: Positive for constipation and nausea.   Endocrine: Positive for cold intolerance and heat intolerance.   Genitourinary: Negative.    Musculoskeletal: Positive for arthralgias (left hip) and back pain.   Skin: Negative.    Allergic/Immunologic: Negative.    Neurological: Positive for dizziness, weakness, light-headedness, numbness and headaches.   Psychiatric/Behavioral: Positive for confusion and sleep disturbance. The patient is nervous/anxious.         Objective      Physical Exam  There were no vitals taken for this visit.    There is no height or weight on file to calculate BMI.    General:   Mental Status:  " Alert   Appearance: Cooperative, in no acute distress   Build and Nutrition: Overweight female   Orientation: Alert and oriented to person, place and time   Posture: Normal   Gait: Limping on the left secondary to her knee    Lower Extremity:   Left Hip:    Tenderness:  None    Swelling:  None    Crepitus:  None    Range of motion:  External Rotation: 30°       Internal Rotation: 30°       Flexion:  100°       Extension:  0°    Deformities:  None  Functional testing: Positive Stinchfield        Imaging/Studies      Imaging Results (last 24 hours)     Procedure Component Value Units Date/Time    XR Hip With or Without Pelvis 1 View Left [[id number]] Resulted:  03/27/19 1407     Updated:  03/27/19 1407    Narrative:       Left Hip Radiographs  Indication: left hip pain  Views: low AP pelvis and lateral of the left hip    Comparison: 1/28/2019    Findings:   Healing superior and inferior rami fractures, with no displacement   compared to the previous films, with no unusual features in the hip joint   itself.          Assessment and Plan     Karen was seen today for follow-up.    Diagnoses and all orders for this visit:    Multiple closed stable fractures of ramus of left pubis with routine healing, subsequent encounter  -     XR Hip With or Without Pelvis 1 View Left        1. Multiple closed stable fractures of ramus of left pubis with routine healing, subsequent encounter        I reviewed my findings with the patient today.  Left hemipelvis fracture appears to be healing, and she may continue with weightbearing as tolerated.  Repeat x-ray in 6-8 weeks, but sooner for any problems.    She was also telling me about her left knee, and that she had an old injury to it and has significant arthritis to the best of her knowledge.  She will make a separate appointment to have that evaluated in the near future.    Return in about 6 weeks (around 5/8/2019).      Medical Decision Making  Management Options : close treatment of  fracture or dislocation  Data/Risk: radiology tests and independent visualization of imaging, lab tests, or EMG/NCV      Juice Ya MD  03/27/19  2:16 PM

## 2019-04-03 DIAGNOSIS — E89.0 POSTABLATIVE HYPOTHYROIDISM: ICD-10-CM

## 2019-04-03 DIAGNOSIS — E11.9 TYPE 2 DIABETES MELLITUS WITHOUT COMPLICATION, WITHOUT LONG-TERM CURRENT USE OF INSULIN (HCC): ICD-10-CM

## 2019-04-03 DIAGNOSIS — E27.1 ADDISON'S DISEASE (HCC): ICD-10-CM

## 2019-04-03 RX ORDER — HYDROCORTISONE 5 MG/1
TABLET ORAL
Qty: 120 TABLET | Refills: 5 | OUTPATIENT
Start: 2019-04-03

## 2019-04-03 RX ORDER — LEVOTHYROXINE SODIUM 0.05 MG/1
50 TABLET ORAL DAILY
Qty: 30 TABLET | Refills: 5 | Status: SHIPPED | OUTPATIENT
Start: 2019-04-03 | End: 2019-06-28 | Stop reason: SDUPTHER

## 2019-04-03 RX ORDER — SITAGLIPTIN 100 MG/1
TABLET, FILM COATED ORAL
Qty: 30 TABLET | Refills: 0 | OUTPATIENT
Start: 2019-04-03

## 2019-04-07 RX ORDER — SITAGLIPTIN 100 MG/1
TABLET, FILM COATED ORAL
Qty: 30 TABLET | Refills: 0 | OUTPATIENT
Start: 2019-04-07

## 2019-04-13 RX ORDER — SITAGLIPTIN 100 MG/1
TABLET, FILM COATED ORAL
Qty: 30 TABLET | Refills: 0 | OUTPATIENT
Start: 2019-04-13

## 2019-04-17 ENCOUNTER — OFFICE VISIT (OUTPATIENT)
Dept: ORTHOPEDIC SURGERY | Facility: CLINIC | Age: 64
End: 2019-04-17

## 2019-04-17 VITALS — WEIGHT: 148.15 LBS | HEIGHT: 55 IN | HEART RATE: 65 BPM | BODY MASS INDEX: 34.29 KG/M2 | OXYGEN SATURATION: 96 %

## 2019-04-17 DIAGNOSIS — M17.32 POST-TRAUMATIC OSTEOARTHRITIS OF LEFT KNEE: Primary | ICD-10-CM

## 2019-04-17 PROCEDURE — 99214 OFFICE O/P EST MOD 30 MIN: CPT | Performed by: ORTHOPAEDIC SURGERY

## 2019-04-17 PROCEDURE — 20610 DRAIN/INJ JOINT/BURSA W/O US: CPT | Performed by: ORTHOPAEDIC SURGERY

## 2019-04-17 RX ORDER — TRIAMCINOLONE ACETONIDE 40 MG/ML
40 INJECTION, SUSPENSION INTRA-ARTICULAR; INTRAMUSCULAR
Status: COMPLETED | OUTPATIENT
Start: 2019-04-17 | End: 2019-04-17

## 2019-04-17 RX ORDER — ROPIVACAINE HYDROCHLORIDE 5 MG/ML
4 INJECTION, SOLUTION EPIDURAL; INFILTRATION; PERINEURAL
Status: COMPLETED | OUTPATIENT
Start: 2019-04-17 | End: 2019-04-17

## 2019-04-17 RX ADMIN — TRIAMCINOLONE ACETONIDE 40 MG: 40 INJECTION, SUSPENSION INTRA-ARTICULAR; INTRAMUSCULAR at 15:55

## 2019-04-17 RX ADMIN — ROPIVACAINE HYDROCHLORIDE 4 ML: 5 INJECTION, SOLUTION EPIDURAL; INFILTRATION; PERINEURAL at 15:55

## 2019-04-17 NOTE — PROGRESS NOTES
Procedure   Large Joint Arthrocentesis: L knee  Date/Time: 4/17/2019 3:55 PM  Consent given by: patient  Site marked: site marked  Timeout: Immediately prior to procedure a time out was called to verify the correct patient, procedure, equipment, support staff and site/side marked as required   Supporting Documentation  Indications: pain   Procedure Details  Location: knee - L knee  Preparation: Patient was prepped and draped in the usual sterile fashion  Needle size: 22 G  Approach: anterolateral  Medications administered: 4 mL ropivacaine 0.5 %; 40 mg triamcinolone acetonide 40 MG/ML  Patient tolerance: patient tolerated the procedure well with no immediate complications

## 2019-04-17 NOTE — PROGRESS NOTES
[facility]    Subjective     Chief Complaint   Patient presents with   • Left Knee - Pain        HPI    Karen Rubio is a 63 y.o. female.  She presents today for evaluation of left knee pain.  She has had pain for 3 years, following an injury with a tibial plateau fracture.  The pain is associated with stiffness and giving way, and worsens with walking, standing and climbing stairs.  Pain is 9 out of 10, aching, throbbing, stabbing and shooting.  Her fracture was treated nonoperatively.      Patient Active Problem List   Diagnosis   • Cedar's disease (CMS/HCC)   • Uncontrolled diabetes mellitus type 2 without complications (CMS/HCC)   • Hyperlipidemia   • Hypertension   • Postablative hypothyroidism   • Cobalamin deficiency   • Vitamin D deficiency   • Allergic rhinitis   • GERD (gastroesophageal reflux disease)   • Hypothyroidism   • BAILEY (generalized anxiety disorder)   • Depressive disorder   • Lumbosacral spondylosis without myelopathy   • SIENA (obstructive sleep apnea)   • Anxiety   • Hypernatremia   • Altered taste   • History of arthritis   • History of asthma   • History of cancer   • History of chronic bronchitis   • History of fibromyalgia   • History of gout   • History of heart disease   • History of migraine headaches   • History of osteoporosis   • Thyroid disorder   • DDD (degenerative disc disease), lumbar   • Multilevel degenerative disc disease     Past Medical History:   Diagnosis Date   • Acute bronchitis    • Acute head trauma    • Arthritis    • Arthropathy of ankle and foot    • Arthropathy, multiple sites    • Asthma    • Cancer (CMS/HCC)    • Chronic bronchitis (CMS/HCC)    • Chronic constipation    • Cough    • Depression    • Diabetes mellitus (CMS/HCC)    • Dysuria    • Enthesopathy of ankle/tarsus    • Facial injury    • Fibromyalgia    • Fracture of left fibula    • Gall stone    • GERD (gastroesophageal reflux disease)    • Gout    • Hammer toe    •  Heart disease    • Hyperlipidemia    • Idiopathic peripheral neuropathy    • Joint pain of right hip on movement    • Lumbago    • Migraine    • Mixed sensory-motor polyneuropathy    • Myalgia and myositis    • Nausea and vomiting    • Osteoporosis    • Ovarian cancer (CMS/HCC)    • Ovarian cyst    • Overweight    • Pleurisy    • Pustule    • Rash     Last Impression: 09 Feb 2015  Likely contact dermatitis secondary to new clothing- rash has similarity to shingles but distribution does not correspond with shingles. Trial of topical steroid, sent in clobetasol. Refer to derm for f/u if no improvement or worsening of rash.   • Recurrent boils    • Sinusitis, acute    • Stress fracture    • Thyroid disorder    • Tibial plateau fracture, left    • Urinary incontinence    • UTI (urinary tract infection)    • Vitamin B12 deficiency      · Last Impression: 29 Jan 2015  Check labs as ordered. Gave pt vitamin b12 IM in office today. Will continue monthly shots to maintain level.  Phuong Almaguer (Internal Medicine)   • Vitamin D deficiency       Past Surgical History:   Procedure Laterality Date   • OTHER SURGICAL HISTORY      anastomosis of gallbladder   • OVARY SURGERY     • TUBAL ABDOMINAL LIGATION        Family History   Problem Relation Age of Onset   • Hyperlipidemia Mother    • Hypertension Mother    • Thyroid disease Mother    • Hyperlipidemia Father    • Hypertension Father    • Kidney disease Sister    • Thyroid disease Sister    • Thyroid disease Son    • Other Son         Suicide   • Heart attack Other    • Arthritis Other    • Cancer Other    • Mental illness Other    • Migraines Other    • Hyperlipidemia Other    • Hypertension Other    • Stroke Other      Social History     Socioeconomic History   • Marital status:      Spouse name: Not on file   • Number of children: Not on file   • Years of education: Not on file   • Highest education level: Not on file   Tobacco Use   • Smoking status: Never Smoker    • Smokeless tobacco: Never Used   Substance and Sexual Activity   • Alcohol use: No   • Drug use: No      Current Outpatient Medications on File Prior to Visit   Medication Sig Dispense Refill   • albuterol (PROVENTIL HFA;VENTOLIN HFA) 108 (90 Base) MCG/ACT inhaler Inhale 2 puffs Every 4 (Four) Hours As Needed for Wheezing. 3.7 g 6   • ALPRAZolam (XANAX) 1 MG tablet Take 1 tablet by mouth 4 (Four) Times a Day. 120 tablet 2   • cetirizine (zyrTEC) 10 MG tablet TAKE 1 TABLET BY MOUTH EVERY DAY 30 tablet 4   • citalopram (CeleXA) 40 MG tablet Take 1 tablet by mouth Daily. 30 tablet 11   • cyanocobalamin 1000 MCG/ML injection INJECT 1ML EVERY 2 WEEKS AS DIRECTED 2 mL 3   • diclofenac (VOLTAREN) 75 MG EC tablet Take 75 mg by mouth 2 (Two) Times a Day.  0   • fluticasone (FLONASE) 50 MCG/ACT nasal spray 1 spray into each nostril Daily. 1 bottle 2   • gabapentin (NEURONTIN) 400 MG capsule Take 1 capsule by mouth 3 (Three) Times a Day. 90 capsule 1   • glucose blood (ONE TOUCH ULTRA TEST) test strip 1 each by Other route As Needed. 1-2 times a day     • hydrocortisone (CORTEF) 5 MG tablet TAKE 3 TABLETS BY MOUTH EVERY DAY IN THE MORNING AND ALSO TAKE 1 TABLET EVERY EVENING 120 tablet 5   • ibuprofen (ADVIL,MOTRIN) 600 MG tablet Take 1 tablet by mouth Every 6 (Six) Hours As Needed for Mild Pain . 90 tablet 5   • levothyroxine (SYNTHROID, LEVOTHROID) 50 MCG tablet Take 1 tablet by mouth Daily. 30 tablet 5   • lisinopril (PRINIVIL,ZESTRIL) 10 MG tablet Take 1 tablet by mouth Daily. 30 tablet 11   • montelukast (SINGULAIR) 10 MG tablet Take 1 tablet by mouth Every Night. 30 tablet 11   • ondansetron ODT (ZOFRAN-ODT) 4 MG disintegrating tablet Take  by mouth.     • ONETOUCH DELICA LANCETS 33G misc Checks x 1 / day     • oxyCODONE-acetaminophen (PERCOCET)  MG per tablet Take 1 tablet by mouth Every 4 (Four) Hours As Needed for Moderate Pain . 150 tablet 0   • promethazine (PHENERGAN) 25 MG tablet Take 1 tablet by mouth  "Every 6 (Six) Hours As Needed for Nausea or Vomiting. 20 tablet 1   • simvastatin (ZOCOR) 20 MG tablet TAKE 1 TABLET BY MOUTH EVERY NIGHT. 30 tablet 9   • SITagliptin (JANUVIA) 100 MG tablet Take 1 tablet by mouth Daily. 30 tablet 5   • Syringe/Needle, Disp, (LUER LOCK SAFETY SYRINGES) 25G X 5/8\" 3 ML misc 1 each Every 14 (Fourteen) Days. For B12 injections 4 each 11   • tolterodine (DETROL) 2 MG tablet Take 2 mg by mouth.     • vitamin D (ERGOCALCIFEROL) 32503 units capsule capsule Every 7 days 4 capsule 3   • zolpidem (AMBIEN) 5 MG tablet TK 1 T PO HS  1   • [DISCONTINUED] traMADol (ULTRAM) 50 MG tablet Take 1 tablet by mouth Every 8 (Eight) Hours.  1     Current Facility-Administered Medications on File Prior to Visit   Medication Dose Route Frequency Provider Last Rate Last Dose   • cyanocobalamin injection 1,000 mcg  1,000 mcg Intramuscular Q28 Days Aleah Morales MD   1,000 mcg at 10/02/18 1604   • cyanocobalamin injection 1,000 mcg  1,000 mcg Intramuscular Q28 Days Becky Butler PA-C   1,000 mcg at 03/20/19 1510      Allergies   Allergen Reactions   • Valium [Diazepam]         Review of Systems   Constitutional: Positive for appetite change, chills, fatigue and unexpected weight change. Negative for activity change, diaphoresis and fever.   HENT: Negative for congestion, dental problem, drooling, ear discharge, ear pain, facial swelling, hearing loss, mouth sores, nosebleeds, postnasal drip, rhinorrhea, sinus pressure, sneezing, sore throat, tinnitus, trouble swallowing and voice change.    Eyes: Positive for redness. Negative for photophobia, pain, itching and visual disturbance.   Respiratory: Positive for apnea and shortness of breath. Negative for cough, choking, chest tightness, wheezing and stridor.    Cardiovascular: Negative for chest pain, palpitations and leg swelling.   Gastrointestinal: Negative for abdominal distention, abdominal pain, anal bleeding, blood in stool, constipation, diarrhea, " "nausea, rectal pain and vomiting.   Endocrine: Positive for cold intolerance and heat intolerance. Negative for polydipsia, polyphagia and polyuria.   Genitourinary: Negative for decreased urine volume, difficulty urinating, dysuria, enuresis, flank pain, frequency, genital sores, hematuria and urgency.   Musculoskeletal: Positive for back pain. Negative for arthralgias, gait problem, joint swelling, myalgias, neck pain and neck stiffness.   Skin: Negative for color change, pallor, rash and wound.   Allergic/Immunologic: Negative for environmental allergies, food allergies and immunocompromised state.   Neurological: Positive for dizziness and light-headedness. Negative for tremors, seizures, syncope, facial asymmetry, speech difficulty, weakness, numbness and headaches.   Hematological: Negative for adenopathy. Bruises/bleeds easily.   Psychiatric/Behavioral: Positive for agitation. Negative for behavioral problems, confusion, decreased concentration, dysphoric mood, hallucinations, self-injury, sleep disturbance and suicidal ideas. The patient is not nervous/anxious and is not hyperactive.         Objective      Physical Exam  Pulse 65   Ht 133.4 cm (52.52\")   Wt 67.2 kg (148 lb 2.4 oz)   SpO2 96%   Breastfeeding? No   BMI 37.76 kg/m²     Body mass index is 37.76 kg/m².    General:   Mental Status:  Alert   Appearance: Cooperative, in no acute distress   Build and Nutrition: Overweight short statured female   Orientation: Alert and oriented to person, place and time   Posture: Normal   Gait: Limping on the left    Integument:   Left knee: No skin lesions, no rash, no ecchymosis    Neurologic:   Sensation:    Left foot: Intact to light touch on the dorsal and plantar aspect   Motor:  Left lower extremity: 5/5 quadriceps, hamstrings, ankle dorsiflexors, and ankle plantar flexors  Vascular:   Left lower extremity: 2+ dorsalis pedis pulse, prompt capillary refill    Lower Extremities:   Left Knee:    Tenderness: "  Medial and lateral joint line tenderness    Effusion:  None    Swelling:  None    Crepitus:  Positive    Atrophy:  None    Range of motion:  Extension: 0°       Flexion: 120°  Instability:  No varus laxity, no valgus laxity, negative anterior drawer  Deformities:  None      Imaging/Studies  Imaging Results (last 24 hours)     Procedure Component Value Units Date/Time    XR Knee 4+ View Left [[id number]] Resulted:  04/17/19 1659     Updated:  04/17/19 1701    Narrative:       Left Knee Radiographs  Indication: left knee pain  Views: Standing AP's and skiers of both knees, with lateral and sunrise   views of the left knee    Comparison: no prior studies available    Findings:   Evidence of a healed lateral tibial plateau fracture, with healed proximal   fibula fracture, with depression of the lateral joint surface,   patellofemoral degeneration, increased posterior slope of the tibia, with   medial degeneration.    Impression: Posttraumatic arthritis, left knee.            Assessment and Plan     Karen was seen today for pain.    Diagnoses and all orders for this visit:    Post-traumatic osteoarthritis of left knee  -     XR Knee 4+ View Left  -     Large Joint Arthrocentesis: L knee        1. Post-traumatic osteoarthritis of left knee        I reviewed my findings with the patient today.  She has posttraumatic arthritic changes in the left knee secondary to the tibial plateau fracture, but likely had pre-existing degenerative changes also.  No previous injections, therefore I did offer her an intra-articular injection.  She tolerated the injection well today, and had 100% relief just a few minutes following the injection.  She may be a candidate for knee replacement surgery in the future.  I will see her back in 3 months, but sooner for any problems.    Return in about 3 months (around 7/17/2019).      Medical Decision Making  Management Options : prescription/IM medicine  Data/Risk: radiology tests and independent  visualization of imaging, lab tests, or EMG/NCV      Juice Ya MD  04/17/19  5:10 PM

## 2019-04-29 ENCOUNTER — TELEPHONE (OUTPATIENT)
Dept: ORTHOPEDIC SURGERY | Facility: CLINIC | Age: 64
End: 2019-04-29

## 2019-04-29 NOTE — TELEPHONE ENCOUNTER
This patient had a cortisone injection on 4/17/2019 and she said that she was doing good until over the weekend. She was calling due to the extreme pain that she was having and was wondering if there is anything can be done. She can be reached at [phone].

## 2019-04-29 NOTE — TELEPHONE ENCOUNTER
I spoke with Ms. Rubio and she stated she has been going up and down her stairs a lot and is having some knee discomfort since the injection. She denied it being red, hot or swollen. I told her to rest, ice and elevate and see if that helps, if not to call us back and we will get her back in to see  .

## 2019-05-01 DIAGNOSIS — E27.1 ADDISON'S DISEASE (HCC): ICD-10-CM

## 2019-05-02 RX ORDER — MONTELUKAST SODIUM 10 MG/1
TABLET ORAL
Qty: 30 TABLET | Refills: 6 | Status: SHIPPED | OUTPATIENT
Start: 2019-05-02 | End: 2019-07-19 | Stop reason: SDUPTHER

## 2019-05-02 RX ORDER — HYDROCORTISONE 5 MG/1
TABLET ORAL
Qty: 120 TABLET | Refills: 0 | OUTPATIENT
Start: 2019-05-02

## 2019-05-08 ENCOUNTER — TELEPHONE (OUTPATIENT)
Dept: INTERNAL MEDICINE | Facility: CLINIC | Age: 64
End: 2019-05-08

## 2019-05-08 DIAGNOSIS — E11.9 TYPE 2 DIABETES MELLITUS WITHOUT COMPLICATION, WITHOUT LONG-TERM CURRENT USE OF INSULIN (HCC): ICD-10-CM

## 2019-05-08 NOTE — TELEPHONE ENCOUNTER
PT'S INSURANCE WAS CALLING TO SEE IF IT WAS OKAY WITH [employer] IF THE PT RECEIVES A 90 DAY SUPPLY OF JANUVIA INSTEAD OF A 30 DAY SUPPLY.    IF SO, SEND NEW RX TO [facility] IN [place]

## 2019-05-09 ENCOUNTER — TELEPHONE (OUTPATIENT)
Dept: INTERNAL MEDICINE | Facility: CLINIC | Age: 64
End: 2019-05-09

## 2019-05-09 DIAGNOSIS — E27.1 ADDISON'S DISEASE (HCC): ICD-10-CM

## 2019-05-09 RX ORDER — HYDROCORTISONE 5 MG/1
TABLET ORAL
Qty: 120 TABLET | Refills: 5 | Status: SHIPPED | OUTPATIENT
Start: 2019-05-09 | End: 2019-06-28 | Stop reason: SDUPTHER

## 2019-05-09 NOTE — TELEPHONE ENCOUNTER
PATIENT STATES THAT SHE CAN NOT FIND HER HYDROCORTISONE 5 MG MEDICATION AND SHE STATES THAT SHE NEEDS THIS MEDICATION AS SOON AS POSSIBLE. PLEASE GIVE THE PATIENT A CALL BACK TODAY [phone].

## 2019-05-09 NOTE — TELEPHONE ENCOUNTER
Per Dr. Ding I called pharmacy to ask for them to fill pt RX, they stated that would.  Attempted to contact patient,LMOM advising her of message

## 2019-05-15 ENCOUNTER — OFFICE VISIT (OUTPATIENT)
Dept: ORTHOPEDIC SURGERY | Facility: CLINIC | Age: 64
End: 2019-05-15

## 2019-05-15 VITALS — WEIGHT: 141.09 LBS | HEIGHT: 55 IN | HEART RATE: 69 BPM | OXYGEN SATURATION: 97 % | BODY MASS INDEX: 32.65 KG/M2

## 2019-05-15 DIAGNOSIS — S32.592D: ICD-10-CM

## 2019-05-15 DIAGNOSIS — M17.32 POST-TRAUMATIC OSTEOARTHRITIS OF LEFT KNEE: Primary | ICD-10-CM

## 2019-05-15 PROCEDURE — 99213 OFFICE O/P EST LOW 20 MIN: CPT | Performed by: ORTHOPAEDIC SURGERY

## 2019-05-15 NOTE — PROGRESS NOTES
[facility] Note    Subjective     Chief Complaint   Patient presents with   • Left Knee - Follow-up     1 month   • Pelvis - Follow-up     1 month        HPI    Karen Rubio is a 63 y.o. female.  She follows up today for her left hip and left knee.  Left hip continues to bother her, and the left knee responded to the intra-articular injection.  She did have a left hemipelvis fracture about 6 months ago.  She is ambulatory without external aids.  The pain is 7 out of 10, burning, throbbing and stabbing.      Patient Active Problem List   Diagnosis   • [place]'s disease (CMS/HCC)   • Uncontrolled diabetes mellitus type 2 without complications (CMS/HCC)   • Hyperlipidemia   • Hypertension   • Postablative hypothyroidism   • Cobalamin deficiency   • Vitamin D deficiency   • Allergic rhinitis   • GERD (gastroesophageal reflux disease)   • Hypothyroidism   • BAILEY (generalized anxiety disorder)   • Depressive disorder   • Lumbosacral spondylosis without myelopathy   • SIENA (obstructive sleep apnea)   • Anxiety   • Hypernatremia   • Altered taste   • History of arthritis   • History of asthma   • History of cancer   • History of chronic bronchitis   • History of fibromyalgia   • History of gout   • History of heart disease   • History of migraine headaches   • History of osteoporosis   • Thyroid disorder   • DDD (degenerative disc disease), lumbar   • Multilevel degenerative disc disease     Past Medical History:   Diagnosis Date   • Acute bronchitis    • Acute head trauma    • Arthritis    • Arthropathy of ankle and foot    • Arthropathy, multiple sites    • Asthma    • Cancer (CMS/HCC)    • Chronic bronchitis (CMS/HCC)    • Chronic constipation    • Cough    • Depression    • Diabetes mellitus (CMS/HCC)    • Dysuria    • Enthesopathy of ankle/tarsus    • Facial injury    • Fibromyalgia    • Fracture of left fibula    • Gall stone    • GERD (gastroesophageal reflux disease)    • Gout    • Hammer  toe    • Heart disease    • Hyperlipidemia    • Idiopathic peripheral neuropathy    • Joint pain of right hip on movement    • Lumbago    • Migraine    • Mixed sensory-motor polyneuropathy    • Myalgia and myositis    • Nausea and vomiting    • Osteoporosis    • Ovarian cancer (CMS/HCC)    • Ovarian cyst    • Overweight    • Pleurisy    • Pustule    • Rash     Last Impression: 09 Feb 2015  Likely contact dermatitis secondary to new clothing- rash has similarity to shingles but distribution does not correspond with shingles. Trial of topical steroid, sent in clobetasol. Refer to derm for f/u if no improvement or worsening of rash.   • Recurrent boils    • Sinusitis, acute    • Stress fracture    • Thyroid disorder    • Tibial plateau fracture, left    • Urinary incontinence    • UTI (urinary tract infection)    • Vitamin B12 deficiency      · Last Impression: 29 Jan 2015  Check labs as ordered. Gave pt vitamin b12 IM in office today. Will continue monthly shots to maintain level.  Phuong Almaguer (Internal Medicine)   • Vitamin D deficiency       Past Surgical History:   Procedure Laterality Date   • OTHER SURGICAL HISTORY      anastomosis of gallbladder   • OVARY SURGERY     • TUBAL ABDOMINAL LIGATION        Family History   Problem Relation Age of Onset   • Hyperlipidemia Mother    • Hypertension Mother    • Thyroid disease Mother    • Hyperlipidemia Father    • Hypertension Father    • Kidney disease Sister    • Thyroid disease Sister    • Thyroid disease Son    • Other Son         Suicide   • Heart attack Other    • Arthritis Other    • Cancer Other    • Mental illness Other    • Migraines Other    • Hyperlipidemia Other    • Hypertension Other    • Stroke Other      Social History     Socioeconomic History   • Marital status:      Spouse name: Not on file   • Number of children: Not on file   • Years of education: Not on file   • Highest education level: Not on file   Tobacco Use   • Smoking status: Never  Smoker   • Smokeless tobacco: Never Used   Substance and Sexual Activity   • Alcohol use: No   • Drug use: No   • Sexual activity: Defer      Current Outpatient Medications on File Prior to Visit   Medication Sig Dispense Refill   • albuterol (PROVENTIL HFA;VENTOLIN HFA) 108 (90 Base) MCG/ACT inhaler Inhale 2 puffs Every 4 (Four) Hours As Needed for Wheezing. 3.7 g 6   • ALPRAZolam (XANAX) 1 MG tablet Take 1 tablet by mouth 4 (Four) Times a Day. 120 tablet 2   • cetirizine (zyrTEC) 10 MG tablet TAKE 1 TABLET BY MOUTH EVERY DAY 30 tablet 4   • citalopram (CeleXA) 40 MG tablet Take 1 tablet by mouth Daily. 30 tablet 11   • cyanocobalamin 1000 MCG/ML injection INJECT 1ML EVERY 2 WEEKS AS DIRECTED 2 mL 3   • diclofenac (VOLTAREN) 75 MG EC tablet Take 75 mg by mouth 2 (Two) Times a Day.  0   • gabapentin (NEURONTIN) 400 MG capsule Take 1 capsule by mouth 3 (Three) Times a Day. 90 capsule 1   • glucose blood (ONE TOUCH ULTRA TEST) test strip 1 each by Other route As Needed. 1-2 times a day     • hydrocortisone (CORTEF) 5 MG tablet TAKE 3 TABLETS BY MOUTH EVERY DAY IN THE MORNING AND ALSO TAKE 1 TABLET EVERY EVENING 120 tablet 5   • ibuprofen (ADVIL,MOTRIN) 600 MG tablet Take 1 tablet by mouth Every 6 (Six) Hours As Needed for Mild Pain . 90 tablet 5   • levothyroxine (SYNTHROID, LEVOTHROID) 50 MCG tablet Take 1 tablet by mouth Daily. 30 tablet 5   • lisinopril (PRINIVIL,ZESTRIL) 10 MG tablet Take 1 tablet by mouth Daily. 30 tablet 11   • montelukast (SINGULAIR) 10 MG tablet TAKE 1 TABLET BY MOUTH ONCE DAILY AT NIGHT 30 tablet 6   • ondansetron ODT (ZOFRAN-ODT) 4 MG disintegrating tablet Take  by mouth.     • ONETOUCH DELICA LANCETS 33G misc Checks x 1 / day     • oxyCODONE-acetaminophen (PERCOCET)  MG per tablet Take 1 tablet by mouth Every 4 (Four) Hours As Needed for Moderate Pain . 150 tablet 0   • promethazine (PHENERGAN) 25 MG tablet Take 1 tablet by mouth Every 6 (Six) Hours As Needed for Nausea or Vomiting.  "20 tablet 1   • simvastatin (ZOCOR) 20 MG tablet TAKE 1 TABLET BY MOUTH EVERY NIGHT. 30 tablet 9   • SITagliptin (JANUVIA) 100 MG tablet Take 1 tablet by mouth Daily. 90 tablet 1   • Syringe/Needle, Disp, (LUER LOCK SAFETY SYRINGES) 25G X 5/8\" 3 ML misc 1 each Every 14 (Fourteen) Days. For B12 injections 4 each 11   • tolterodine (DETROL) 2 MG tablet Take 2 mg by mouth.     • vitamin D (ERGOCALCIFEROL) 04920 units capsule capsule Every 7 days 4 capsule 3   • zolpidem (AMBIEN) 5 MG tablet TK 1 T PO HS  1     Current Facility-Administered Medications on File Prior to Visit   Medication Dose Route Frequency Provider Last Rate Last Dose   • cyanocobalamin injection 1,000 mcg  1,000 mcg Intramuscular Q28 Days Aleah Morales MD   1,000 mcg at 10/02/18 1604   • cyanocobalamin injection 1,000 mcg  1,000 mcg Intramuscular Q28 Days Becky Butler PA-C   1,000 mcg at 03/20/19 1510      Allergies   Allergen Reactions   • Valium [Diazepam]         Review of Systems   Constitutional: Positive for appetite change, chills and fatigue. Negative for activity change, diaphoresis, fever and unexpected weight change.        Night sweats   HENT: Negative.  Negative for congestion, dental problem, drooling, ear discharge, ear pain, facial swelling, hearing loss, mouth sores, nosebleeds, postnasal drip, rhinorrhea, sinus pressure, sneezing, sore throat, tinnitus, trouble swallowing and voice change.    Eyes: Negative.  Negative for photophobia, pain, discharge, redness, itching and visual disturbance.   Respiratory: Negative.  Negative for apnea, cough, choking, chest tightness, shortness of breath, wheezing and stridor.    Cardiovascular: Negative.  Negative for chest pain, palpitations and leg swelling.   Gastrointestinal: Negative.  Negative for abdominal distention, abdominal pain, anal bleeding, blood in stool, constipation, diarrhea, nausea, rectal pain and vomiting.   Endocrine: Positive for cold intolerance and heat " "intolerance. Negative for polydipsia, polyphagia and polyuria.   Genitourinary: Positive for difficulty urinating and urgency. Negative for decreased urine volume, dysuria, enuresis, flank pain, frequency, genital sores and hematuria.   Musculoskeletal: Positive for arthralgias, back pain, joint swelling and neck stiffness. Negative for gait problem, myalgias and neck pain.   Skin: Negative.  Negative for color change, pallor, rash and wound.   Allergic/Immunologic: Positive for environmental allergies. Negative for food allergies and immunocompromised state.   Neurological: Positive for dizziness, weakness and numbness. Negative for tremors, seizures, syncope, facial asymmetry, speech difficulty, light-headedness and headaches.   Hematological: Negative.  Negative for adenopathy. Does not bruise/bleed easily.   Psychiatric/Behavioral: Positive for confusion, decreased concentration and sleep disturbance. Negative for agitation, behavioral problems, dysphoric mood, hallucinations, self-injury and suicidal ideas. The patient is nervous/anxious and is hyperactive.         Objective      Physical Exam  Pulse 69   Ht 133.4 cm (52.52\")   Wt 64 kg (141 lb 1.5 oz)   SpO2 97%   BMI 35.96 kg/m²     Body mass index is 35.96 kg/m².    General:   Mental Status:  Alert   Appearance: Cooperative, in no acute distress   Build and Nutrition: Overweight female   Orientation: Alert and oriented to person, place and time   Posture: Normal   Gait: Mild limp on the left  Integument:   Left knee: No skin lesions, no rash, no ecchymosis    Lower Extremity:   Left Hip:    Tenderness:  None    Swelling:  None    Crepitus:  None    Range of motion:  External Rotation: 30°       Internal Rotation: 30°       Flexion:  100°       Extension:  0°    Deformities:  None  Functional testing: Positive Stinchfield    No leg length discrepancy   Left Knee:    Tenderness:  Medial and lateral joint line tenderness    Effusion:  None    Swelling: "  None    Crepitus: Positive    Range of motion:  Extension: 0°       Flexion: 120°  Instability: No varus laxity, no valgus laxity, negative anterior drawer  Deformities:  None      Imaging/Studies  Imaging Results (last 24 hours)     Procedure Component Value Units Date/Time    XR Hip With or Without Pelvis 1 View Left [[id number]] Resulted:  05/15/19 1423     Updated:  05/15/19 1424    Narrative:       Pelvis Radiograph  Indication: pain, follow-up fracture left hemipelvis  Views: AP view    Comparison: 3/27/2019    Findings:  Left hemipelvis fracture appears to be healed, in good position, with no   displacement compared to the previous films.            Assessment and Plan     Karen was seen today for follow-up and follow-up.    Diagnoses and all orders for this visit:    Post-traumatic osteoarthritis of left knee    Multiple closed stable fractures of ramus of left pubis with routine healing, subsequent encounter  -     XR Hip With or Without Pelvis 1 View Left  -     MRI Pelvis Without Contrast; Future        1. Post-traumatic osteoarthritis of left knee    2. Multiple closed stable fractures of ramus of left pubis with routine healing, subsequent encounter        I reviewed my findings with the patient and her  today.  Although her left hemipelvis fracture appears to be healed, she does have pain, and I recommend an MRI.  I will see her back after the MRI.  Regarding her left knee, did respond to the intra-articular injection.  Repeat intra-articular injection may be concerned in the future, but it is too soon to reinject at this point.    Return for After Imaging Study.      Medical Decision Making  Data/Risk: radiology tests      Juice Ya MD  05/15/19  2:41 PM

## 2019-05-15 NOTE — TELEPHONE ENCOUNTER
PATIENT CALLED TO FOLLOW UP ON REFILL REQUEST. ADVISED PATIENT THAT THIS RX WAS SENT TO [facility] ON 5/9/2019

## 2019-05-26 RX ORDER — NEEDLES, FILTER 19GX1 1/2"
NEEDLE, DISPOSABLE MISCELLANEOUS
Qty: 4 EACH | Refills: 0 | Status: SHIPPED | OUTPATIENT
Start: 2019-05-26 | End: 2019-07-31 | Stop reason: SDUPTHER

## 2019-06-04 DIAGNOSIS — E11.9 TYPE 2 DIABETES MELLITUS WITHOUT COMPLICATION, WITHOUT LONG-TERM CURRENT USE OF INSULIN (HCC): ICD-10-CM

## 2019-06-10 ENCOUNTER — TELEPHONE (OUTPATIENT)
Dept: ORTHOPEDIC SURGERY | Facility: CLINIC | Age: 64
End: 2019-06-10

## 2019-06-10 NOTE — TELEPHONE ENCOUNTER
CALLED PATIENT TO REMIND HER OF MISSED APPOINTMENT TODAY. LEFT VOICEMAIL TO CALL BACK AND RESCHEDULE. SENT NO SHOW LETTER.

## 2019-06-13 RX ORDER — MONTELUKAST SODIUM 10 MG/1
TABLET ORAL
Qty: 30 TABLET | Refills: 6 | OUTPATIENT
Start: 2019-06-13

## 2019-06-13 RX ORDER — FLUTICASONE PROPIONATE 50 MCG
SPRAY, SUSPENSION (ML) NASAL
Qty: 1 BOTTLE | Refills: 0 | Status: SHIPPED | OUTPATIENT
Start: 2019-06-13 | End: 2019-08-16 | Stop reason: SDUPTHER

## 2019-06-18 DIAGNOSIS — E55.9 VITAMIN D DEFICIENCY: ICD-10-CM

## 2019-06-18 RX ORDER — ERGOCALCIFEROL 1.25 MG/1
CAPSULE ORAL
Qty: 4 CAPSULE | Refills: 3 | Status: SHIPPED | OUTPATIENT
Start: 2019-06-18 | End: 2019-07-29 | Stop reason: SDUPTHER

## 2019-06-28 ENCOUNTER — OFFICE VISIT (OUTPATIENT)
Dept: ENDOCRINOLOGY | Facility: CLINIC | Age: 64
End: 2019-06-28

## 2019-06-28 VITALS
DIASTOLIC BLOOD PRESSURE: 72 MMHG | BODY MASS INDEX: 36.96 KG/M2 | HEART RATE: 69 BPM | SYSTOLIC BLOOD PRESSURE: 110 MMHG | OXYGEN SATURATION: 99 % | WEIGHT: 145 LBS

## 2019-06-28 DIAGNOSIS — I10 ESSENTIAL HYPERTENSION: ICD-10-CM

## 2019-06-28 DIAGNOSIS — G47.33 OSA (OBSTRUCTIVE SLEEP APNEA): ICD-10-CM

## 2019-06-28 DIAGNOSIS — IMO0001 UNCONTROLLED DIABETES MELLITUS TYPE 2 WITHOUT COMPLICATIONS: Primary | ICD-10-CM

## 2019-06-28 DIAGNOSIS — E89.0 POSTABLATIVE HYPOTHYROIDISM: ICD-10-CM

## 2019-06-28 DIAGNOSIS — E53.8 B12 DEFICIENCY: ICD-10-CM

## 2019-06-28 DIAGNOSIS — E27.1 ADDISON'S DISEASE (HCC): ICD-10-CM

## 2019-06-28 LAB
GLUCOSE BLDC GLUCOMTR-MCNC: 161 MG/DL (ref 70–130)
HBA1C MFR BLD: 6.4 %

## 2019-06-28 PROCEDURE — 83036 HEMOGLOBIN GLYCOSYLATED A1C: CPT | Performed by: INTERNAL MEDICINE

## 2019-06-28 PROCEDURE — 80053 COMPREHEN METABOLIC PANEL: CPT | Performed by: PHYSICIAN ASSISTANT

## 2019-06-28 PROCEDURE — 96372 THER/PROPH/DIAG INJ SC/IM: CPT | Performed by: INTERNAL MEDICINE

## 2019-06-28 PROCEDURE — 84443 ASSAY THYROID STIM HORMONE: CPT | Performed by: PHYSICIAN ASSISTANT

## 2019-06-28 PROCEDURE — 99215 OFFICE O/P EST HI 40 MIN: CPT | Performed by: INTERNAL MEDICINE

## 2019-06-28 PROCEDURE — 84439 ASSAY OF FREE THYROXINE: CPT | Performed by: PHYSICIAN ASSISTANT

## 2019-06-28 PROCEDURE — 82962 GLUCOSE BLOOD TEST: CPT | Performed by: INTERNAL MEDICINE

## 2019-06-28 RX ORDER — LEVOTHYROXINE SODIUM 0.05 MG/1
50 TABLET ORAL DAILY
Qty: 90 TABLET | Refills: 1 | Status: SHIPPED | OUTPATIENT
Start: 2019-06-28 | End: 2020-01-28 | Stop reason: SDUPTHER

## 2019-06-28 RX ORDER — LISINOPRIL 10 MG/1
10 TABLET ORAL DAILY
Qty: 90 TABLET | Refills: 1 | Status: SHIPPED | OUTPATIENT
Start: 2019-06-28 | End: 2019-09-06

## 2019-06-28 RX ORDER — SIMVASTATIN 20 MG
20 TABLET ORAL NIGHTLY
Qty: 90 TABLET | Refills: 1 | Status: SHIPPED | OUTPATIENT
Start: 2019-06-28 | End: 2019-07-16 | Stop reason: SDUPTHER

## 2019-06-28 RX ORDER — CYANOCOBALAMIN 1000 UG/ML
1000 INJECTION, SOLUTION INTRAMUSCULAR; SUBCUTANEOUS ONCE
Status: COMPLETED | OUTPATIENT
Start: 2019-06-28 | End: 2019-06-28

## 2019-06-28 RX ORDER — HYDROCORTISONE 5 MG/1
TABLET ORAL
Qty: 360 TABLET | Refills: 1 | Status: SHIPPED | OUTPATIENT
Start: 2019-06-28 | End: 2019-09-03

## 2019-06-28 RX ADMIN — CYANOCOBALAMIN 1000 MCG: 1000 INJECTION, SOLUTION INTRAMUSCULAR; SUBCUTANEOUS at 14:41

## 2019-06-28 NOTE — PROGRESS NOTES
"Chief complaint  Follow-up (DM 2)    Subjective   Karen Rubio is a 63 y.o. female is here today for follow-up.  Follow-up for hypothyroidism and Shawn's disease. HTN and Depression. Diabetes.   Adrenal insufficiency / Shawn's disease dx in 2004. She had significant hyperpigmentation and fatigue.   She was taking hydrocortisone and fludrocortisone. On presentation in Oct 2014 and following visit BP was elevated 180//100 and I have d/c fludrocortisone. BP stabilized.   Last visit she was only taking total of 15 mg hydrocortisone daily and had multiple sx suggestive of adrenal insufficiency, headache hypoglycemia and low BP> I have increased hydrocortisone to 15 mg/10 mg and sx resolved. Now she is concerned about weight gain and elevated BP. Still has fatigue, glucose is higher. Last visit 9/2018- AM 15mg, decreased to 5mg in PM.  BP good today.     Diabetes mellitus type 2   She stopped metformin because of diarrhea. On Januvia 100  Mg daily . No hypoglycemia. A1C 6.8 2/28/19    Hypothyroidism postablative. S/p I-131 therapy at age 14. She is taking levothyroxine 50 mcg.     Last seen 9/2018    Presents with .   C/o increased wt gain.  Tired all the time.  Has SIENA. CPAP \"makes me smother.\" She is open to seeing a sleep provider within [facility] for reassessment.   Overall no change in sx since last visit.   Wants her B12 shot today.        Diabetes   Hypoglycemia symptoms include headaches. Associated symptoms include polyuria.   Hypothyroidism   Associated symptoms include arthralgias, headaches and numbness. Pertinent negatives include no abdominal pain, chills, congestion, diaphoresis, joint swelling, myalgias, nausea, neck pain or vomiting.       Medications    Current Outpatient Medications:   •  albuterol (PROVENTIL HFA;VENTOLIN HFA) 108 (90 Base) MCG/ACT inhaler, Inhale 2 puffs Every 4 (Four) Hours As Needed for Wheezing., Disp: 3.7 g, Rfl: 6  •  ALPRAZolam (XANAX) 1 MG tablet, Take 1 " "tablet by mouth 4 (Four) Times a Day., Disp: 120 tablet, Rfl: 2  •  B-D INTEGRA SYRINGE 25G X 5/8\" 3 ML misc, USE AS DIRECTED EVERY 14 DAYS FOR B12 INJECTIONS, Disp: 4 each, Rfl: 0  •  cetirizine (zyrTEC) 10 MG tablet, TAKE 1 TABLET BY MOUTH EVERY DAY, Disp: 30 tablet, Rfl: 4  •  citalopram (CeleXA) 40 MG tablet, Take 1 tablet by mouth Daily., Disp: 30 tablet, Rfl: 11  •  cyanocobalamin 1000 MCG/ML injection, INJECT 1ML EVERY 2 WEEKS AS DIRECTED, Disp: 2 mL, Rfl: 3  •  fluticasone (FLONASE) 50 MCG/ACT nasal spray, PLACE 1 SPRAY IN EACH NOSTRIL EVERY DAY, Disp: 1 bottle, Rfl: 0  •  gabapentin (NEURONTIN) 400 MG capsule, Take 1 capsule by mouth 3 (Three) Times a Day., Disp: 90 capsule, Rfl: 1  •  glucose blood (ONE TOUCH ULTRA TEST) test strip, 1 each by Other route As Needed. 1-2 times a day, Disp: , Rfl:   •  hydrocortisone (CORTEF) 5 MG tablet, TAKE 3 TABLETS BY MOUTH EVERY DAY IN THE MORNING AND ALSO TAKE 1 TABLET EVERY EVENING, Disp: 360 tablet, Rfl: 1  •  ibuprofen (ADVIL,MOTRIN) 600 MG tablet, Take 1 tablet by mouth Every 6 (Six) Hours As Needed for Mild Pain ., Disp: 90 tablet, Rfl: 5  •  levothyroxine (SYNTHROID, LEVOTHROID) 50 MCG tablet, Take 1 tablet by mouth Daily., Disp: 90 tablet, Rfl: 1  •  lisinopril (PRINIVIL,ZESTRIL) 10 MG tablet, Take 1 tablet by mouth Daily., Disp: 90 tablet, Rfl: 1  •  montelukast (SINGULAIR) 10 MG tablet, TAKE 1 TABLET BY MOUTH ONCE DAILY AT NIGHT, Disp: 30 tablet, Rfl: 6  •  ondansetron ODT (ZOFRAN-ODT) 4 MG disintegrating tablet, Take  by mouth., Disp: , Rfl:   •  ONETOUCH DELICA LANCETS 33G misc, Checks x 1 / day, Disp: , Rfl:   •  oxyCODONE-acetaminophen (PERCOCET)  MG per tablet, Take 1 tablet by mouth Every 4 (Four) Hours As Needed for Moderate Pain ., Disp: 150 tablet, Rfl: 0  •  promethazine (PHENERGAN) 25 MG tablet, Take 1 tablet by mouth Every 6 (Six) Hours As Needed for Nausea or Vomiting., Disp: 20 tablet, Rfl: 1  •  simvastatin (ZOCOR) 20 MG tablet, Take 1 " tablet by mouth Every Night., Disp: 90 tablet, Rfl: 1  •  SITagliptin (JANUVIA) 100 MG tablet, Take 1 tablet by mouth Daily., Disp: 90 tablet, Rfl: 1  •  vitamin D (ERGOCALCIFEROL) 10372 units capsule capsule, TAKE ONE CAPSULE BY MOUTH EVERY 7 DAYS, Disp: 4 capsule, Rfl: 3  •  diclofenac (VOLTAREN) 75 MG EC tablet, Take 75 mg by mouth 2 (Two) Times a Day., Disp: , Rfl: 0  •  tolterodine (DETROL) 2 MG tablet, Take 2 mg by mouth., Disp: , Rfl:   •  zolpidem (AMBIEN) 5 MG tablet, TK 1 T PO HS, Disp: , Rfl: 1    Current Facility-Administered Medications:   •  cyanocobalamin injection 1,000 mcg, 1,000 mcg, Intramuscular, Once, Becky Butler PA-C    [facility]  The following portions of the patient's history were reviewed and updated as appropriate: allergies, current medications, past family history, past medical history, past social history, past surgical history and problem list.    Review of systems  Review of Systems   Constitutional: Positive for appetite change (improved appetite. ) and unexpected weight change (weight gain). Negative for activity change, chills and diaphoresis.   HENT: Negative for congestion, ear pain, facial swelling, hearing loss, trouble swallowing and voice change.    Eyes: Positive for visual disturbance. Negative for redness and itching.   Cardiovascular: Negative for palpitations and leg swelling.   Gastrointestinal: Negative for abdominal distention, abdominal pain, constipation, nausea and vomiting.   Endocrine: Positive for polyuria.        As listed in HPI   Genitourinary: Negative.    Musculoskeletal: Positive for arthralgias and back pain (radiating to the left leg. ). Negative for joint swelling, myalgias, neck pain and neck stiffness.   Skin: Negative.    Allergic/Immunologic: Negative.    Neurological: Positive for light-headedness, numbness and headaches. Negative for syncope.   Hematological: Negative.    Psychiatric/Behavioral: Positive for decreased concentration (memory loss)  and sleep disturbance.   All other systems reviewed and are negative.      Physical exam  Objective   Blood pressure 110/72, pulse 69, weight 65.8 kg (145 lb), SpO2 99 %, not currently breastfeeding.   Physical Exam   Constitutional: She is oriented to person, place, and time. She appears well-developed and well-nourished.   HENT:   Head: Normocephalic and atraumatic.   Mouth/Throat: Mucous membranes are normal.   Eyes: Conjunctivae are normal.   Neck: No JVD present. No thyromegaly present.   The neck is supple and symmetric   Cardiovascular: Normal rate, regular rhythm and normal heart sounds.   Pulmonary/Chest: Effort normal and breath sounds normal.   Musculoskeletal: Normal range of motion. She exhibits deformity (deformity of the feet noted bilaterally). She exhibits no edema.      Foot Structure and Biomechanics -  Her right foot exhibits hammer toes.  Her left foot exhibits hammer toes.  Lymphadenopathy:     She has no cervical adenopathy.   Neurological: She is alert and oriented to person, place, and time. She has normal reflexes.   Skin: Skin is warm and dry. No rash noted.   Psychiatric: She has a normal mood and affect. Thought content normal.   Vitals reviewed.        LABS AND IMAGING  Office Visit on 06/28/2019   Component Date Value Ref Range Status   • Glucose 06/28/2019 161* 70 - 130 mg/dL Final   • Hemoglobin A1C 06/28/2019 6.4  % Final           Assessment  Assessment/Plan   1. 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 Postablative hypothyroidism    4. SIENA (obstructive sleep apnea)    5. B12 deficiency    6. Essential hypertension      Orders Placed This Encounter   Procedures   • TSH   • T4, Free   • Comprehensive Metabolic Panel   • Ambulatory Referral to Sleep Medicine   • POC Glucose Fingerstick   • POC Glycosylated Hemoglobin (Hb A1C)     Plan    -Hydrocortisone dose: 15 mg in am and 5 mg in pm.  The general approach is to  provide lowest dose which controls adrenal insufficiency  -BP good today- continue lisinopril 10 mg daily  -diabetes is controlled - A1C 6.4  -continue Januvia 100 mg daily   -continue Levothyroxine 50 mcg a day.  -meds refilled  -B12 shot today  -referral to sleep medicine. Suspect she would have less fatigue if SIENA is treated appropriately  -labs as above    Follow-up in 3 months with Dr. Morales    22 min  of 40 min face-to-face visit time spent for coordination of care and counselling regarding identified problems as outlined in the objective, assessment and discussion portions of the documentation.

## 2019-06-29 ENCOUNTER — TELEPHONE (OUTPATIENT)
Dept: INTERNAL MEDICINE | Facility: CLINIC | Age: 64
End: 2019-06-29

## 2019-06-29 ENCOUNTER — HOSPITAL ENCOUNTER (EMERGENCY)
Facility: HOSPITAL | Age: 64
Discharge: HOME | End: 2019-06-30
Attending: EMERGENCY MEDICINE | Admitting: EMERGENCY MEDICINE

## 2019-06-29 DIAGNOSIS — E83.42 HYPOMAGNESEMIA: ICD-10-CM

## 2019-06-29 DIAGNOSIS — E87.5 HYPERKALEMIA: Primary | ICD-10-CM

## 2019-06-29 LAB
ALBUMIN SERPL-MCNC: 4.2 G/DL (ref 3.5–5.2)
ALBUMIN/GLOB SERPL: 1.7 G/DL
ALP SERPL-CCNC: 71 U/L (ref 39–117)
ALT SERPL W P-5'-P-CCNC: 15 U/L (ref 1–33)
ANION GAP SERPL CALCULATED.3IONS-SCNC: 10.5 MMOL/L (ref 5–15)
AST SERPL-CCNC: 21 U/L (ref 1–32)
BILIRUB SERPL-MCNC: 0.5 MG/DL (ref 0.2–1.2)
BUN BLD-MCNC: 11 MG/DL (ref 8–23)
BUN/CREAT SERPL: 9.9 (ref 7–25)
CALCIUM SPEC-SCNC: 10.1 MG/DL (ref 8.6–10.5)
CHLORIDE SERPL-SCNC: 103 MMOL/L (ref 98–107)
CO2 SERPL-SCNC: 25.5 MMOL/L (ref 22–29)
CREAT BLD-MCNC: 1.11 MG/DL (ref 0.57–1)
GFR SERPL CREATININE-BSD FRML MDRD: 49 ML/MIN/1.73
GLOBULIN UR ELPH-MCNC: 2.5 GM/DL
GLUCOSE BLD-MCNC: 136 MG/DL (ref 65–99)
POTASSIUM BLD-SCNC: 7.1 MMOL/L (ref 3.5–5.2)
PROT SERPL-MCNC: 6.7 G/DL (ref 6–8.5)
SODIUM BLD-SCNC: 139 MMOL/L (ref 136–145)
T4 FREE SERPL-MCNC: 0.96 NG/DL (ref 0.93–1.7)
TSH SERPL DL<=0.05 MIU/L-ACNC: 1.83 MIU/ML (ref 0.27–4.2)

## 2019-06-29 PROCEDURE — 83735 ASSAY OF MAGNESIUM: CPT | Performed by: EMERGENCY MEDICINE

## 2019-06-29 PROCEDURE — 93005 ELECTROCARDIOGRAM TRACING: CPT | Performed by: EMERGENCY MEDICINE

## 2019-06-29 PROCEDURE — 85025 COMPLETE CBC W/AUTO DIFF WBC: CPT | Performed by: EMERGENCY MEDICINE

## 2019-06-29 PROCEDURE — 99284 EMERGENCY DEPT VISIT MOD MDM: CPT

## 2019-06-29 PROCEDURE — 94640 AIRWAY INHALATION TREATMENT: CPT

## 2019-06-29 PROCEDURE — 81001 URINALYSIS AUTO W/SCOPE: CPT | Performed by: EMERGENCY MEDICINE

## 2019-06-29 PROCEDURE — 80053 COMPREHEN METABOLIC PANEL: CPT | Performed by: EMERGENCY MEDICINE

## 2019-06-29 PROCEDURE — 36415 COLL VENOUS BLD VENIPUNCTURE: CPT

## 2019-06-29 NOTE — TELEPHONE ENCOUNTER
I called LM on mobile number and home number not in service.  Need to speak to pt when she returns call regarding lab value.

## 2019-06-29 NOTE — PROGRESS NOTES
I have reviewed the notes, assessments, and/or procedures performed by Becky Butler PA-C, I concur with her/his documentation of Karen Rubio

## 2019-06-30 VITALS
RESPIRATION RATE: 16 BRPM | SYSTOLIC BLOOD PRESSURE: 147 MMHG | HEART RATE: 99 BPM | DIASTOLIC BLOOD PRESSURE: 82 MMHG | HEIGHT: 55 IN | WEIGHT: 145 LBS | OXYGEN SATURATION: 95 % | TEMPERATURE: 98.6 F | BODY MASS INDEX: 33.56 KG/M2

## 2019-06-30 LAB
ALBUMIN SERPL-MCNC: 4 G/DL (ref 3.5–5.2)
ALBUMIN/GLOB SERPL: 1.6 G/DL
ALP SERPL-CCNC: 69 U/L (ref 39–117)
ALT SERPL W P-5'-P-CCNC: 12 U/L (ref 1–33)
ANION GAP SERPL CALCULATED.3IONS-SCNC: 10 MMOL/L (ref 5–15)
AST SERPL-CCNC: 20 U/L (ref 1–32)
BACTERIA UR QL AUTO: NORMAL /HPF
BASOPHILS # BLD AUTO: 0.05 10*3/MM3 (ref 0–0.2)
BASOPHILS NFR BLD AUTO: 0.6 % (ref 0–1.5)
BILIRUB SERPL-MCNC: 0.5 MG/DL (ref 0.2–1.2)
BILIRUB UR QL STRIP: NEGATIVE
BUN BLD-MCNC: 14 MG/DL (ref 8–23)
BUN BLDA-MCNC: 15 MG/DL (ref 8–26)
BUN/CREAT SERPL: 16.3 (ref 7–25)
CA-I BLDA-SCNC: 1.25 MMOL/L (ref 1.2–1.32)
CALCIUM SPEC-SCNC: 9.4 MG/DL (ref 8.6–10.5)
CHLORIDE BLDA-SCNC: 98 MMOL/L (ref 98–109)
CHLORIDE SERPL-SCNC: 99 MMOL/L (ref 98–107)
CLARITY UR: CLEAR
CO2 BLDA-SCNC: 24 MMOL/L (ref 24–29)
CO2 SERPL-SCNC: 24 MMOL/L (ref 22–29)
COLOR UR: YELLOW
CREAT BLD-MCNC: 0.86 MG/DL (ref 0.57–1)
CREAT BLDA-MCNC: 0.9 MG/DL (ref 0.6–1.3)
DEPRECATED RDW RBC AUTO: 37.5 FL (ref 37–54)
EOSINOPHIL # BLD AUTO: 0.12 10*3/MM3 (ref 0–0.4)
EOSINOPHIL NFR BLD AUTO: 1.3 % (ref 0.3–6.2)
ERYTHROCYTE [DISTWIDTH] IN BLOOD BY AUTOMATED COUNT: 11.7 % (ref 12.3–15.4)
GFR SERPL CREATININE-BSD FRML MDRD: 66 ML/MIN/1.73
GLOBULIN UR ELPH-MCNC: 2.5 GM/DL
GLUCOSE BLD-MCNC: 124 MG/DL (ref 65–99)
GLUCOSE BLDC GLUCOMTR-MCNC: 183 MG/DL (ref 70–130)
GLUCOSE UR STRIP-MCNC: NEGATIVE MG/DL
HCT VFR BLD AUTO: 35.1 % (ref 34–46.6)
HCT VFR BLDA CALC: 35 % (ref 38–51)
HGB BLD-MCNC: 11.6 G/DL (ref 12–15.9)
HGB BLDA-MCNC: 11.9 G/DL (ref 12–17)
HGB UR QL STRIP.AUTO: NEGATIVE
HYALINE CASTS UR QL AUTO: NORMAL /LPF
IMM GRANULOCYTES # BLD AUTO: 0.08 10*3/MM3 (ref 0–0.05)
IMM GRANULOCYTES NFR BLD AUTO: 0.9 % (ref 0–0.5)
KETONES UR QL STRIP: NEGATIVE
LEUKOCYTE ESTERASE UR QL STRIP.AUTO: ABNORMAL
LYMPHOCYTES # BLD AUTO: 1.92 10*3/MM3 (ref 0.7–3.1)
LYMPHOCYTES NFR BLD AUTO: 21.5 % (ref 19.6–45.3)
MAGNESIUM SERPL-MCNC: 1.5 MG/DL (ref 1.6–2.4)
MCH RBC QN AUTO: 28.9 PG (ref 26.6–33)
MCHC RBC AUTO-ENTMCNC: 33 G/DL (ref 31.5–35.7)
MCV RBC AUTO: 87.3 FL (ref 79–97)
MONOCYTES # BLD AUTO: 0.82 10*3/MM3 (ref 0.1–0.9)
MONOCYTES NFR BLD AUTO: 9.2 % (ref 5–12)
NEUTROPHILS # BLD AUTO: 5.96 10*3/MM3 (ref 1.7–7)
NEUTROPHILS NFR BLD AUTO: 66.5 % (ref 42.7–76)
NITRITE UR QL STRIP: NEGATIVE
NRBC BLD AUTO-RTO: 0 /100 WBC (ref 0–0.2)
PH UR STRIP.AUTO: 6.5 [PH] (ref 5–8)
PLATELET # BLD AUTO: 184 10*3/MM3 (ref 140–450)
PMV BLD AUTO: 9.5 FL (ref 6–12)
POTASSIUM BLD-SCNC: 5.5 MMOL/L (ref 3.5–5.2)
POTASSIUM BLDA-SCNC: 4.1 MMOL/L (ref 3.5–4.9)
PROT SERPL-MCNC: 6.5 G/DL (ref 6–8.5)
PROT UR QL STRIP: NEGATIVE
RBC # BLD AUTO: 4.02 10*6/MM3 (ref 3.77–5.28)
RBC # UR: NORMAL /HPF
REF LAB TEST METHOD: NORMAL
SODIUM BLD-SCNC: 133 MMOL/L (ref 136–145)
SODIUM BLDA-SCNC: 135 MMOL/L (ref 138–146)
SP GR UR STRIP: 1.01 (ref 1–1.03)
SQUAMOUS #/AREA URNS HPF: NORMAL /HPF
UROBILINOGEN UR QL STRIP: ABNORMAL
WBC NRBC COR # BLD: 8.95 10*3/MM3 (ref 3.4–10.8)
WBC UR QL AUTO: NORMAL /HPF

## 2019-06-30 PROCEDURE — 96374 THER/PROPH/DIAG INJ IV PUSH: CPT

## 2019-06-30 PROCEDURE — 80047 BASIC METABLC PNL IONIZED CA: CPT

## 2019-06-30 PROCEDURE — 63710000001 INSULIN REGULAR HUMAN PER 5 UNITS: Performed by: EMERGENCY MEDICINE

## 2019-06-30 PROCEDURE — 94799 UNLISTED PULMONARY SVC/PX: CPT

## 2019-06-30 PROCEDURE — 85014 HEMATOCRIT: CPT

## 2019-06-30 RX ORDER — ALBUTEROL SULFATE 2.5 MG/3ML
10 SOLUTION RESPIRATORY (INHALATION) ONCE
Status: COMPLETED | OUTPATIENT
Start: 2019-06-30 | End: 2019-06-30

## 2019-06-30 RX ORDER — DEXTROSE MONOHYDRATE 25 G/50ML
50 INJECTION, SOLUTION INTRAVENOUS ONCE
Status: COMPLETED | OUTPATIENT
Start: 2019-06-30 | End: 2019-06-30

## 2019-06-30 RX ADMIN — ALBUTEROL SULFATE 10 MG: 2.5 SOLUTION RESPIRATORY (INHALATION) at 00:55

## 2019-06-30 RX ADMIN — DEXTROSE 50 % IN WATER (D50W) INTRAVENOUS SYRINGE 50 ML: at 02:58

## 2019-06-30 RX ADMIN — INSULIN HUMAN 5 UNITS: 100 INJECTION, SOLUTION PARENTERAL at 02:46

## 2019-06-30 RX ADMIN — Medication 400 MG: at 04:00

## 2019-07-03 ENCOUNTER — TELEPHONE (OUTPATIENT)
Dept: INTERNAL MEDICINE | Facility: CLINIC | Age: 64
End: 2019-07-03

## 2019-07-03 RX ORDER — CETIRIZINE HYDROCHLORIDE 10 MG/1
TABLET ORAL
Qty: 30 TABLET | Refills: 4 | Status: SHIPPED | OUTPATIENT
Start: 2019-07-03 | End: 2019-07-16 | Stop reason: SDUPTHER

## 2019-07-03 NOTE — TELEPHONE ENCOUNTER
PLEASE CONTACT PATIENT TO MAKE AN APPOINTMENT WITH THOMAS. SHE STATES THAT THE DOCTOR FROM [facility] NEEDS HER TO HAVE BLOOD DRAWN BUT SHES NOT SURE FOR WHAT. SHE WOULD LIKE TO SEE THOMAS. [phone]

## 2019-07-03 NOTE — TELEPHONE ENCOUNTER
PLEASE CALL PT SHE SAYS SHE NEEDS TO HAVE MORE BLOOD WORK DONE THIS WEEK-CALL HER AND SHE CAN EXPLAIN WHAT SHE NEEDS  PTS NUMBER [id number]

## 2019-07-05 NOTE — TELEPHONE ENCOUNTER
Returned patient call, she stated she went to the ER and they told her her Mag and potassium was off and her BS was up.  They gave her Insulin through IV. They stated she needs t have ;abs done weekly and to see her Endocrinologist more often to check this.  She recently saw Becky on June 28th.  Please advise on any labs you would like for her to have and if she should have a standing order for them. I will ad her to the cancellation list.

## 2019-07-05 NOTE — TELEPHONE ENCOUNTER
Lvm for patient to return my call to discuss what labs she is needing and an appt with Dr. Morales.

## 2019-07-08 ENCOUNTER — LAB (OUTPATIENT)
Dept: LAB | Facility: HOSPITAL | Age: 64
End: 2019-07-08

## 2019-07-08 DIAGNOSIS — E87.5 HYPERKALEMIA: Primary | ICD-10-CM

## 2019-07-08 DIAGNOSIS — E87.5 HYPERKALEMIA: ICD-10-CM

## 2019-07-08 LAB
ANION GAP SERPL CALCULATED.3IONS-SCNC: 13.1 MMOL/L (ref 5–15)
BUN BLD-MCNC: 11 MG/DL (ref 8–23)
BUN/CREAT SERPL: 10.7 (ref 7–25)
CALCIUM SPEC-SCNC: 8.9 MG/DL (ref 8.6–10.5)
CHLORIDE SERPL-SCNC: 100 MMOL/L (ref 98–107)
CO2 SERPL-SCNC: 21.9 MMOL/L (ref 22–29)
CREAT BLD-MCNC: 1.03 MG/DL (ref 0.57–1)
GFR SERPL CREATININE-BSD FRML MDRD: 54 ML/MIN/1.73
GLUCOSE BLD-MCNC: 185 MG/DL (ref 65–99)
POTASSIUM BLD-SCNC: 5.7 MMOL/L (ref 3.5–5.2)
SODIUM BLD-SCNC: 135 MMOL/L (ref 136–145)

## 2019-07-08 PROCEDURE — 80048 BASIC METABOLIC PNL TOTAL CA: CPT

## 2019-07-09 DIAGNOSIS — E87.5 HYPERKALEMIA: Primary | ICD-10-CM

## 2019-07-16 ENCOUNTER — OFFICE VISIT (OUTPATIENT)
Dept: FAMILY MEDICINE CLINIC | Facility: CLINIC | Age: 64
End: 2019-07-16

## 2019-07-16 ENCOUNTER — LAB (OUTPATIENT)
Dept: LAB | Facility: HOSPITAL | Age: 64
End: 2019-07-16

## 2019-07-16 VITALS
HEIGHT: 55 IN | BODY MASS INDEX: 32.95 KG/M2 | TEMPERATURE: 98.2 F | DIASTOLIC BLOOD PRESSURE: 78 MMHG | OXYGEN SATURATION: 96 % | RESPIRATION RATE: 20 BRPM | WEIGHT: 142.4 LBS | HEART RATE: 75 BPM | SYSTOLIC BLOOD PRESSURE: 122 MMHG

## 2019-07-16 DIAGNOSIS — F32.A DEPRESSION, UNSPECIFIED DEPRESSION TYPE: ICD-10-CM

## 2019-07-16 DIAGNOSIS — I10 ESSENTIAL HYPERTENSION: ICD-10-CM

## 2019-07-16 DIAGNOSIS — E87.5 HYPERKALEMIA: Primary | ICD-10-CM

## 2019-07-16 DIAGNOSIS — F41.9 ANXIETY: ICD-10-CM

## 2019-07-16 DIAGNOSIS — R79.0 LOW MAGNESIUM LEVEL: ICD-10-CM

## 2019-07-16 DIAGNOSIS — J30.2 SEASONAL ALLERGIC RHINITIS, UNSPECIFIED TRIGGER: ICD-10-CM

## 2019-07-16 DIAGNOSIS — J30.2 SEASONAL ALLERGIC RHINITIS: ICD-10-CM

## 2019-07-16 DIAGNOSIS — E11.9 TYPE 2 DIABETES MELLITUS WITHOUT COMPLICATION, WITHOUT LONG-TERM CURRENT USE OF INSULIN (HCC): ICD-10-CM

## 2019-07-16 DIAGNOSIS — E27.1 ADDISON'S DISEASE (HCC): ICD-10-CM

## 2019-07-16 LAB
ANION GAP SERPL CALCULATED.3IONS-SCNC: 12.1 MMOL/L (ref 5–15)
BUN BLD-MCNC: 12 MG/DL (ref 8–23)
BUN/CREAT SERPL: 13.2 (ref 7–25)
CALCIUM SPEC-SCNC: 10.2 MG/DL (ref 8.6–10.5)
CHLORIDE SERPL-SCNC: 101 MMOL/L (ref 98–107)
CO2 SERPL-SCNC: 25.9 MMOL/L (ref 22–29)
CREAT BLD-MCNC: 0.91 MG/DL (ref 0.57–1)
GFR SERPL CREATININE-BSD FRML MDRD: 62 ML/MIN/1.73
GLUCOSE BLD-MCNC: 91 MG/DL (ref 65–99)
MAGNESIUM SERPL-MCNC: 1.6 MG/DL (ref 1.6–2.4)
POTASSIUM BLD-SCNC: 5.1 MMOL/L (ref 3.5–5.2)
SODIUM BLD-SCNC: 139 MMOL/L (ref 136–145)

## 2019-07-16 PROCEDURE — 36415 COLL VENOUS BLD VENIPUNCTURE: CPT

## 2019-07-16 PROCEDURE — 80048 BASIC METABOLIC PNL TOTAL CA: CPT

## 2019-07-16 PROCEDURE — 83735 ASSAY OF MAGNESIUM: CPT

## 2019-07-16 PROCEDURE — 99214 OFFICE O/P EST MOD 30 MIN: CPT | Performed by: FAMILY MEDICINE

## 2019-07-16 RX ORDER — LISINOPRIL 10 MG/1
10 TABLET ORAL DAILY
Qty: 90 TABLET | Refills: 1 | Status: CANCELLED | OUTPATIENT
Start: 2019-07-16

## 2019-07-16 RX ORDER — CETIRIZINE HYDROCHLORIDE 10 MG/1
10 TABLET ORAL DAILY
Qty: 30 TABLET | Refills: 11 | Status: SHIPPED | OUTPATIENT
Start: 2019-07-16 | End: 2020-05-22

## 2019-07-16 RX ORDER — CITALOPRAM 40 MG/1
40 TABLET ORAL DAILY
Qty: 30 TABLET | Refills: 11 | Status: SHIPPED | OUTPATIENT
Start: 2019-07-16 | End: 2020-01-28

## 2019-07-16 RX ORDER — SIMVASTATIN 20 MG
20 TABLET ORAL NIGHTLY
Qty: 90 TABLET | Refills: 3 | Status: SHIPPED | OUTPATIENT
Start: 2019-07-16 | End: 2020-07-23 | Stop reason: SDUPTHER

## 2019-07-16 RX ORDER — ALPRAZOLAM 1 MG/1
1 TABLET ORAL 4 TIMES DAILY
Qty: 120 TABLET | Refills: 3 | Status: SHIPPED | OUTPATIENT
Start: 2019-07-16 | End: 2020-04-20 | Stop reason: SDUPTHER

## 2019-07-16 RX ORDER — ALBUTEROL SULFATE 90 UG/1
2 AEROSOL, METERED RESPIRATORY (INHALATION) EVERY 4 HOURS PRN
Qty: 3.7 G | Refills: 6 | Status: SHIPPED | OUTPATIENT
Start: 2019-07-16 | End: 2019-11-01 | Stop reason: SDUPTHER

## 2019-07-16 NOTE — PROGRESS NOTES
Subjective   Karen Rubio is a 63 y.o. female    Chief Complaint    Emergency room follow-up  High potassium  Low magnesium  Diabetes mellitus    History of Present Illness  Patient presents today for emergency room follow-up visit.  She presented with fatigue and paresthesias.  She was noted to be hyperkalemic as well as low and magnesium.  She was treated appropriately in the emergency room and released for close follow-up.  She was advised to cut her lisinopril in half.  She says she has not been taking it at all when questioned however when I ask about her blood pressure medicine she says she has been taking it as prescribed.  She is accompanied today by her  but there is some confusing with her medications.  At any rate she is due for follow-up lab studies to check her potassium and magnesium levels.  Other medical problems include her diabetes mellitus, depression, allergic rhinitis and Shawn's disease.  She is followed closely by endocrinology and is on appropriate therapy.  She is requesting refills on multiple medications today.  This includes her antianxiety medication, alprazolam.    The following portions of the patient's history were reviewed and updated as appropriate: allergies, current medications, past social history and problem list    Review of Systems   Constitutional: Negative for appetite change, chills, diaphoresis, fatigue, fever and unexpected weight change.   HENT: Positive for congestion, postnasal drip and rhinorrhea. Negative for trouble swallowing and voice change.    Eyes: Negative for visual disturbance.   Respiratory: Positive for shortness of breath. Negative for cough, chest tightness and wheezing.    Cardiovascular: Negative for chest pain, palpitations and leg swelling.   Gastrointestinal: Negative for abdominal pain, diarrhea, nausea and vomiting.   Endocrine: Negative for polydipsia, polyphagia and polyuria.   Genitourinary: Negative for dysuria, frequency and  urgency.   Musculoskeletal: Positive for arthralgias and myalgias. Negative for back pain.   Skin: Negative for color change and rash.   Neurological: Positive for dizziness, weakness and numbness. Negative for seizures, syncope, light-headedness and headaches.   Hematological: Negative for adenopathy. Does not bruise/bleed easily.   Psychiatric/Behavioral: Positive for confusion, dysphoric mood and sleep disturbance. The patient is nervous/anxious.        Objective     Vitals:    07/16/19 0818   BP: 122/78   Pulse: 75   Resp: 20   Temp: 98.2 °F (36.8 °C)   SpO2: 96%       Physical Exam   Constitutional: She is oriented to person, place, and time. She appears well-developed and well-nourished.   HENT:   Head: Normocephalic.   Mouth/Throat: Oropharynx is clear and moist.   Eyes: Conjunctivae are normal. Pupils are equal, round, and reactive to light.   Neck: Neck supple. No JVD present. No thyromegaly present.   Cardiovascular: Normal rate, regular rhythm, normal heart sounds, intact distal pulses and normal pulses.   No murmur heard.  Pulmonary/Chest: Effort normal and breath sounds normal. No respiratory distress.   Abdominal: Soft. Bowel sounds are normal. There is no hepatosplenomegaly. There is no tenderness.   Musculoskeletal: She exhibits no edema.   Lymphadenopathy:     She has no cervical adenopathy.   Neurological: She is alert and oriented to person, place, and time. No sensory deficit.   Skin: Skin is warm and dry. No rash noted. She is not diaphoretic.   Psychiatric: She has a normal mood and affect. Her behavior is normal.   Nursing note and vitals reviewed.      Assessment/Plan   Problem List Items Addressed This Visit        Cardiovascular and Mediastinum    Hypertension    Relevant Orders    Basic Metabolic Panel    Magnesium       Respiratory    Allergic rhinitis    Relevant Medications    cetirizine (zyrTEC) 10 MG tablet       Endocrine    Shawn's disease (CMS/HCC)       Other    Anxiety     Relevant Medications    ALPRAZolam (XANAX) 1 MG tablet    citalopram (CeleXA) 40 MG tablet      Other Visit Diagnoses     Hyperkalemia    -  Primary    Seasonal allergic rhinitis        Relevant Medications    albuterol sulfate  (90 Base) MCG/ACT inhaler    Type 2 diabetes mellitus without complication, without long-term current use of insulin (CMS/[facility])        Low magnesium level        Depression, unspecified depression type        Relevant Medications    ALPRAZolam (XANAX) 1 MG tablet    citalopram (CeleXA) 40 MG tablet

## 2019-07-19 ENCOUNTER — TELEPHONE (OUTPATIENT)
Dept: INTERNAL MEDICINE | Facility: CLINIC | Age: 64
End: 2019-07-19

## 2019-07-19 ENCOUNTER — TELEPHONE (OUTPATIENT)
Dept: FAMILY MEDICINE CLINIC | Facility: CLINIC | Age: 64
End: 2019-07-19

## 2019-07-19 RX ORDER — METHYLPREDNISOLONE 4 MG/1
TABLET ORAL
Qty: 21 TABLET | Refills: 0 | Status: SHIPPED | OUTPATIENT
Start: 2019-07-19 | End: 2019-09-06

## 2019-07-19 RX ORDER — MONTELUKAST SODIUM 10 MG/1
10 TABLET ORAL DAILY
Qty: 30 TABLET | Refills: 2 | Status: SHIPPED | OUTPATIENT
Start: 2019-07-19 | End: 2019-11-01 | Stop reason: SDUPTHER

## 2019-07-19 NOTE — TELEPHONE ENCOUNTER
----- Message from Princess Jeffrey sent at 7/19/2019  1:26 PM EDT -----  Contact: PATIENT  SHE HAD ALBUTEROL REFILLED A FEW SAYS AGO AND SHE SAID SHE FEELS LIKE IT IS NOT HELPING HER AT ALL. WANTED TO KNOW IF SOMETHING DIFFERENT CAN BE CALLED IN FOR HER OR ANY SUGGESTIONS, HAVING HARD TIME BREATHING. PLEASE CALL HER BACK ASAP. ALSO WANTED TO KNOW IF ANY OF HER RECENT TESTS ERE BACK AND IF SHE CAN GET THE RESULTS;  [phone] (SAID LET IT RING SEVERAL TIMES BECAUSE IF SHE IS IN THE BACK OF THE HOUSE AND WILL TAKE HER A LITTLE BIT TO GET TO THE PHONE)    [facility]/pharmacy #[id number] - [place], [place] - [address] - [phone]  - [phone] (Phone)  [phone] (Fax)

## 2019-07-19 NOTE — TELEPHONE ENCOUNTER
Singulair 10 mg tablets, #30, 1 tablet daily, 2 refillsPotassium and magnesium levels were in the normal range on her lab testing.  Nothing further needs to be done regarding this.

## 2019-07-29 DIAGNOSIS — E55.9 VITAMIN D DEFICIENCY: ICD-10-CM

## 2019-07-29 RX ORDER — ERGOCALCIFEROL 1.25 MG/1
CAPSULE ORAL
Qty: 4 CAPSULE | Refills: 3 | Status: SHIPPED | OUTPATIENT
Start: 2019-07-29 | End: 2019-10-30 | Stop reason: SDUPTHER

## 2019-08-01 RX ORDER — SYRINGE WITH NEEDLE, 1 ML 25GX5/8"
SYRINGE, EMPTY DISPOSABLE MISCELLANEOUS
Qty: 4 EACH | Refills: 0 | Status: ON HOLD | OUTPATIENT
Start: 2019-08-01 | End: 2019-12-01

## 2019-08-16 RX ORDER — FLUTICASONE PROPIONATE 50 MCG
SPRAY, SUSPENSION (ML) NASAL
Qty: 16 ML | Refills: 0 | Status: SHIPPED | OUTPATIENT
Start: 2019-08-16 | End: 2019-11-13 | Stop reason: SDUPTHER

## 2019-08-26 ENCOUNTER — TELEPHONE (OUTPATIENT)
Dept: INTERNAL MEDICINE | Facility: CLINIC | Age: 64
End: 2019-08-26

## 2019-08-26 NOTE — TELEPHONE ENCOUNTER
Patient urgently requests a return call due to her need to be seen by Dr. Morales. Please return call and advise asap.

## 2019-08-29 ENCOUNTER — TELEPHONE (OUTPATIENT)
Dept: ORTHOPEDIC SURGERY | Facility: CLINIC | Age: 64
End: 2019-08-29

## 2019-08-29 NOTE — TELEPHONE ENCOUNTER
----- Message -----   From: Suma Charles   Sent: 8/19/2019   3:35 PM   To: MD Dr. Felton Cohn,     This patient just showed up at our front window (had an appt upstairs with Dr. Kelly).  She needs to see you and asked if you would see her sooner than later.     Wanted your input on when to schedule this patient.     Thank you!  Nesha Ya asked that I call patient back and let them know a follow up is needed.  I left a message on 8/20.     Patient's spouse returned call this afternoon - Bubba - said he would let patient know and they would call back to get an appointment.

## 2019-08-30 ENCOUNTER — OFFICE VISIT (OUTPATIENT)
Dept: FAMILY MEDICINE CLINIC | Facility: CLINIC | Age: 64
End: 2019-08-30

## 2019-08-30 VITALS
OXYGEN SATURATION: 99 % | HEART RATE: 71 BPM | WEIGHT: 143 LBS | TEMPERATURE: 97.1 F | SYSTOLIC BLOOD PRESSURE: 102 MMHG | HEIGHT: 55 IN | DIASTOLIC BLOOD PRESSURE: 70 MMHG | BODY MASS INDEX: 33.09 KG/M2

## 2019-08-30 DIAGNOSIS — R53.83 FATIGUE, UNSPECIFIED TYPE: ICD-10-CM

## 2019-08-30 DIAGNOSIS — I10 ESSENTIAL HYPERTENSION: Primary | ICD-10-CM

## 2019-08-30 DIAGNOSIS — R79.0 LOW MAGNESIUM LEVEL: ICD-10-CM

## 2019-08-30 DIAGNOSIS — E27.1 ADDISON'S DISEASE (HCC): ICD-10-CM

## 2019-08-30 DIAGNOSIS — E87.5 HYPERKALEMIA: ICD-10-CM

## 2019-08-30 PROCEDURE — 99213 OFFICE O/P EST LOW 20 MIN: CPT | Performed by: FAMILY MEDICINE

## 2019-08-30 RX ORDER — DICLOFENAC SODIUM 75 MG/1
TABLET, DELAYED RELEASE ORAL
COMMUNITY
Start: 2019-07-29 | End: 2019-11-01

## 2019-08-30 RX ORDER — GABAPENTIN 300 MG/1
300 CAPSULE ORAL EVERY 8 HOURS
COMMUNITY
Start: 2019-06-29 | End: 2021-01-05

## 2019-08-30 NOTE — TELEPHONE ENCOUNTER
Returned patient call, she stated she was very sick and needs to be seen.  Please place patient on schedue September 3 at 8:15.  There was a 15 minute slot

## 2019-09-02 NOTE — PROGRESS NOTES
Subjective   Karen Rubio is a 63 y.o. female    Chief Complaint    Elevated potassium  Low magnesium  Hypertension  Chronic fatigue    History of Present Illness  Patient presents today for a follow-up visit regarding electrolyte abnormalities and concerns regarding blood pressure.  She has ongoing chronic fatigue that is unchanged.  Her blood pressure has been low so she has not been taking her lisinopril.  She is asking my advice on what she should do with this and I advised her that as long as her blood pressure remain this low that she should not take it.  If her blood pressure begins to elevate we may need to restart at a lower dose.  She is due for follow-up lab testing due to the low magnesium and high potassium at her recent hospitalization.    The following portions of the patient's history were reviewed and updated as appropriate: allergies, current medications, past social history and problem list    Review of Systems   Constitutional: Negative for appetite change, chills, diaphoresis, fatigue, fever and unexpected weight change.   HENT: Positive for congestion, postnasal drip and rhinorrhea. Negative for trouble swallowing and voice change.    Eyes: Negative for visual disturbance.   Respiratory: Positive for shortness of breath. Negative for cough, chest tightness and wheezing.    Cardiovascular: Negative for chest pain, palpitations and leg swelling.   Gastrointestinal: Negative for abdominal pain, diarrhea, nausea and vomiting.   Endocrine: Negative for polydipsia, polyphagia and polyuria.   Genitourinary: Negative for dysuria, frequency and urgency.   Musculoskeletal: Positive for arthralgias and myalgias. Negative for back pain.   Skin: Negative for color change and rash.   Neurological: Positive for dizziness, weakness and numbness. Negative for seizures, syncope, light-headedness and headaches.   Hematological: Negative for adenopathy. Does not bruise/bleed easily.   Psychiatric/Behavioral:  Positive for confusion, dysphoric mood and sleep disturbance. The patient is nervous/anxious.        Objective     Vitals:    08/30/19 1523   BP: 102/70   Pulse: 71   Temp: 97.1 °F (36.2 °C)   SpO2: 99%       Physical Exam   Constitutional: She is oriented to person, place, and time. She appears well-developed and well-nourished.   HENT:   Head: Normocephalic.   Mouth/Throat: Oropharynx is clear and moist.   Eyes: Conjunctivae are normal. Pupils are equal, round, and reactive to light.   Neck: Neck supple. No JVD present. No thyromegaly present.   Cardiovascular: Normal rate, regular rhythm, normal heart sounds, intact distal pulses and normal pulses.   No murmur heard.  Pulmonary/Chest: Effort normal and breath sounds normal. No respiratory distress.   Abdominal: Soft. Bowel sounds are normal. There is no hepatosplenomegaly. There is no tenderness.   Musculoskeletal: She exhibits no edema.   Lymphadenopathy:     She has no cervical adenopathy.   Neurological: She is alert and oriented to person, place, and time. No sensory deficit.   Skin: Skin is warm and dry. No rash noted. She is not diaphoretic.   Psychiatric: She has a normal mood and affect. Her behavior is normal.   Nursing note and vitals reviewed.      Assessment/Plan   Problem List Items Addressed This Visit        Cardiovascular and Mediastinum    Hypertension - Primary    Relevant Orders    Basic Metabolic Panel       Endocrine    [place]'s disease (CMS/HCC)      Other Visit Diagnoses     Hyperkalemia        Relevant Orders    Basic Metabolic Panel    Low magnesium level        Relevant Orders    Magnesium    Fatigue, unspecified type

## 2019-09-03 ENCOUNTER — OFFICE VISIT (OUTPATIENT)
Dept: ENDOCRINOLOGY | Facility: CLINIC | Age: 64
End: 2019-09-03

## 2019-09-03 VITALS
WEIGHT: 144.4 LBS | BODY MASS INDEX: 33.42 KG/M2 | HEART RATE: 74 BPM | HEIGHT: 55 IN | SYSTOLIC BLOOD PRESSURE: 120 MMHG | OXYGEN SATURATION: 98 % | DIASTOLIC BLOOD PRESSURE: 80 MMHG

## 2019-09-03 DIAGNOSIS — E55.9 VITAMIN D DEFICIENCY: ICD-10-CM

## 2019-09-03 DIAGNOSIS — E27.1 ADDISON'S DISEASE (HCC): ICD-10-CM

## 2019-09-03 DIAGNOSIS — R79.0 LOW MAGNESIUM LEVEL: ICD-10-CM

## 2019-09-03 DIAGNOSIS — E87.5 HYPERKALEMIA: ICD-10-CM

## 2019-09-03 DIAGNOSIS — E89.0 POSTABLATIVE HYPOTHYROIDISM: ICD-10-CM

## 2019-09-03 DIAGNOSIS — IMO0001 UNCONTROLLED DIABETES MELLITUS TYPE 2 WITHOUT COMPLICATIONS: Primary | ICD-10-CM

## 2019-09-03 DIAGNOSIS — I10 ESSENTIAL HYPERTENSION: ICD-10-CM

## 2019-09-03 LAB
25(OH)D3 SERPL-MCNC: 45.3 NG/ML (ref 30–100)
ALBUMIN SERPL-MCNC: 4.7 G/DL (ref 3.5–5.2)
ALBUMIN/GLOB SERPL: 2.1 G/DL
ALP SERPL-CCNC: 79 U/L (ref 39–117)
ALT SERPL W P-5'-P-CCNC: 11 U/L (ref 1–33)
ANION GAP SERPL CALCULATED.3IONS-SCNC: 8.4 MMOL/L (ref 5–15)
AST SERPL-CCNC: 18 U/L (ref 1–32)
BILIRUB SERPL-MCNC: 0.5 MG/DL (ref 0.2–1.2)
BUN BLD-MCNC: 10 MG/DL (ref 8–23)
BUN/CREAT SERPL: 10.9 (ref 7–25)
CALCIUM SPEC-SCNC: 10.5 MG/DL (ref 8.6–10.5)
CHLORIDE SERPL-SCNC: 103 MMOL/L (ref 98–107)
CHOLEST SERPL-MCNC: 140 MG/DL (ref 0–200)
CO2 SERPL-SCNC: 26.6 MMOL/L (ref 22–29)
CREAT BLD-MCNC: 0.92 MG/DL (ref 0.57–1)
GFR SERPL CREATININE-BSD FRML MDRD: 61 ML/MIN/1.73
GLOBULIN UR ELPH-MCNC: 2.2 GM/DL
GLUCOSE BLD-MCNC: 118 MG/DL (ref 65–99)
GLUCOSE BLDC GLUCOMTR-MCNC: 133 MG/DL (ref 70–130)
HBA1C MFR BLD: 6.3 %
HDLC SERPL-MCNC: 48 MG/DL (ref 40–60)
LDLC SERPL CALC-MCNC: 66 MG/DL (ref 0–100)
LDLC/HDLC SERPL: 1.38 {RATIO}
MAGNESIUM SERPL-MCNC: 1.6 MG/DL (ref 1.6–2.4)
POTASSIUM BLD-SCNC: 6.6 MMOL/L (ref 3.5–5.2)
PROT SERPL-MCNC: 6.9 G/DL (ref 6–8.5)
SODIUM BLD-SCNC: 138 MMOL/L (ref 136–145)
T4 FREE SERPL-MCNC: 1 NG/DL (ref 0.93–1.7)
TRIGL SERPL-MCNC: 128 MG/DL (ref 0–150)
TSH SERPL DL<=0.05 MIU/L-ACNC: 2.28 UIU/ML (ref 0.27–4.2)
VIT B12 BLD-MCNC: 1467 PG/ML (ref 211–946)
VLDLC SERPL-MCNC: 25.6 MG/DL (ref 5–40)

## 2019-09-03 PROCEDURE — 82947 ASSAY GLUCOSE BLOOD QUANT: CPT | Performed by: INTERNAL MEDICINE

## 2019-09-03 PROCEDURE — 83036 HEMOGLOBIN GLYCOSYLATED A1C: CPT | Performed by: INTERNAL MEDICINE

## 2019-09-03 PROCEDURE — 80061 LIPID PANEL: CPT | Performed by: INTERNAL MEDICINE

## 2019-09-03 PROCEDURE — 84244 ASSAY OF RENIN: CPT | Performed by: INTERNAL MEDICINE

## 2019-09-03 PROCEDURE — 82306 VITAMIN D 25 HYDROXY: CPT | Performed by: INTERNAL MEDICINE

## 2019-09-03 PROCEDURE — 80053 COMPREHEN METABOLIC PANEL: CPT | Performed by: INTERNAL MEDICINE

## 2019-09-03 PROCEDURE — 83735 ASSAY OF MAGNESIUM: CPT | Performed by: INTERNAL MEDICINE

## 2019-09-03 PROCEDURE — 82607 VITAMIN B-12: CPT | Performed by: INTERNAL MEDICINE

## 2019-09-03 PROCEDURE — 84439 ASSAY OF FREE THYROXINE: CPT | Performed by: INTERNAL MEDICINE

## 2019-09-03 PROCEDURE — 82024 ASSAY OF ACTH: CPT | Performed by: INTERNAL MEDICINE

## 2019-09-03 PROCEDURE — 84443 ASSAY THYROID STIM HORMONE: CPT | Performed by: INTERNAL MEDICINE

## 2019-09-03 PROCEDURE — 99214 OFFICE O/P EST MOD 30 MIN: CPT | Performed by: INTERNAL MEDICINE

## 2019-09-03 RX ORDER — HYDROCORTISONE 5 MG/1
TABLET ORAL
Qty: 360 TABLET | Refills: 1 | Status: SHIPPED | OUTPATIENT
Start: 2019-09-03 | End: 2019-12-20 | Stop reason: SDUPTHER
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
## 2019-09-04 DIAGNOSIS — E87.5 HYPERKALEMIA: Primary | ICD-10-CM

## 2019-09-04 LAB — ACTH PLAS-MCNC: 216.2 PG/ML (ref 7.2–63.3)

## 2019-09-05 ENCOUNTER — TELEPHONE (OUTPATIENT)
Dept: FAMILY MEDICINE CLINIC | Facility: CLINIC | Age: 64
End: 2019-09-05

## 2019-09-05 NOTE — TELEPHONE ENCOUNTER
----- Message from Elvira Ayala MA sent at 9/5/2019  4:31 PM EDT -----  Please see Dr. Morales notes.  I have spoken to patient and confirmed they will be there tomorrow at 3:30

## 2019-09-06 ENCOUNTER — HOSPITAL ENCOUNTER (EMERGENCY)
Facility: HOSPITAL | Age: 64
Discharge: HOME | End: 2019-09-06
Attending: EMERGENCY MEDICINE | Admitting: EMERGENCY MEDICINE

## 2019-09-06 ENCOUNTER — APPOINTMENT (OUTPATIENT)
Dept: GENERAL RADIOLOGY | Facility: HOSPITAL | Age: 64
End: 2019-09-06

## 2019-09-06 ENCOUNTER — TELEPHONE (OUTPATIENT)
Dept: INTERNAL MEDICINE | Facility: CLINIC | Age: 64
End: 2019-09-06

## 2019-09-06 VITALS
SYSTOLIC BLOOD PRESSURE: 132 MMHG | OXYGEN SATURATION: 96 % | TEMPERATURE: 98.6 F | WEIGHT: 142 LBS | HEIGHT: 55 IN | HEART RATE: 86 BPM | BODY MASS INDEX: 32.86 KG/M2 | DIASTOLIC BLOOD PRESSURE: 80 MMHG | RESPIRATION RATE: 16 BRPM

## 2019-09-06 DIAGNOSIS — R42 ORTHOSTATIC DIZZINESS: Primary | ICD-10-CM

## 2019-09-06 DIAGNOSIS — E83.42 HYPOMAGNESEMIA: ICD-10-CM

## 2019-09-06 LAB
ALBUMIN SERPL-MCNC: 4.2 G/DL (ref 3.5–5.2)
ALBUMIN/GLOB SERPL: 1.5 G/DL
ALP SERPL-CCNC: 75 U/L (ref 39–117)
ALT SERPL W P-5'-P-CCNC: 12 U/L (ref 1–33)
ANION GAP SERPL CALCULATED.3IONS-SCNC: 13 MMOL/L (ref 5–15)
AST SERPL-CCNC: 21 U/L (ref 1–32)
BACTERIA UR QL AUTO: ABNORMAL /HPF
BASOPHILS # BLD AUTO: 0.05 10*3/MM3 (ref 0–0.2)
BASOPHILS NFR BLD AUTO: 0.5 % (ref 0–1.5)
BILIRUB SERPL-MCNC: 0.5 MG/DL (ref 0.2–1.2)
BILIRUB UR QL STRIP: NEGATIVE
BUN BLD-MCNC: 11 MG/DL (ref 8–23)
BUN/CREAT SERPL: 12 (ref 7–25)
CALCIUM SPEC-SCNC: 9.1 MG/DL (ref 8.6–10.5)
CHLORIDE SERPL-SCNC: 103 MMOL/L (ref 98–107)
CLARITY UR: CLEAR
CO2 SERPL-SCNC: 23 MMOL/L (ref 22–29)
COLOR UR: YELLOW
CREAT BLD-MCNC: 0.92 MG/DL (ref 0.57–1)
DEPRECATED RDW RBC AUTO: 38 FL (ref 37–54)
EOSINOPHIL # BLD AUTO: 0.06 10*3/MM3 (ref 0–0.4)
EOSINOPHIL NFR BLD AUTO: 0.6 % (ref 0.3–6.2)
ERYTHROCYTE [DISTWIDTH] IN BLOOD BY AUTOMATED COUNT: 11.9 % (ref 12.3–15.4)
GFR SERPL CREATININE-BSD FRML MDRD: 61 ML/MIN/1.73
GLOBULIN UR ELPH-MCNC: 2.8 GM/DL
GLUCOSE BLD-MCNC: 156 MG/DL (ref 65–99)
GLUCOSE UR STRIP-MCNC: NEGATIVE MG/DL
HCT VFR BLD AUTO: 37.1 % (ref 34–46.6)
HGB BLD-MCNC: 12.4 G/DL (ref 12–15.9)
HGB UR QL STRIP.AUTO: NEGATIVE
HOLD SPECIMEN: NORMAL
HOLD SPECIMEN: NORMAL
HYALINE CASTS UR QL AUTO: ABNORMAL /LPF
IMM GRANULOCYTES # BLD AUTO: 0.08 10*3/MM3 (ref 0–0.05)
IMM GRANULOCYTES NFR BLD AUTO: 0.8 % (ref 0–0.5)
KETONES UR QL STRIP: NEGATIVE
LEUKOCYTE ESTERASE UR QL STRIP.AUTO: ABNORMAL
LYMPHOCYTES # BLD AUTO: 1.2 10*3/MM3 (ref 0.7–3.1)
LYMPHOCYTES NFR BLD AUTO: 11.7 % (ref 19.6–45.3)
MAGNESIUM SERPL-MCNC: 1.3 MG/DL (ref 1.6–2.4)
MCH RBC QN AUTO: 29.2 PG (ref 26.6–33)
MCHC RBC AUTO-ENTMCNC: 33.4 G/DL (ref 31.5–35.7)
MCV RBC AUTO: 87.3 FL (ref 79–97)
MONOCYTES # BLD AUTO: 0.6 10*3/MM3 (ref 0.1–0.9)
MONOCYTES NFR BLD AUTO: 5.8 % (ref 5–12)
NEUTROPHILS # BLD AUTO: 8.28 10*3/MM3 (ref 1.7–7)
NEUTROPHILS NFR BLD AUTO: 80.6 % (ref 42.7–76)
NITRITE UR QL STRIP: NEGATIVE
NRBC BLD AUTO-RTO: 0 /100 WBC (ref 0–0.2)
PH UR STRIP.AUTO: <=5 [PH] (ref 5–8)
PLATELET # BLD AUTO: 201 10*3/MM3 (ref 140–450)
PMV BLD AUTO: 9.1 FL (ref 6–12)
POTASSIUM BLD-SCNC: 4.5 MMOL/L (ref 3.5–5.2)
PROT SERPL-MCNC: 7 G/DL (ref 6–8.5)
PROT UR QL STRIP: NEGATIVE
RBC # BLD AUTO: 4.25 10*6/MM3 (ref 3.77–5.28)
RBC # UR: ABNORMAL /HPF
REF LAB TEST METHOD: ABNORMAL
RENIN PLAS-CCNC: 1.37 NG/ML/HR (ref 0.17–5.38)
SODIUM BLD-SCNC: 139 MMOL/L (ref 136–145)
SP GR UR STRIP: 1.01 (ref 1–1.03)
SQUAMOUS #/AREA URNS HPF: ABNORMAL /HPF
TROPONIN T SERPL-MCNC: <0.01 NG/ML (ref 0–0.03)
UROBILINOGEN UR QL STRIP: ABNORMAL
WBC NRBC COR # BLD: 10.27 10*3/MM3 (ref 3.4–10.8)
WBC UR QL AUTO: ABNORMAL /HPF
WHOLE BLOOD HOLD SPECIMEN: NORMAL
WHOLE BLOOD HOLD SPECIMEN: NORMAL

## 2019-09-06 PROCEDURE — 93005 ELECTROCARDIOGRAM TRACING: CPT | Performed by: EMERGENCY MEDICINE

## 2019-09-06 PROCEDURE — 87086 URINE CULTURE/COLONY COUNT: CPT | Performed by: NURSE PRACTITIONER

## 2019-09-06 PROCEDURE — 81001 URINALYSIS AUTO W/SCOPE: CPT | Performed by: EMERGENCY MEDICINE

## 2019-09-06 PROCEDURE — 25010000002 MAGNESIUM SULFATE IN D5W 1G/100ML (PREMIX) 1-5 GM/100ML-% SOLUTION: Performed by: NURSE PRACTITIONER

## 2019-09-06 PROCEDURE — 99284 EMERGENCY DEPT VISIT MOD MDM: CPT

## 2019-09-06 PROCEDURE — 84484 ASSAY OF TROPONIN QUANT: CPT | Performed by: EMERGENCY MEDICINE

## 2019-09-06 PROCEDURE — 80053 COMPREHEN METABOLIC PANEL: CPT | Performed by: EMERGENCY MEDICINE

## 2019-09-06 PROCEDURE — 83735 ASSAY OF MAGNESIUM: CPT | Performed by: EMERGENCY MEDICINE

## 2019-09-06 PROCEDURE — 85025 COMPLETE CBC W/AUTO DIFF WBC: CPT | Performed by: EMERGENCY MEDICINE

## 2019-09-06 PROCEDURE — 96365 THER/PROPH/DIAG IV INF INIT: CPT

## 2019-09-06 PROCEDURE — 71045 X-RAY EXAM CHEST 1 VIEW: CPT

## 2019-09-06 RX ORDER — ONDANSETRON 4 MG/1
4 TABLET, FILM COATED ORAL ONCE
Status: DISCONTINUED | OUTPATIENT
Start: 2019-09-06 | End: 2019-09-06

## 2019-09-06 RX ORDER — MAGNESIUM 30 MG
30 TABLET ORAL DAILY
Qty: 90 TABLET | Refills: 1 | Status: SHIPPED | OUTPATIENT
Start: 2019-09-06 | End: 2019-09-13 | Stop reason: SDUPTHER

## 2019-09-06 RX ORDER — PROMETHAZINE HYDROCHLORIDE 25 MG/1
25 TABLET ORAL EVERY 6 HOURS PRN
Status: ON HOLD | COMMUNITY
End: 2019-11-30

## 2019-09-06 RX ORDER — SODIUM CHLORIDE 0.9 % (FLUSH) 0.9 %
10 SYRINGE (ML) INJECTION AS NEEDED
Status: DISCONTINUED | OUTPATIENT
Start: 2019-09-06 | End: 2019-09-06 | Stop reason: HOSPADM

## 2019-09-06 RX ORDER — MAGNESIUM SULFATE 1 G/100ML
1 INJECTION INTRAVENOUS ONCE
Status: COMPLETED | OUTPATIENT
Start: 2019-09-06 | End: 2019-09-06

## 2019-09-06 RX ADMIN — SODIUM CHLORIDE 1000 ML: 9 INJECTION, SOLUTION INTRAVENOUS at 14:45

## 2019-09-06 RX ADMIN — MAGNESIUM SULFATE HEPTAHYDRATE 1 G: 1 INJECTION, SOLUTION INTRAVENOUS at 16:24

## 2019-09-06 NOTE — TELEPHONE ENCOUNTER
PATIENT IS NOT FEELING WELL AT ALL AND IS GOING TO TAKE PATIENT TO [facility]. HE WOULD LIKE A CALL BACK ABOUT PATIENTS POTASSIUM LEVELS AND PATIENTS HEALTH CONCERNS. SHE IS FEELING REALLY DIZZY AND CAN BARLEY STAND DUE TO WEAKNESS. HE WANTS TO SEE WHAT DR GUZMAN SUGGEST THEY TELL HOSPITAL ONCE PATIENTS GETS THERE. HE STATES WE SPOKE WITH YOU YESTERDAY ABOUT THESE ISSUES. PHONE NUMBER [phone]

## 2019-09-06 NOTE — ED PROVIDER NOTES
Subjective   Karen Rubio is a 63 y.o.female who presents to the ED with complaints of generalized weakness. The patient has been experiencing generalized weakness for the past few days. She was seen by her endocrinologist, Dr. Morales, on 9/4. She received a call from Dr. Morales today, informing her to come to the emergency department for an evaluation due to abnormal magnesium and potassium. She also complains of dizziness, nausea, and a decreased appetite, but she denies any weight loss, vomiting, or diarrhea. Additionally, she has a history of ovarian cancer. Of note, she was recently started on Diclofenac. There are no other complaints at this time.         History provided by:  Patient  Weakness - Generalized   Severity:  Moderate  Onset quality:  Gradual  Duration: few days.  Timing:  Constant  Progression:  Worsening  Chronicity:  New  Relieved by:  None tried  Worsened by:  Nothing  Ineffective treatments:  None tried  Associated symptoms: dizziness and nausea    Associated symptoms: no diarrhea and no vomiting        Review of Systems   Constitutional: Positive for appetite change. Negative for unexpected weight change.   Gastrointestinal: Positive for nausea. Negative for diarrhea and vomiting.   Neurological: Positive for dizziness and weakness (generalized).   All other systems reviewed and are negative.      Past Medical History:   Diagnosis Date   • Acute bronchitis    • Acute head trauma    • Arthritis    • Arthropathy of ankle and foot    • Arthropathy, multiple sites    • Asthma    • Cancer (CMS/HCC)    • Chronic bronchitis (CMS/HCC)    • Chronic constipation    • Cough    • Depression    • Diabetes mellitus (CMS/HCC)    • Dysuria    • Enthesopathy of ankle/tarsus    • Facial injury    • Fibromyalgia    • Fracture of left fibula    • Gall stone    • GERD (gastroesophageal reflux disease)    • Gout    • Hammer toe    • Heart disease    • Hyperlipidemia    • Idiopathic peripheral neuropathy    •  Joint pain of right hip on movement    • Lumbago    • Migraine    • Mixed sensory-motor polyneuropathy    • Myalgia and myositis    • Nausea and vomiting    • Osteoporosis    • Ovarian cancer (CMS/HCC)    • Ovarian cyst    • Overweight    • Pleurisy    • Pustule    • Rash     Last Impression: 09 Feb 2015  Likely contact dermatitis secondary to new clothing- rash has similarity to shingles but distribution does not correspond with shingles. Trial of topical steroid, sent in clobetasol. Refer to derm for f/u if no improvement or worsening of rash.   • Recurrent boils    • Sinusitis, acute    • Stress fracture    • Thyroid disorder    • Tibial plateau fracture, left    • Urinary incontinence    • UTI (urinary tract infection)    • Vitamin B12 deficiency      · Last Impression: 29 Jan 2015  Check labs as ordered. Gave pt vitamin b12 IM in office today. Will continue monthly shots to maintain level.  Phuong Almaguer (Internal Medicine)   • Vitamin D deficiency        Allergies   Allergen Reactions   • Valium [Diazepam]        Past Surgical History:   Procedure Laterality Date   • OTHER SURGICAL HISTORY      anastomosis of gallbladder   • OVARY SURGERY     • TUBAL ABDOMINAL LIGATION         Family History   Problem Relation Age of Onset   • Hyperlipidemia Mother    • Hypertension Mother    • Thyroid disease Mother    • Hyperlipidemia Father    • Hypertension Father    • Kidney disease Sister    • Thyroid disease Sister    • Thyroid disease Son    • Other Son         Suicide   • Heart attack Other    • Arthritis Other    • Cancer Other    • Mental illness Other    • Migraines Other    • Hyperlipidemia Other    • Hypertension Other    • Stroke Other        Social History     Socioeconomic History   • Marital status:      Spouse name: Not on file   • Number of children: Not on file   • Years of education: Not on file   • Highest education level: Not on file   Tobacco Use   • Smoking status: Never Smoker   • Smokeless  tobacco: Never Used   Substance and Sexual Activity   • Alcohol use: No   • Drug use: No   • Sexual activity: Defer         Objective   Physical Exam   Constitutional: She is oriented to person, place, and time. She appears well-developed and well-nourished.   HENT:   Head: Normocephalic and atraumatic.   Eyes: Conjunctivae are normal. No scleral icterus.   Neck: Normal range of motion. Neck supple.   Cardiovascular: Normal rate, regular rhythm, normal heart sounds and intact distal pulses.   No murmur heard.  Pulmonary/Chest: Effort normal and breath sounds normal. No respiratory distress.   Abdominal: Soft. Bowel sounds are normal. There is no tenderness.   Musculoskeletal: Normal range of motion. She exhibits no edema.   Neurological: She is alert and oriented to person, place, and time.   Skin: Skin is warm and dry.   Psychiatric: She has a normal mood and affect. Her behavior is normal.   Nursing note and vitals reviewed.      Procedures         ED Course  ED Course as of Sep 06 1607   Fri Sep 06, 2019   1330 E Kr[address]to[address] complete. [id number]  [TB]   1401 Records from [facility] reviewed, her potassium was 6.6 and her renal function was normal.   [TB]      ED Course User Index  [TB] Javier Evans     Recent Results (from the past 24 hour(s))   Urinalysis With Microscopic If Indicated (No Culture) - Urine, Clean Catch    Collection Time: 09/06/19  1:46 PM   Result Value Ref Range    Color, UA Yellow Yellow, Straw    Appearance, UA Clear Clear    pH, UA <=5.0 5.0 - 8.0    Specific Gravity, UA 1.010 1.001 - 1.030    Glucose, UA Negative Negative    Ketones, UA Negative Negative    Bilirubin, UA Negative Negative    Blood, UA Negative Negative    Protein, UA Negative Negative    Leuk Esterase, UA Large (3+) (A) Negative    Nitrite, UA Negative Negative    Urobilinogen, UA 1.0 E.U./dL 0.2 - 1.0 E.U./dL   Urinalysis, Microscopic Only - Urine, Clean Catch    Collection Time: 09/06/19  1:46 PM   Result Value Ref Range    RBC,  UA 3-6 (A) None Seen, 0-2 /HPF    WBC, UA 6-12 (A) None Seen, 0-2 /HPF    Bacteria, UA None Seen None Seen, Trace /HPF    Squamous Epithelial Cells, UA 0-2 None Seen, 0-2 /HPF    Hyaline Casts, UA 0-6 0 - 6 /LPF    Methodology Automated Microscopy    Comprehensive Metabolic Panel    Collection Time: 09/06/19  2:26 PM   Result Value Ref Range    Glucose 156 (H) 65 - 99 mg/dL    BUN 11 8 - 23 mg/dL    Creatinine 0.92 0.57 - 1.00 mg/dL    Sodium 139 136 - 145 mmol/L    Potassium 4.5 3.5 - 5.2 mmol/L    Chloride 103 98 - 107 mmol/L    CO2 23.0 22.0 - 29.0 mmol/L    Calcium 9.1 8.6 - 10.5 mg/dL    Total Protein 7.0 6.0 - 8.5 g/dL    Albumin 4.20 3.50 - 5.20 g/dL    ALT (SGPT) 12 1 - 33 U/L    AST (SGOT) 21 1 - 32 U/L    Alkaline Phosphatase 75 39 - 117 U/L    Total Bilirubin 0.5 0.2 - 1.2 mg/dL    eGFR Non African Amer 61 >60 mL/min/1.73    Globulin 2.8 gm/dL    A/G Ratio 1.5 g/dL    BUN/Creatinine Ratio 12.0 7.0 - 25.0    Anion Gap 13.0 5.0 - 15.0 mmol/L   Troponin    Collection Time: 09/06/19  2:26 PM   Result Value Ref Range    Troponin T <0.010 0.000 - 0.030 ng/mL   Magnesium    Collection Time: 09/06/19  2:26 PM   Result Value Ref Range    Magnesium 1.3 (L) 1.6 - 2.4 mg/dL   Light Blue Top    Collection Time: 09/06/19  2:26 PM   Result Value Ref Range    Extra Tube hold for add-on    Green Top (Gel)    Collection Time: 09/06/19  2:26 PM   Result Value Ref Range    Extra Tube Hold for add-ons.    Lavender Top    Collection Time: 09/06/19  2:26 PM   Result Value Ref Range    Extra Tube hold for add-on    Gold Top - SST    Collection Time: 09/06/19  2:26 PM   Result Value Ref Range    Extra Tube Hold for add-ons.    CBC Auto Differential    Collection Time: 09/06/19  2:26 PM   Result Value Ref Range    WBC 10.27 3.40 - 10.80 10*3/mm3    RBC 4.25 3.77 - 5.28 10*6/mm3    Hemoglobin 12.4 12.0 - 15.9 g/dL    Hematocrit 37.1 34.0 - 46.6 %    MCV 87.3 79.0 - 97.0 fL    MCH 29.2 26.6 - 33.0 pg    MCHC 33.4 31.5 - 35.7 g/dL     RDW 11.9 (L) 12.3 - 15.4 %    RDW-SD 38.0 37.0 - 54.0 fl    MPV 9.1 6.0 - 12.0 fL    Platelets 201 140 - 450 10*3/mm3    Neutrophil % 80.6 (H) 42.7 - 76.0 %    Lymphocyte % 11.7 (L) 19.6 - 45.3 %    Monocyte % 5.8 5.0 - 12.0 %    Eosinophil % 0.6 0.3 - 6.2 %    Basophil % 0.5 0.0 - 1.5 %    Immature Grans % 0.8 (H) 0.0 - 0.5 %    Neutrophils, Absolute 8.28 (H) 1.70 - 7.00 10*3/mm3    Lymphocytes, Absolute 1.20 0.70 - 3.10 10*3/mm3    Monocytes, Absolute 0.60 0.10 - 0.90 10*3/mm3    Eosinophils, Absolute 0.06 0.00 - 0.40 10*3/mm3    Basophils, Absolute 0.05 0.00 - 0.20 10*3/mm3    Immature Grans, Absolute 0.08 (H) 0.00 - 0.05 10*3/mm3    nRBC 0.0 0.0 - 0.2 /100 WBC     Note: In addition to lab results from this visit, the labs listed above may include labs taken at another facility or during a different encounter within the last 24 hours. Please correlate lab times with ED admission and discharge times for further clarification of the services performed during this visit.    XR Chest 1 View   Preliminary Result   No acute cardiopulmonary process with borderline cardiac   enlargement similar to prior, however, no overt edema or effusion.       D:  09/06/2019   E:  09/06/2019                    Vitals:    09/06/19 1441 09/06/19 1442 09/06/19 1500 09/06/19 1600   BP: 135/80 151/85 143/75 137/77   BP Location:       Patient Position: Sitting Standing     Pulse: 100 97 89 90   Resp:   16 16   Temp:       TempSrc:       SpO2:   94% 95%   Weight:       Height:         Medications   sodium chloride 0.9 % flush 10 mL (not administered)   magnesium sulfate in D5W 1g/100mL (PREMIX) (not administered)   sodium chloride 0.9 % bolus 1,000 mL (0 mL Intravenous Stopped 9/6/19 1540)     ECG/EMG Results (last 24 hours)     Procedure Component Value Units Date/Time    ECG 12 Lead [[id number]] Collected:  09/06/19 1342     Updated:  09/06/19 1344        ECG 12 Lead   Final Result   Test Reason : Weak/Dizzy/AMS protocol   Blood Pressure :  **/** mmHG   Vent. Rate : 088 BPM     Atrial Rate : 088 BPM      P-R Int : 152 ms          QRS Dur : 062 ms       QT Int : 362 ms       P-R-T Axes : 067 -02 062 degrees      QTc Int : 438 ms      Normal sinus rhythm   When compared with ECG of 30-JUN-2019 00:08,   No significant change was found   Confirmed by SWETA MONTGOMERY MD ([id number]) on 9/6/2019 2:33:51 PM      Referred By:  EDMD           Confirmed By:SWETA MONTGOMERY MD                          [facility]    Final diagnoses:   Orthostatic dizziness   Hypomagnesemia       Documentation assistance provided by marie Evans.  Information recorded by the scribe was done at my direction and has been verified and validated by me.     Javier Evans  09/06/19 1048       Melisa Marcus, REDD  09/06/19 6849

## 2019-09-06 NOTE — TELEPHONE ENCOUNTER
When I spoke to patient yesterday, they were going to see Dr. Kelly today at 3:30, but patient must has gotten worse.  I LMOM advising them that once at the hospital, to have the ER  Call you directly

## 2019-09-07 LAB — BACTERIA SPEC AEROBE CULT: NORMAL

## 2019-09-12 ENCOUNTER — TELEPHONE (OUTPATIENT)
Dept: ENDOCRINOLOGY | Facility: CLINIC | Age: 64
End: 2019-09-12

## 2019-09-12 NOTE — TELEPHONE ENCOUNTER
Called to check on patient and give her, her results.  She stated she was doing better. She was given a RX for 30mg Magnesium from the Hospital, that ws printed  but lost it. She asked if we could send to the pharmacy.  It was written by another provider.  I need you authorization to do so.  Please advise on RX for Mag, to send to [facility].

## 2019-09-13 RX ORDER — MAGNESIUM 30 MG
30 TABLET ORAL DAILY
Qty: 30 TABLET | Refills: 1 | Status: SHIPPED | OUTPATIENT
Start: 2019-09-13 | End: 2019-11-30

## 2019-09-17 DIAGNOSIS — E27.1 ADDISON'S DISEASE (HCC): ICD-10-CM

## 2019-09-18 RX ORDER — HYDROCORTISONE 5 MG/1
TABLET ORAL
Qty: 120 TABLET | Refills: 0 | OUTPATIENT
Start: 2019-09-18

## 2019-10-24 ENCOUNTER — CLINICAL SUPPORT (OUTPATIENT)
Dept: INTERNAL MEDICINE | Facility: CLINIC | Age: 64
End: 2019-10-24

## 2019-10-24 ENCOUNTER — TELEPHONE (OUTPATIENT)
Dept: FAMILY MEDICINE CLINIC | Facility: CLINIC | Age: 64
End: 2019-10-24

## 2019-10-24 DIAGNOSIS — E27.1 ADDISON'S DISEASE (HCC): Primary | ICD-10-CM

## 2019-10-24 PROCEDURE — 96372 THER/PROPH/DIAG INJ SC/IM: CPT | Performed by: INTERNAL MEDICINE

## 2019-10-24 RX ORDER — CYANOCOBALAMIN 1000 UG/ML
1000 INJECTION, SOLUTION INTRAMUSCULAR; SUBCUTANEOUS
Status: DISCONTINUED | OUTPATIENT
Start: 2019-10-24 | End: 2020-01-06

## 2019-10-24 RX ADMIN — CYANOCOBALAMIN 1000 MCG: 1000 INJECTION, SOLUTION INTRAMUSCULAR; SUBCUTANEOUS at 17:16

## 2019-10-24 NOTE — TELEPHONE ENCOUNTER
----- Message from Clay Fuentes sent at 10/24/2019  8:51 AM EDT -----  Contact: ESTEBAN FROM NURSE ON CALL [phone]  Esteban is calling to let us know that the patient fell last Wednesday.  Esteban states patient has some pain in the rib area.  Per Esteban, patient does not want to go to the hospital.  Esteban does not think the patient needs to come into the office; she just wants us to know the patient fell

## 2019-10-30 ENCOUNTER — TELEPHONE (OUTPATIENT)
Dept: INTERNAL MEDICINE | Facility: CLINIC | Age: 64
End: 2019-10-30

## 2019-10-30 DIAGNOSIS — E55.9 VITAMIN D DEFICIENCY: ICD-10-CM

## 2019-10-30 RX ORDER — ERGOCALCIFEROL 1.25 MG/1
CAPSULE ORAL
Qty: 4 CAPSULE | Refills: 3 | Status: SHIPPED | OUTPATIENT
Start: 2019-10-30 | End: 2020-01-21

## 2019-11-01 ENCOUNTER — OFFICE VISIT (OUTPATIENT)
Dept: FAMILY MEDICINE CLINIC | Facility: CLINIC | Age: 64
End: 2019-11-01

## 2019-11-01 VITALS
DIASTOLIC BLOOD PRESSURE: 82 MMHG | OXYGEN SATURATION: 98 % | BODY MASS INDEX: 33.33 KG/M2 | WEIGHT: 144 LBS | HEIGHT: 55 IN | SYSTOLIC BLOOD PRESSURE: 132 MMHG | TEMPERATURE: 97.8 F | HEART RATE: 78 BPM

## 2019-11-01 DIAGNOSIS — R10.32 SEVERE LEFT GROIN PAIN: Primary | ICD-10-CM

## 2019-11-01 DIAGNOSIS — Z85.43 HISTORY OF OVARIAN CANCER: ICD-10-CM

## 2019-11-01 DIAGNOSIS — J30.2 SEASONAL ALLERGIC RHINITIS: ICD-10-CM

## 2019-11-01 DIAGNOSIS — J45.20 MILD INTERMITTENT ASTHMA WITHOUT COMPLICATION: ICD-10-CM

## 2019-11-01 DIAGNOSIS — M25.551 PAIN OF RIGHT HIP JOINT: ICD-10-CM

## 2019-11-01 PROCEDURE — 99214 OFFICE O/P EST MOD 30 MIN: CPT | Performed by: FAMILY MEDICINE

## 2019-11-01 RX ORDER — ALBUTEROL SULFATE 90 UG/1
2 AEROSOL, METERED RESPIRATORY (INHALATION) EVERY 4 HOURS PRN
Qty: 3.7 G | Refills: 11 | Status: SHIPPED | OUTPATIENT
Start: 2019-11-01 | End: 2022-04-12 | Stop reason: SDUPTHER

## 2019-11-01 RX ORDER — MONTELUKAST SODIUM 10 MG/1
10 TABLET ORAL DAILY
Qty: 30 TABLET | Refills: 11 | Status: SHIPPED | OUTPATIENT
Start: 2019-11-01 | End: 2020-12-21

## 2019-11-01 RX ORDER — IBUPROFEN 600 MG/1
600 TABLET ORAL EVERY 6 HOURS PRN
Qty: 90 TABLET | Refills: 5 | Status: SHIPPED | OUTPATIENT
Start: 2019-11-01 | End: 2020-05-22

## 2019-11-01 NOTE — PROGRESS NOTES
Subjective   Karen Rubio is a 64 y.o. female    Chief Complaint    Left groin pain  Frequent falls  Asthma and allergies  Requesting letter    History of Present Illness  Patient presents today coming by her  complaining of left groin pain.  She is concerned because of her history of ovarian cancer.  She was seen for her GYN oncology follow-up relatively recently and was told there was no signs of any disease.  Nevertheless she is concerned about metastatic disease.  Patient has a history of surgery and fracture to her left knee she thinks this is part of the reason that she falls.  She is requesting a letter of support to see if she can get out of the lease of her home as it is a split for your so she is up and down stairs to great deal which truly is a difficulty for her because of her knee and hip.  Patient also complaining of respiratory difficulties with asthma and shortness of breath.  She is asking for referral to a lung specialist.    The following portions of the patient's history were reviewed and updated as appropriate: allergies, current medications, past social history and problem list    Review of Systems   Constitutional: Negative for chills, fatigue and fever.   HENT: Positive for congestion, postnasal drip, rhinorrhea and sneezing. Negative for ear pain, hearing loss, sinus pressure, sore throat and trouble swallowing.    Respiratory: Positive for cough, shortness of breath and wheezing. Negative for chest tightness.    Cardiovascular: Negative for chest pain, palpitations and leg swelling.   Musculoskeletal: Positive for arthralgias, back pain, gait problem and myalgias.   Neurological: Positive for dizziness, weakness and light-headedness. Negative for seizures, syncope and headaches.   Hematological: Negative for adenopathy.   Psychiatric/Behavioral: Negative for dysphoric mood. The patient is nervous/anxious.        Objective     Vitals:    11/01/19 1110   BP: 132/82   Pulse: 78    Temp: 97.8 °F (36.6 °C)   SpO2: 98%       Physical Exam   Constitutional: She is oriented to person, place, and time. She appears well-developed.   obese   HENT:   Head: Normocephalic and atraumatic.   Eyes: Conjunctivae are normal. Pupils are equal, round, and reactive to light.   Neck: Neck supple. No thyromegaly present.   Cardiovascular: Normal rate and regular rhythm.   Pulmonary/Chest: Effort normal and breath sounds normal. No respiratory distress. She has no wheezes. She has no rales.   Musculoskeletal:        Left hip: She exhibits decreased range of motion, decreased strength and tenderness. She exhibits no bony tenderness, no crepitus and no deformity.   Neurological: She is alert and oriented to person, place, and time.   Skin: Skin is warm and dry.   Nursing note and vitals reviewed.      Assessment/Plan   Problem List Items Addressed This Visit     None      Visit Diagnoses     Severe left groin pain    -  Primary    Relevant Medications    ibuprofen (ADVIL,MOTRIN) 600 MG tablet    Other Relevant Orders    CT Pelvis Without Contrast    Seasonal allergic rhinitis        Relevant Medications    albuterol sulfate  (90 Base) MCG/ACT inhaler    ibuprofen (ADVIL,MOTRIN) 600 MG tablet    montelukast (SINGULAIR) 10 MG tablet    Pain of right hip joint        Relevant Orders    CT Pelvis Without Contrast    History of ovarian cancer        Relevant Orders    CT Pelvis Without Contrast    Mild intermittent asthma without complication        Relevant Medications    albuterol sulfate  (90 Base) MCG/ACT inhaler    montelukast (SINGULAIR) 10 MG tablet    Other Relevant Orders    Ambulatory Referral to Pulmonology

## 2019-11-07 ENCOUNTER — TELEPHONE (OUTPATIENT)
Dept: FAMILY MEDICINE CLINIC | Facility: CLINIC | Age: 64
End: 2019-11-07

## 2019-11-07 NOTE — TELEPHONE ENCOUNTER
PTS SPOUSE CALLING REGARDING PT HAVING A PELVIC MRI OR CT; WANTS TO KNOW THE STATUS; PLEASE ADVISE

## 2019-11-13 ENCOUNTER — OFFICE VISIT (OUTPATIENT)
Dept: PULMONOLOGY | Facility: CLINIC | Age: 64
End: 2019-11-13

## 2019-11-13 VITALS
HEART RATE: 75 BPM | WEIGHT: 143 LBS | OXYGEN SATURATION: 96 % | DIASTOLIC BLOOD PRESSURE: 80 MMHG | BODY MASS INDEX: 33.09 KG/M2 | SYSTOLIC BLOOD PRESSURE: 112 MMHG | TEMPERATURE: 97.6 F | HEIGHT: 55 IN

## 2019-11-13 DIAGNOSIS — G47.33 OSA (OBSTRUCTIVE SLEEP APNEA): ICD-10-CM

## 2019-11-13 DIAGNOSIS — J45.909 ASTHMA, UNSPECIFIED ASTHMA SEVERITY, UNSPECIFIED WHETHER COMPLICATED, UNSPECIFIED WHETHER PERSISTENT: Primary | ICD-10-CM

## 2019-11-13 DIAGNOSIS — R06.02 SHORTNESS OF BREATH: ICD-10-CM

## 2019-11-13 DIAGNOSIS — E66.9 OBESITY (BMI 30-39.9): ICD-10-CM

## 2019-11-13 PROCEDURE — 82785 ASSAY OF IGE: CPT | Performed by: INTERNAL MEDICINE

## 2019-11-13 PROCEDURE — 94729 DIFFUSING CAPACITY: CPT | Performed by: INTERNAL MEDICINE

## 2019-11-13 PROCEDURE — 83880 ASSAY OF NATRIURETIC PEPTIDE: CPT | Performed by: INTERNAL MEDICINE

## 2019-11-13 PROCEDURE — 36415 COLL VENOUS BLD VENIPUNCTURE: CPT | Performed by: INTERNAL MEDICINE

## 2019-11-13 PROCEDURE — 99205 OFFICE O/P NEW HI 60 MIN: CPT | Performed by: INTERNAL MEDICINE

## 2019-11-13 PROCEDURE — 86606 ASPERGILLUS ANTIBODY: CPT | Performed by: INTERNAL MEDICINE

## 2019-11-13 PROCEDURE — 86003 ALLG SPEC IGE CRUDE XTRC EA: CPT | Performed by: INTERNAL MEDICINE

## 2019-11-13 PROCEDURE — 94060 EVALUATION OF WHEEZING: CPT | Performed by: INTERNAL MEDICINE

## 2019-11-13 PROCEDURE — 94726 PLETHYSMOGRAPHY LUNG VOLUMES: CPT | Performed by: INTERNAL MEDICINE

## 2019-11-13 RX ORDER — FLUTICASONE PROPIONATE 50 MCG
1 SPRAY, SUSPENSION (ML) NASAL DAILY
Qty: 16 ML | Refills: 0 | Status: ON HOLD | OUTPATIENT
Start: 2019-11-13 | End: 2019-12-03 | Stop reason: SDUPTHER

## 2019-11-13 RX ORDER — ALBUTEROL SULFATE 90 UG/1
4 AEROSOL, METERED RESPIRATORY (INHALATION) ONCE
Status: COMPLETED | OUTPATIENT
Start: 2019-11-13 | End: 2019-11-13

## 2019-11-13 RX ADMIN — ALBUTEROL SULFATE 4 PUFF: 90 AEROSOL, METERED RESPIRATORY (INHALATION) at 09:00

## 2019-11-13 NOTE — PROGRESS NOTES
"     New Pulmonary Patient Office Visit      Patient Name: Karen Rubio    Referring Physician: Rob Hernandez*    Chief Complaint:    Chief Complaint   Patient presents with   • Asthma       History of Present Illness: Karen Rubio is a 64 y.o. female who is here today to establish care with Pulmonary.      64-year-old female seen in initial pulmonary consultation today.  Patient states that she is having problem with shortness of breath for past couple of months.  States that she never had any respiratory problems to begin with.  No history of asthma or COPD or smoking history or secondhand smoke exposure in the past.  She used to work as a  and also worked in [employer].  Denies any other occupational or environmental exposures.  She is a very poor historian and quite emotional during this visit.  She told me that she lost 2 of her sons back in  1 2 respiratory illness and another one  of suicide.  Her 's one son passed away on a motorcycle accident as well.  Patient states that she feels depressed and empty all day long.  She does not want to do anything but on the other hand she really wants to get out and enjoy life but she is unable to.  She is not happy about her living situation either.  They are living in a duplex and she is not wanting to stay there any longer.  She is worried about black mold exposure there.  Also she has to go up and down the stairs which affects her breathing and she wants to live with a [place] home and apparently they have gotten letter from the primary care provider to help break the lease and they are working on that aspect.  Patient Bringing Back the Question \"I Think These Are My Nerves Which Are Affecting My Breathing\".  She Does Have History of Sleep Apnea Unable to Tolerate CPAP Device so She Is Not Using It.  She Does Not Sleep Well at Night.  Has Frequent Awakenings and Ruminating Thoughts All Night Long.    Patient denies any " frequent respiratory infections or pneumonias.  She denies any hospitalization because of respiratory issues.  She has been placed on albuterol inhaler by her primary care provider and she is using it.  Technique was not very correct but she was able to correct it on over coaching here.  She tells me that the inhaler helps her to breathe better.  She is only using it twice a day and I advised her to increase it to up to 4 times a day if she needs to.  She is also on Singulair for long time.  She also takes Zyrtec as well.  Given her significant depression and I told her to give time off of Singulair to see if it changes any of her symptoms.  If she starts having more allergy symptoms then she could go back on it but there have been some data suggesting worsening depression with leukotriene inhibitors.  She verbalized understanding.  She is also complaining of nasal congestion and postnasal drip.  She denies any fevers or any headaches currently.  We talked about starting her on Flonase nasal spray and correct technique of using that inhaler reviewed with her.  She will pick it up and try it and see if that helps her open up.  Also talked about discussing with her [employer] company to get a better mask for her sleep apnea treatment and that will help her to sleep better at night and hopefully feel better during the daytime along with that as well.    Subjective      Review of Systems:   Review of Systems   Constitutional: Positive for activity change, appetite change and fatigue.   HENT: Positive for congestion and postnasal drip.    Respiratory: Positive for shortness of breath and wheezing. Negative for cough, choking, chest tightness and stridor.    Cardiovascular: Negative.  Negative for chest pain, palpitations and leg swelling.   Gastrointestinal: Negative.    Endocrine: Negative.    Musculoskeletal: Positive for arthralgias.   Skin: Negative.    Allergic/Immunologic: Positive for environmental allergies.    Neurological: Negative.    Hematological: Negative.    Psychiatric/Behavioral: Positive for decreased concentration, sleep disturbance, depressed mood and stress. Negative for self-injury and suicidal ideas. The patient is nervous/anxious.    All other systems reviewed and are negative.      Past Medical History:   Past Medical History:   Diagnosis Date   • Acute bronchitis    • Acute head trauma    • Arthritis    • Arthropathy of ankle and foot    • Arthropathy, multiple sites    • Asthma    • Cancer (CMS/HCC)    • Chronic bronchitis (CMS/HCC)    • Chronic constipation    • Cough    • Depression    • Diabetes mellitus (CMS/HCC)    • Dysuria    • Enthesopathy of ankle/tarsus    • Facial injury    • Fibromyalgia    • Fracture of left fibula    • Gall stone    • GERD (gastroesophageal reflux disease)    • Gout    • Hammer toe    • Heart disease    • Hyperlipidemia    • Idiopathic peripheral neuropathy    • Joint pain of right hip on movement    • Lumbago    • Migraine    • Mixed sensory-motor polyneuropathy    • Myalgia and myositis    • Nausea and vomiting    • Osteoporosis    • Ovarian cancer (CMS/HCC)    • Ovarian cyst    • Overweight    • Pleurisy    • Pustule    • Rash     Last Impression: 09 Feb 2015  Likely contact dermatitis secondary to new clothing- rash has similarity to shingles but distribution does not correspond with shingles. Trial of topical steroid, sent in clobetasol. Refer to derm for f/u if no improvement or worsening of rash.   • Recurrent boils    • Sinusitis, acute    • Stress fracture    • Thyroid disorder    • Tibial plateau fracture, left    • Urinary incontinence    • UTI (urinary tract infection)    • Vitamin B12 deficiency      · Last Impression: 29 Jan 2015  Check labs as ordered. Gave pt vitamin b12 IM in office today. Will continue monthly shots to maintain level.  Phuong Almaguer (Internal Medicine)   • Vitamin D deficiency        Past Surgical History:   Past Surgical History:  "  Procedure Laterality Date   • OTHER SURGICAL HISTORY      anastomosis of gallbladder   • OVARY SURGERY     • TUBAL ABDOMINAL LIGATION         Family History:   Family History   Problem Relation Age of Onset   • Hyperlipidemia Mother    • Hypertension Mother    • Thyroid disease Mother    • Hyperlipidemia Father    • Hypertension Father    • Kidney disease Sister    • Thyroid disease Sister    • Thyroid disease Son    • Other Son         Suicide   • Heart attack Other    • Arthritis Other    • Cancer Other    • Mental illness Other    • Migraines Other    • Hyperlipidemia Other    • Hypertension Other    • Stroke Other        Social History:   Social History     Socioeconomic History   • Marital status:      Spouse name: Not on file   • Number of children: Not on file   • Years of education: Not on file   • Highest education level: Not on file   Tobacco Use   • Smoking status: Never Smoker   • Smokeless tobacco: Never Used   Substance and Sexual Activity   • Alcohol use: No   • Drug use: No   • Sexual activity: Defer       Medications:     Current Outpatient Medications:   •  albuterol sulfate  (90 Base) MCG/ACT inhaler, Inhale 2 puffs Every 4 (Four) Hours As Needed for Wheezing., Disp: 3.7 g, Rfl: 11  •  ALPRAZolam (XANAX) 1 MG tablet, Take 1 tablet by mouth 4 (Four) Times a Day., Disp: 120 tablet, Rfl: 3  •  B-D 3CC LUER-JELLY SYR 25GX1\" 25G X 1\" 3 ML misc, USE AS DIRECTED FOR B12 INJECTIONS, Disp: 4 each, Rfl: 0  •  cetirizine (zyrTEC) 10 MG tablet, Take 1 tablet by mouth Daily., Disp: 30 tablet, Rfl: 11  •  citalopram (CeleXA) 40 MG tablet, Take 1 tablet by mouth Daily., Disp: 30 tablet, Rfl: 11  •  cyanocobalamin 1000 MCG/ML injection, INJECT 1ML EVERY 2 WEEKS AS DIRECTED, Disp: 2 mL, Rfl: 3  •  fluticasone (FLONASE) 50 MCG/ACT nasal spray, 1 spray into the nostril(s) as directed by provider Daily., Disp: 16 mL, Rfl: 0  •  gabapentin (NEURONTIN) 300 MG capsule, , Disp: , Rfl:   •  glucose blood " "(ONE TOUCH ULTRA TEST) test strip, 1 each by Other route As Needed. 1-2 times a day, Disp: , Rfl:   •  hydrocortisone (CORTEF) 5 MG tablet, TAKE 3 TABLETS BY MOUTH EVERY DAY IN THE MORNING AND ALSO TAKE 1 TABLET EVERY EVENING, Disp: 360 tablet, Rfl: 1  •  ibuprofen (ADVIL,MOTRIN) 600 MG tablet, Take 1 tablet by mouth Every 6 (Six) Hours As Needed for Mild Pain ., Disp: 90 tablet, Rfl: 5  •  levothyroxine (SYNTHROID, LEVOTHROID) 50 MCG tablet, Take 1 tablet by mouth Daily., Disp: 90 tablet, Rfl: 1  •  magnesium 30 MG tablet, Take 1 tablet by mouth Daily., Disp: 30 tablet, Rfl: 1  •  montelukast (SINGULAIR) 10 MG tablet, Take 1 tablet by mouth Daily., Disp: 30 tablet, Rfl: 11  •  ONETOUCH DELICA LANCETS 33G misc, Checks x 1 / day, Disp: , Rfl:   •  oxyCODONE-acetaminophen (PERCOCET)  MG per tablet, Take 1 tablet by mouth Every 4 (Four) Hours As Needed for Moderate Pain ., Disp: 150 tablet, Rfl: 0  •  promethazine (PHENERGAN) 25 MG tablet, Take 25 mg by mouth Every 6 (Six) Hours As Needed for Nausea or Vomiting., Disp: , Rfl:   •  simvastatin (ZOCOR) 20 MG tablet, Take 1 tablet by mouth Every Night., Disp: 90 tablet, Rfl: 3  •  SITagliptin (JANUVIA) 100 MG tablet, Take 1 tablet by mouth Daily., Disp: 90 tablet, Rfl: 1  •  vitamin D (ERGOCALCIFEROL) 1.25 MG (69395 UT) capsule capsule, TAKE ONE CAPSULE BY MOUTH ONCE WEEKLY, Disp: 4 capsule, Rfl: 3    Current Facility-Administered Medications:   •  cyanocobalamin injection 1,000 mcg, 1,000 mcg, Intramuscular, Q28 Days, Aleah Morales MD, 1,000 mcg at 10/24/19 1716    Allergies:   Allergies   Allergen Reactions   • Valium [Diazepam]        Objective     Physical Exam:  Vital Signs:   Vitals:    11/13/19 0919   BP: 112/80   BP Location: Right arm   Patient Position: Sitting   Pulse: 75   Temp: 97.6 °F (36.4 °C)   SpO2: 96%  Comment: resting at room air   Weight: 64.9 kg (143 lb)   Height: 132.1 cm (52\")       Physical Exam   Constitutional: She is oriented to person, " place, and time. She appears well-developed and well-nourished. No distress.   HENT:   Head: Normocephalic and atraumatic.   Nose: Nose normal.   Mouth/Throat: Oropharynx is clear and moist. No oropharyngeal exudate.   Thrush: None  Mallampati Score: 3    Eyes: EOM are normal. Pupils are equal, round, and reactive to light. Right eye exhibits no discharge. Left eye exhibits no discharge. No scleral icterus.   Neck: Neck supple. No tracheal deviation present. No thyromegaly present.   Cardiovascular: Normal rate, regular rhythm and normal heart sounds. Exam reveals no friction rub.   No murmur heard.  Pulmonary/Chest: Effort normal and breath sounds normal. No stridor. No respiratory distress. She has no wheezes. She has no rales.   Abdominal: Soft. She exhibits no distension. There is no tenderness.   Musculoskeletal: She exhibits no edema or tenderness.   Clubbing: None   Lymphadenopathy:     She has no cervical adenopathy.   Neurological: She is alert and oriented to person, place, and time. No cranial nerve deficit. Coordination normal.   Skin: She is not diaphoretic.   Psychiatric: Her behavior is normal. Judgment and thought content normal.   Depressed affect, crying intermittently   Nursing note and vitals reviewed.             Results Review:   I reviewed the patient's new imaging results and agree with the interpretation.    CXR reviewed and showed chronic changes and no acute findings noted.     PFT IMPRESSION:   1. Available data suggests normal spiromety.    2. No significant bronchodilator response, but this does not preclude their clinical use.  3. Lung volume reveals normal.       4. Airway resistance is mildly elevated.  5. DLCO is borderline low.      Assessment / Plan      Assessment:   Problem List Items Addressed This Visit        Respiratory    SIENA (obstructive sleep apnea)      Other Visit Diagnoses     Asthma, unspecified asthma severity, unspecified whether complicated, unspecified whether  persistent    -  Primary    Relevant Medications    albuterol sulfate HFA (PROVENTIL HFA;VENTOLIN HFA;PROAIR HFA) inhaler 4 puff (Completed)    Other Relevant Orders    Pulmonary Function Test (Completed)    Bronchial Challenge With Methacholine    IgE    Aspergillus Fumigatus IgE    Aspergillus Antibodies    Shortness of breath        Relevant Orders    Bronchial Challenge With Methacholine    BNP    Obesity (BMI 30-39.9)              Plan:   1.  Patient presenting with new onset respiratory complaints.  There is some history of wheezing mentioned.  Denies any persistent cough or chest tightness.  Does not fit with bronchial asthma picture at this point.  Did discuss in detail about my impression with her.  However, her albuterol inhaler is helping her some.  She can continue using albuterol inhaler.  We will get her back to do methacholine challenge test to make sure not missing any other pathology here.  I will get IgE level, Aspergillus antibodies and brain natriuretic peptide level as well.  Denies any cardiac history either.    2.  Continue Zyrtec for antiallergic measures.  Flonase nasal spray to try to get control of nasal bogginess and inflammation.  Get off montelukast to due to severe depression and see if any of the allergy symptoms change.  If unchanged, then stay off of montelukast at this point.    3.  Patient is severely depressed.  That might as well be affecting her respiratory symptoms.  She was encouraged to follow-up with primary care and may get psychology referral.  She apparently used to see a psychologist at  but she stopped going there.  I think it will benefit her.  Patient denies any suicidal ideation or suicide plans at this point.  She was encouraged to talk to us if she ever have these thoughts.    4.  Sleep apnea treatment would help her in the long run as well.  Strongly encouraged to talk to the DME company to see if she can get a better mask fit so that she can use the device.   Importance of treatment of sleep apnea reviewed with the patient.    Thank you for allowing us to participate in the care of this patient.  We will follow her in 5 to 6 weeks time with methacholine challenge test.    An hour of time spent in visit and more than 50% time spent in face to face counseling, reviewing things, medication proper use, side effects of medications and counseling regarding depressive symptoms.     Follow Up:   Return in about 6 weeks (around 12/25/2019).    Discussed plan of care in detail with patient today. Patient verbally understands and agrees.        Ruben Bazan MD  Pulmonary Critical Care and Sleep Medicine      Please note that portions of this note may have been completed with a voice recognition program. Efforts were made to edit the dictations, but occasionally words are mistranscribed.

## 2019-11-14 LAB — NT-PROBNP SERPL-MCNC: 440.9 PG/ML (ref 5–900)

## 2019-11-17 LAB
A FLAVUS AB SER QL ID: NEGATIVE
A FUMIGATUS AB SER QL ID: NEGATIVE
A FUMIGATUS IGE QN: <0.1 KU/L
A NIGER AB SER QL ID: NEGATIVE
TOTAL IGE SMQN RAST: 9 IU/ML (ref 6–495)

## 2019-11-20 NOTE — TELEPHONE ENCOUNTER
Per Bharti her CT was denied and she was aware and received a letter of denial.  The CT did not meet guidlines

## 2019-11-21 ENCOUNTER — TELEPHONE (OUTPATIENT)
Dept: ENDOCRINOLOGY | Facility: CLINIC | Age: 64
End: 2019-11-21

## 2019-11-21 NOTE — TELEPHONE ENCOUNTER
"This message came through the HUB for Dr Morales - I wanted to make sure that this got forwarded to her.    Voice Count: [phone]  Voice message from WIRELESS CALLER [phone] received 08:39 AM 11/20/2019      \"Yes this is Karen Lozano and I'm a patient of Dr. Aleah Morales and I was trying to reach Keke her nurse to see if she could her know that I need to have some medicine called in. 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 So if they could call me back or either call me something and I would appreciate somebody to call me back. In order what to take or if they're gonna call me something in my phone no. is [phone] and I appreciate if I could hear back today it's very important. I've been pretty sick and my name is Karen. Thank you so much have a blessed day.\"      "

## 2019-11-21 NOTE — TELEPHONE ENCOUNTER
Please call her and ask her to come to the urgent care to be evaluated so correct medicine is given. DOes she have a fever? It could be flu and tx is different then. ALso ask her to double up on her steroid dose for stress coverage.

## 2019-11-26 RX ORDER — FLUTICASONE PROPIONATE 50 MCG
SPRAY, SUSPENSION (ML) NASAL
Qty: 16 ML | Refills: 0 | OUTPATIENT
Start: 2019-11-26

## 2019-11-30 ENCOUNTER — HOSPITAL ENCOUNTER (INPATIENT)
Facility: HOSPITAL | Age: 64
LOS: 3 days | Discharge: HOME | End: 2019-12-03
Attending: EMERGENCY MEDICINE | Admitting: INTERNAL MEDICINE

## 2019-11-30 ENCOUNTER — APPOINTMENT (OUTPATIENT)
Dept: CT IMAGING | Facility: HOSPITAL | Age: 64
End: 2019-11-30

## 2019-11-30 DIAGNOSIS — E11.65 TYPE 2 DIABETES MELLITUS WITH HYPERGLYCEMIA, UNSPECIFIED WHETHER LONG TERM INSULIN USE (HCC): ICD-10-CM

## 2019-11-30 DIAGNOSIS — Z85.43 HISTORY OF OVARIAN CANCER: ICD-10-CM

## 2019-11-30 DIAGNOSIS — Z86.39 HISTORY OF ADDISON'S DISEASE: ICD-10-CM

## 2019-11-30 DIAGNOSIS — R91.8 PULMONARY NODULES: ICD-10-CM

## 2019-11-30 DIAGNOSIS — R19.7 NAUSEA, VOMITING, AND DIARRHEA: Primary | ICD-10-CM

## 2019-11-30 DIAGNOSIS — R11.2 NAUSEA, VOMITING, AND DIARRHEA: Primary | ICD-10-CM

## 2019-11-30 LAB
ALBUMIN SERPL-MCNC: 4.2 G/DL (ref 3.5–5.2)
ALBUMIN/GLOB SERPL: 1.4 G/DL
ALP SERPL-CCNC: 85 U/L (ref 39–117)
ALT SERPL W P-5'-P-CCNC: 14 U/L (ref 1–33)
ANION GAP SERPL CALCULATED.3IONS-SCNC: 15 MMOL/L (ref 5–15)
AST SERPL-CCNC: 25 U/L (ref 1–32)
BACTERIA UR QL AUTO: ABNORMAL /HPF
BASOPHILS # BLD AUTO: 0.05 10*3/MM3 (ref 0–0.2)
BASOPHILS NFR BLD AUTO: 0.4 % (ref 0–1.5)
BILIRUB SERPL-MCNC: 0.7 MG/DL (ref 0.2–1.2)
BILIRUB UR QL STRIP: NEGATIVE
BUN BLD-MCNC: 7 MG/DL (ref 8–23)
BUN/CREAT SERPL: 7.4 (ref 7–25)
CALCIUM SPEC-SCNC: 10 MG/DL (ref 8.6–10.5)
CHLORIDE SERPL-SCNC: 103 MMOL/L (ref 98–107)
CLARITY UR: CLEAR
CO2 SERPL-SCNC: 25 MMOL/L (ref 22–29)
COLOR UR: YELLOW
CREAT BLD-MCNC: 0.94 MG/DL (ref 0.57–1)
D-LACTATE SERPL-SCNC: 2.9 MMOL/L (ref 0.5–2)
DEPRECATED RDW RBC AUTO: 37.8 FL (ref 37–54)
EOSINOPHIL # BLD AUTO: 0.37 10*3/MM3 (ref 0–0.4)
EOSINOPHIL NFR BLD AUTO: 2.7 % (ref 0.3–6.2)
ERYTHROCYTE [DISTWIDTH] IN BLOOD BY AUTOMATED COUNT: 12 % (ref 12.3–15.4)
GFR SERPL CREATININE-BSD FRML MDRD: 60 ML/MIN/1.73
GLOBULIN UR ELPH-MCNC: 3 GM/DL
GLUCOSE BLD-MCNC: 164 MG/DL (ref 65–99)
GLUCOSE UR STRIP-MCNC: NEGATIVE MG/DL
HCT VFR BLD AUTO: 42 % (ref 34–46.6)
HGB BLD-MCNC: 14 G/DL (ref 12–15.9)
HGB UR QL STRIP.AUTO: NEGATIVE
HOLD SPECIMEN: NORMAL
HYALINE CASTS UR QL AUTO: ABNORMAL /LPF
IMM GRANULOCYTES # BLD AUTO: 0.12 10*3/MM3 (ref 0–0.05)
IMM GRANULOCYTES NFR BLD AUTO: 0.9 % (ref 0–0.5)
KETONES UR QL STRIP: ABNORMAL
LEUKOCYTE ESTERASE UR QL STRIP.AUTO: ABNORMAL
LIPASE SERPL-CCNC: 17 U/L (ref 13–60)
LYMPHOCYTES # BLD AUTO: 2.26 10*3/MM3 (ref 0.7–3.1)
LYMPHOCYTES NFR BLD AUTO: 16.2 % (ref 19.6–45.3)
MCH RBC QN AUTO: 28.8 PG (ref 26.6–33)
MCHC RBC AUTO-ENTMCNC: 33.3 G/DL (ref 31.5–35.7)
MCV RBC AUTO: 86.4 FL (ref 79–97)
MONOCYTES # BLD AUTO: 1.12 10*3/MM3 (ref 0.1–0.9)
MONOCYTES NFR BLD AUTO: 8 % (ref 5–12)
MUCOUS THREADS URNS QL MICRO: ABNORMAL /HPF
NEUTROPHILS # BLD AUTO: 10.02 10*3/MM3 (ref 1.7–7)
NEUTROPHILS NFR BLD AUTO: 71.8 % (ref 42.7–76)
NITRITE UR QL STRIP: NEGATIVE
NRBC BLD AUTO-RTO: 0 /100 WBC (ref 0–0.2)
PH UR STRIP.AUTO: 5.5 [PH] (ref 5–8)
PLATELET # BLD AUTO: 221 10*3/MM3 (ref 140–450)
PMV BLD AUTO: 9.1 FL (ref 6–12)
POTASSIUM BLD-SCNC: 4.2 MMOL/L (ref 3.5–5.2)
PROT SERPL-MCNC: 7.2 G/DL (ref 6–8.5)
PROT UR QL STRIP: NEGATIVE
RBC # BLD AUTO: 4.86 10*6/MM3 (ref 3.77–5.28)
RBC # UR: ABNORMAL /HPF
REF LAB TEST METHOD: ABNORMAL
SODIUM BLD-SCNC: 143 MMOL/L (ref 136–145)
SP GR UR STRIP: 1.02 (ref 1–1.03)
SQUAMOUS #/AREA URNS HPF: ABNORMAL /HPF
UROBILINOGEN UR QL STRIP: ABNORMAL
WBC NRBC COR # BLD: 13.94 10*3/MM3 (ref 3.4–10.8)
WBC UR QL AUTO: ABNORMAL /HPF
WHOLE BLOOD HOLD SPECIMEN: NORMAL
WHOLE BLOOD HOLD SPECIMEN: NORMAL

## 2019-11-30 PROCEDURE — 87798 DETECT AGENT NOS DNA AMP: CPT | Performed by: EMERGENCY MEDICINE

## 2019-11-30 PROCEDURE — 36415 COLL VENOUS BLD VENIPUNCTURE: CPT

## 2019-11-30 PROCEDURE — 74176 CT ABD & PELVIS W/O CONTRAST: CPT

## 2019-11-30 PROCEDURE — 83605 ASSAY OF LACTIC ACID: CPT | Performed by: EMERGENCY MEDICINE

## 2019-11-30 PROCEDURE — 87086 URINE CULTURE/COLONY COUNT: CPT | Performed by: INTERNAL MEDICINE

## 2019-11-30 PROCEDURE — 25010000002 KETOROLAC TROMETHAMINE PER 15 MG: Performed by: EMERGENCY MEDICINE

## 2019-11-30 PROCEDURE — 25010000002 ONDANSETRON PER 1 MG: Performed by: EMERGENCY MEDICINE

## 2019-11-30 PROCEDURE — P9612 CATHETERIZE FOR URINE SPEC: HCPCS

## 2019-11-30 PROCEDURE — 99284 EMERGENCY DEPT VISIT MOD MDM: CPT

## 2019-11-30 PROCEDURE — 81001 URINALYSIS AUTO W/SCOPE: CPT | Performed by: EMERGENCY MEDICINE

## 2019-11-30 PROCEDURE — 80053 COMPREHEN METABOLIC PANEL: CPT | Performed by: EMERGENCY MEDICINE

## 2019-11-30 PROCEDURE — 25010000002 DICYCLOMINE PER 20 MG: Performed by: EMERGENCY MEDICINE

## 2019-11-30 PROCEDURE — 83690 ASSAY OF LIPASE: CPT | Performed by: EMERGENCY MEDICINE

## 2019-11-30 PROCEDURE — 25010000002 HYDROCORTISONE SODIUM SUCCINATE 100 MG RECONSTITUTED SOLUTION: Performed by: EMERGENCY MEDICINE

## 2019-11-30 PROCEDURE — 85025 COMPLETE CBC W/AUTO DIFF WBC: CPT | Performed by: EMERGENCY MEDICINE

## 2019-11-30 RX ORDER — ONDANSETRON 2 MG/ML
4 INJECTION INTRAMUSCULAR; INTRAVENOUS ONCE
Status: COMPLETED | OUTPATIENT
Start: 2019-11-30 | End: 2019-11-30

## 2019-11-30 RX ORDER — FAMOTIDINE 10 MG/ML
20 INJECTION, SOLUTION INTRAVENOUS ONCE
Status: COMPLETED | OUTPATIENT
Start: 2019-11-30 | End: 2019-11-30

## 2019-11-30 RX ORDER — KETOROLAC TROMETHAMINE 15 MG/ML
10 INJECTION, SOLUTION INTRAMUSCULAR; INTRAVENOUS ONCE
Status: COMPLETED | OUTPATIENT
Start: 2019-11-30 | End: 2019-11-30

## 2019-11-30 RX ORDER — SODIUM CHLORIDE 0.9 % (FLUSH) 0.9 %
10 SYRINGE (ML) INJECTION AS NEEDED
Status: DISCONTINUED | OUTPATIENT
Start: 2019-11-30 | End: 2019-12-03 | Stop reason: HOSPADM

## 2019-11-30 RX ORDER — ONDANSETRON 8 MG/1
8 TABLET, ORALLY DISINTEGRATING ORAL EVERY 8 HOURS PRN
Qty: 15 TABLET | Refills: 0 | Status: SHIPPED | OUTPATIENT
Start: 2019-11-30 | End: 2019-12-03 | Stop reason: HOSPADM

## 2019-11-30 RX ORDER — FAMOTIDINE 20 MG/1
20 TABLET, FILM COATED ORAL 2 TIMES DAILY
Qty: 14 TABLET | Refills: 0 | Status: SHIPPED | OUTPATIENT
Start: 2019-11-30 | End: 2019-12-03 | Stop reason: HOSPADM

## 2019-11-30 RX ORDER — DICYCLOMINE HYDROCHLORIDE 10 MG/ML
20 INJECTION INTRAMUSCULAR ONCE
Status: COMPLETED | OUTPATIENT
Start: 2019-11-30 | End: 2019-11-30

## 2019-11-30 RX ADMIN — ONDANSETRON 4 MG: 2 INJECTION INTRAMUSCULAR; INTRAVENOUS at 19:53

## 2019-11-30 RX ADMIN — KETOROLAC TROMETHAMINE 10 MG: 15 INJECTION, SOLUTION INTRAMUSCULAR; INTRAVENOUS at 16:45

## 2019-11-30 RX ADMIN — SODIUM CHLORIDE 500 ML: 9 INJECTION, SOLUTION INTRAVENOUS at 21:47

## 2019-11-30 RX ADMIN — ONDANSETRON 4 MG: 2 INJECTION INTRAMUSCULAR; INTRAVENOUS at 16:43

## 2019-11-30 RX ADMIN — SODIUM CHLORIDE, POTASSIUM CHLORIDE, SODIUM LACTATE AND CALCIUM CHLORIDE 1000 ML: 600; 310; 30; 20 INJECTION, SOLUTION INTRAVENOUS at 16:41

## 2019-11-30 RX ADMIN — FAMOTIDINE 20 MG: 10 INJECTION, SOLUTION INTRAVENOUS at 16:43

## 2019-11-30 RX ADMIN — DICYCLOMINE HYDROCHLORIDE 20 MG: 20 INJECTION INTRAMUSCULAR at 16:47

## 2019-11-30 RX ADMIN — HYDROCORTISONE SODIUM SUCCINATE 100 MG: 100 INJECTION, POWDER, FOR SOLUTION INTRAMUSCULAR; INTRAVENOUS at 21:48

## 2019-12-01 ENCOUNTER — APPOINTMENT (OUTPATIENT)
Dept: CARDIOLOGY | Facility: HOSPITAL | Age: 64
End: 2019-12-01

## 2019-12-01 PROBLEM — A41.9 SEPSIS: Status: ACTIVE | Noted: 2019-12-01

## 2019-12-01 PROBLEM — N39.0 UTI (URINARY TRACT INFECTION), BACTERIAL: Status: ACTIVE | Noted: 2019-12-01

## 2019-12-01 PROBLEM — R91.1 PULMONARY NODULE: Status: ACTIVE | Noted: 2019-12-01

## 2019-12-01 PROBLEM — A49.9 UTI (URINARY TRACT INFECTION), BACTERIAL: Status: ACTIVE | Noted: 2019-12-01

## 2019-12-01 PROBLEM — E87.20 LACTIC ACIDOSIS: Status: ACTIVE | Noted: 2019-12-01

## 2019-12-01 PROBLEM — I31.39 PERICARDIAL EFFUSION: Status: ACTIVE | Noted: 2019-12-01

## 2019-12-01 PROBLEM — E27.1 ADDISON'S DISEASE: Status: ACTIVE | Noted: 2019-12-01

## 2019-12-01 LAB
ALBUMIN SERPL-MCNC: 3.3 G/DL (ref 3.5–5.2)
ALBUMIN/GLOB SERPL: 1.3 G/DL
ALP SERPL-CCNC: 72 U/L (ref 39–117)
ALT SERPL W P-5'-P-CCNC: 12 U/L (ref 1–33)
ANION GAP SERPL CALCULATED.3IONS-SCNC: 12 MMOL/L (ref 5–15)
AST SERPL-CCNC: 21 U/L (ref 1–32)
BACTERIA UR QL AUTO: ABNORMAL /HPF
BASOPHILS # BLD AUTO: 0.02 10*3/MM3 (ref 0–0.2)
BASOPHILS NFR BLD AUTO: 0.2 % (ref 0–1.5)
BILIRUB SERPL-MCNC: 0.8 MG/DL (ref 0.2–1.2)
BILIRUB UR QL STRIP: NEGATIVE
BUN BLD-MCNC: 9 MG/DL (ref 8–23)
BUN/CREAT SERPL: 11.4 (ref 7–25)
CALCIUM SPEC-SCNC: 8.6 MG/DL (ref 8.6–10.5)
CHLORIDE SERPL-SCNC: 105 MMOL/L (ref 98–107)
CLARITY UR: ABNORMAL
CO2 SERPL-SCNC: 20 MMOL/L (ref 22–29)
COLOR UR: YELLOW
CREAT BLD-MCNC: 0.79 MG/DL (ref 0.57–1)
D-LACTATE SERPL-SCNC: 1.3 MMOL/L (ref 0.5–2)
DEPRECATED RDW RBC AUTO: 38.3 FL (ref 37–54)
EOSINOPHIL # BLD AUTO: 0.02 10*3/MM3 (ref 0–0.4)
EOSINOPHIL NFR BLD AUTO: 0.2 % (ref 0.3–6.2)
ERYTHROCYTE [DISTWIDTH] IN BLOOD BY AUTOMATED COUNT: 11.9 % (ref 12.3–15.4)
GFR SERPL CREATININE-BSD FRML MDRD: 73 ML/MIN/1.73
GLOBULIN UR ELPH-MCNC: 2.6 GM/DL
GLUCOSE BLD-MCNC: 203 MG/DL (ref 65–99)
GLUCOSE BLDC GLUCOMTR-MCNC: 155 MG/DL (ref 70–130)
GLUCOSE BLDC GLUCOMTR-MCNC: 161 MG/DL (ref 70–130)
GLUCOSE BLDC GLUCOMTR-MCNC: 189 MG/DL (ref 70–130)
GLUCOSE BLDC GLUCOMTR-MCNC: 216 MG/DL (ref 70–130)
GLUCOSE UR STRIP-MCNC: ABNORMAL MG/DL
HBA1C MFR BLD: 7.7 % (ref 4.8–5.6)
HCT VFR BLD AUTO: 37.4 % (ref 34–46.6)
HGB BLD-MCNC: 12.5 G/DL (ref 12–15.9)
HGB UR QL STRIP.AUTO: NEGATIVE
HYALINE CASTS UR QL AUTO: ABNORMAL /LPF
IMM GRANULOCYTES # BLD AUTO: 0.09 10*3/MM3 (ref 0–0.05)
IMM GRANULOCYTES NFR BLD AUTO: 0.9 % (ref 0–0.5)
KETONES UR QL STRIP: ABNORMAL
LEUKOCYTE ESTERASE UR QL STRIP.AUTO: ABNORMAL
LYMPHOCYTES # BLD AUTO: 0.47 10*3/MM3 (ref 0.7–3.1)
LYMPHOCYTES NFR BLD AUTO: 4.8 % (ref 19.6–45.3)
MCH RBC QN AUTO: 29.4 PG (ref 26.6–33)
MCHC RBC AUTO-ENTMCNC: 33.4 G/DL (ref 31.5–35.7)
MCV RBC AUTO: 88 FL (ref 79–97)
MONOCYTES # BLD AUTO: 0.19 10*3/MM3 (ref 0.1–0.9)
MONOCYTES NFR BLD AUTO: 1.9 % (ref 5–12)
NEUTROPHILS # BLD AUTO: 8.98 10*3/MM3 (ref 1.7–7)
NEUTROPHILS NFR BLD AUTO: 92 % (ref 42.7–76)
NITRITE UR QL STRIP: NEGATIVE
NRBC BLD AUTO-RTO: 0 /100 WBC (ref 0–0.2)
PH UR STRIP.AUTO: <=5 [PH] (ref 5–8)
PLATELET # BLD AUTO: 175 10*3/MM3 (ref 140–450)
PMV BLD AUTO: 9 FL (ref 6–12)
POTASSIUM BLD-SCNC: 4.2 MMOL/L (ref 3.5–5.2)
PROT SERPL-MCNC: 5.9 G/DL (ref 6–8.5)
PROT UR QL STRIP: NEGATIVE
RBC # BLD AUTO: 4.25 10*6/MM3 (ref 3.77–5.28)
RBC # UR: ABNORMAL /HPF
REF LAB TEST METHOD: ABNORMAL
SODIUM BLD-SCNC: 137 MMOL/L (ref 136–145)
SP GR UR STRIP: 1.01 (ref 1–1.03)
SQUAMOUS #/AREA URNS HPF: ABNORMAL /HPF
TSH SERPL DL<=0.05 MIU/L-ACNC: 0.81 UIU/ML (ref 0.27–4.2)
UROBILINOGEN UR QL STRIP: ABNORMAL
WBC NRBC COR # BLD: 9.77 10*3/MM3 (ref 3.4–10.8)
WBC UR QL AUTO: ABNORMAL /HPF

## 2019-12-01 PROCEDURE — 83036 HEMOGLOBIN GLYCOSYLATED A1C: CPT | Performed by: NURSE PRACTITIONER

## 2019-12-01 PROCEDURE — 87186 SC STD MICRODIL/AGAR DIL: CPT | Performed by: NURSE PRACTITIONER

## 2019-12-01 PROCEDURE — 25010000002 HYDROCORTISONE SODIUM SUCCINATE 100 MG RECONSTITUTED SOLUTION: Performed by: INTERNAL MEDICINE

## 2019-12-01 PROCEDURE — 87077 CULTURE AEROBIC IDENTIFY: CPT | Performed by: NURSE PRACTITIONER

## 2019-12-01 PROCEDURE — 85025 COMPLETE CBC W/AUTO DIFF WBC: CPT | Performed by: NURSE PRACTITIONER

## 2019-12-01 PROCEDURE — 25010000002 MEROPENEM PER 100 MG

## 2019-12-01 PROCEDURE — 82962 GLUCOSE BLOOD TEST: CPT

## 2019-12-01 PROCEDURE — 87086 URINE CULTURE/COLONY COUNT: CPT | Performed by: NURSE PRACTITIONER

## 2019-12-01 PROCEDURE — 93306 TTE W/DOPPLER COMPLETE: CPT

## 2019-12-01 PROCEDURE — 81001 URINALYSIS AUTO W/SCOPE: CPT | Performed by: NURSE PRACTITIONER

## 2019-12-01 PROCEDURE — 25010000002 CEFTRIAXONE PER 250 MG: Performed by: INTERNAL MEDICINE

## 2019-12-01 PROCEDURE — 83605 ASSAY OF LACTIC ACID: CPT | Performed by: NURSE PRACTITIONER

## 2019-12-01 PROCEDURE — 25010000002 MEROPENEM PER 100 MG: Performed by: INTERNAL MEDICINE

## 2019-12-01 PROCEDURE — 25010000002 HEPARIN (PORCINE) PER 1000 UNITS: Performed by: NURSE PRACTITIONER

## 2019-12-01 PROCEDURE — 87040 BLOOD CULTURE FOR BACTERIA: CPT | Performed by: NURSE PRACTITIONER

## 2019-12-01 PROCEDURE — 94640 AIRWAY INHALATION TREATMENT: CPT

## 2019-12-01 PROCEDURE — 99223 1ST HOSP IP/OBS HIGH 75: CPT | Performed by: INTERNAL MEDICINE

## 2019-12-01 PROCEDURE — 63710000001 INSULIN LISPRO (HUMAN) PER 5 UNITS: Performed by: NURSE PRACTITIONER

## 2019-12-01 PROCEDURE — 80053 COMPREHEN METABOLIC PANEL: CPT | Performed by: NURSE PRACTITIONER

## 2019-12-01 PROCEDURE — 84443 ASSAY THYROID STIM HORMONE: CPT | Performed by: INTERNAL MEDICINE

## 2019-12-01 RX ORDER — SODIUM CHLORIDE 9 MG/ML
100 INJECTION, SOLUTION INTRAVENOUS CONTINUOUS
Status: DISCONTINUED | OUTPATIENT
Start: 2019-12-01 | End: 2019-12-03 | Stop reason: HOSPADM

## 2019-12-01 RX ORDER — OXYCODONE AND ACETAMINOPHEN 10; 325 MG/1; MG/1
1 TABLET ORAL EVERY 4 HOURS PRN
Status: DISCONTINUED | OUTPATIENT
Start: 2019-12-01 | End: 2019-12-03 | Stop reason: HOSPADM

## 2019-12-01 RX ORDER — HYDROCORTISONE 5 MG/1
5 TABLET ORAL EVERY EVENING
Status: DISCONTINUED | OUTPATIENT
Start: 2019-12-01 | End: 2019-12-01

## 2019-12-01 RX ORDER — ACETAMINOPHEN 160 MG/5ML
650 SOLUTION ORAL EVERY 4 HOURS PRN
Status: DISCONTINUED | OUTPATIENT
Start: 2019-12-01 | End: 2019-12-03 | Stop reason: HOSPADM

## 2019-12-01 RX ORDER — ACETAMINOPHEN 650 MG/1
650 SUPPOSITORY RECTAL EVERY 4 HOURS PRN
Status: DISCONTINUED | OUTPATIENT
Start: 2019-12-01 | End: 2019-12-03 | Stop reason: HOSPADM

## 2019-12-01 RX ORDER — ONDANSETRON 2 MG/ML
4 INJECTION INTRAMUSCULAR; INTRAVENOUS EVERY 6 HOURS PRN
Status: DISCONTINUED | OUTPATIENT
Start: 2019-12-01 | End: 2019-12-03 | Stop reason: HOSPADM

## 2019-12-01 RX ORDER — SODIUM CHLORIDE 0.9 % (FLUSH) 0.9 %
10 SYRINGE (ML) INJECTION AS NEEDED
Status: DISCONTINUED | OUTPATIENT
Start: 2019-12-01 | End: 2019-12-03 | Stop reason: HOSPADM

## 2019-12-01 RX ORDER — CETIRIZINE HYDROCHLORIDE 10 MG/1
10 TABLET ORAL DAILY
Status: DISCONTINUED | OUTPATIENT
Start: 2019-12-01 | End: 2019-12-03 | Stop reason: HOSPADM

## 2019-12-01 RX ORDER — ACETAMINOPHEN 325 MG/1
650 TABLET ORAL EVERY 4 HOURS PRN
Status: DISCONTINUED | OUTPATIENT
Start: 2019-12-01 | End: 2019-12-03 | Stop reason: HOSPADM

## 2019-12-01 RX ORDER — FLUTICASONE PROPIONATE 50 MCG
1 SPRAY, SUSPENSION (ML) NASAL DAILY
Status: DISCONTINUED | OUTPATIENT
Start: 2019-12-01 | End: 2019-12-03 | Stop reason: HOSPADM

## 2019-12-01 RX ORDER — SODIUM CHLORIDE 0.9 % (FLUSH) 0.9 %
10 SYRINGE (ML) INJECTION EVERY 12 HOURS SCHEDULED
Status: DISCONTINUED | OUTPATIENT
Start: 2019-12-01 | End: 2019-12-03 | Stop reason: HOSPADM

## 2019-12-01 RX ORDER — ALBUTEROL SULFATE 2.5 MG/3ML
2.5 SOLUTION RESPIRATORY (INHALATION) EVERY 4 HOURS PRN
Status: DISCONTINUED | OUTPATIENT
Start: 2019-12-01 | End: 2019-12-03 | Stop reason: HOSPADM

## 2019-12-01 RX ORDER — LEVOTHYROXINE SODIUM 0.05 MG/1
50 TABLET ORAL
Status: DISCONTINUED | OUTPATIENT
Start: 2019-12-01 | End: 2019-12-03 | Stop reason: HOSPADM

## 2019-12-01 RX ORDER — NICOTINE POLACRILEX 4 MG
15 LOZENGE BUCCAL
Status: DISCONTINUED | OUTPATIENT
Start: 2019-12-01 | End: 2019-12-03 | Stop reason: HOSPADM

## 2019-12-01 RX ORDER — HEPARIN SODIUM 5000 [USP'U]/ML
5000 INJECTION, SOLUTION INTRAVENOUS; SUBCUTANEOUS EVERY 8 HOURS SCHEDULED
Status: DISCONTINUED | OUTPATIENT
Start: 2019-12-01 | End: 2019-12-03 | Stop reason: HOSPADM

## 2019-12-01 RX ORDER — MONTELUKAST SODIUM 10 MG/1
10 TABLET ORAL NIGHTLY
Status: DISCONTINUED | OUTPATIENT
Start: 2019-12-01 | End: 2019-12-03 | Stop reason: HOSPADM

## 2019-12-01 RX ORDER — ALPRAZOLAM 1 MG/1
1 TABLET ORAL 4 TIMES DAILY
Status: DISCONTINUED | OUTPATIENT
Start: 2019-12-01 | End: 2019-12-02

## 2019-12-01 RX ORDER — DEXTROSE MONOHYDRATE 25 G/50ML
25 INJECTION, SOLUTION INTRAVENOUS
Status: DISCONTINUED | OUTPATIENT
Start: 2019-12-01 | End: 2019-12-03 | Stop reason: HOSPADM

## 2019-12-01 RX ORDER — GABAPENTIN 300 MG/1
300 CAPSULE ORAL NIGHTLY
Status: DISCONTINUED | OUTPATIENT
Start: 2019-12-01 | End: 2019-12-03 | Stop reason: HOSPADM

## 2019-12-01 RX ADMIN — LEVOTHYROXINE SODIUM 50 MCG: 50 TABLET ORAL at 06:24

## 2019-12-01 RX ADMIN — ALPRAZOLAM 1 MG: 1 TABLET ORAL at 20:57

## 2019-12-01 RX ADMIN — CETIRIZINE HYDROCHLORIDE 10 MG: 10 TABLET, FILM COATED ORAL at 08:32

## 2019-12-01 RX ADMIN — MEROPENEM 1 G: 1 INJECTION, POWDER, FOR SOLUTION INTRAVENOUS at 10:42

## 2019-12-01 RX ADMIN — HEPARIN SODIUM 5000 UNITS: 5000 INJECTION, SOLUTION INTRAVENOUS; SUBCUTANEOUS at 06:24

## 2019-12-01 RX ADMIN — SODIUM CHLORIDE, PRESERVATIVE FREE 10 ML: 5 INJECTION INTRAVENOUS at 20:57

## 2019-12-01 RX ADMIN — HYDROCORTISONE SODIUM SUCCINATE 50 MG: 100 INJECTION, POWDER, FOR SOLUTION INTRAMUSCULAR; INTRAVENOUS at 06:34

## 2019-12-01 RX ADMIN — SODIUM CHLORIDE 100 ML/HR: 9 INJECTION, SOLUTION INTRAVENOUS at 02:41

## 2019-12-01 RX ADMIN — FLUTICASONE PROPIONATE 1 SPRAY: 50 SPRAY, METERED NASAL at 08:31

## 2019-12-01 RX ADMIN — ALPRAZOLAM 1 MG: 1 TABLET ORAL at 17:37

## 2019-12-01 RX ADMIN — ALPRAZOLAM 1 MG: 1 TABLET ORAL at 08:31

## 2019-12-01 RX ADMIN — ALPRAZOLAM 1 MG: 1 TABLET ORAL at 11:26

## 2019-12-01 RX ADMIN — GABAPENTIN 300 MG: 300 CAPSULE ORAL at 20:57

## 2019-12-01 RX ADMIN — INSULIN LISPRO 2 UNITS: 100 INJECTION, SOLUTION INTRAVENOUS; SUBCUTANEOUS at 17:37

## 2019-12-01 RX ADMIN — CEFTRIAXONE 1 G: 1 INJECTION, POWDER, FOR SOLUTION INTRAMUSCULAR; INTRAVENOUS at 13:54

## 2019-12-01 RX ADMIN — ALBUTEROL SULFATE 2.5 MG: 2.5 SOLUTION RESPIRATORY (INHALATION) at 22:06

## 2019-12-01 RX ADMIN — MEROPENEM 1 G: 1 INJECTION, POWDER, FOR SOLUTION INTRAVENOUS at 04:28

## 2019-12-01 RX ADMIN — HYDROCORTISONE SODIUM SUCCINATE 50 MG: 100 INJECTION, POWDER, FOR SOLUTION INTRAMUSCULAR; INTRAVENOUS at 11:05

## 2019-12-01 RX ADMIN — MEROPENEM 1 G: 1 INJECTION, POWDER, FOR SOLUTION INTRAVENOUS at 08:46

## 2019-12-01 RX ADMIN — GABAPENTIN 300 MG: 300 CAPSULE ORAL at 02:41

## 2019-12-01 RX ADMIN — INSULIN LISPRO 3 UNITS: 100 INJECTION, SOLUTION INTRAVENOUS; SUBCUTANEOUS at 11:26

## 2019-12-01 RX ADMIN — SODIUM CHLORIDE, PRESERVATIVE FREE 10 ML: 5 INJECTION INTRAVENOUS at 08:32

## 2019-12-01 RX ADMIN — INSULIN LISPRO 2 UNITS: 100 INJECTION, SOLUTION INTRAVENOUS; SUBCUTANEOUS at 08:32

## 2019-12-01 RX ADMIN — HYDROCORTISONE SODIUM SUCCINATE 50 MG: 100 INJECTION, POWDER, FOR SOLUTION INTRAMUSCULAR; INTRAVENOUS at 17:37

## 2019-12-01 NOTE — ED NOTES
Pt request to see  doesn't want to go home with the way she is feeling      Jessica Reilly, RN  11/30/19 2054

## 2019-12-01 NOTE — PLAN OF CARE
Problem: Patient Care Overview  Goal: Plan of Care Review  Outcome: Ongoing (interventions implemented as appropriate)   12/01/19 0458   Coping/Psychosocial   Plan of Care Reviewed With patient   Plan of Care Review   Progress no change   OTHER   Outcome Summary Pt admitted to floor from ED. Fall precautions and spore precautions initiated. Minimal complaints of pain. Has had no nausea, vomiting, or diarrhea since reported episodes in ED. Pt's yadiel called unit and stated he started similar symptoms of nausea and vomiting after eating [place] as well. Will continue to monitor.       Problem: Fall Risk (Adult)  Goal: Identify Related Risk Factors and Signs and Symptoms  Outcome: Ongoing (interventions implemented as appropriate)   12/01/19 0458   Fall Risk (Adult)   Related Risk Factors (Fall Risk) culprit medication(s);fatigue/slow reaction;fear of falling;gait/mobility problems;history of falls;inadequate lighting;sensory deficits;environment unfamiliar   Signs and Symptoms (Fall Risk) presence of risk factors       Problem: Pain, Chronic (Adult)  Goal: Identify Related Risk Factors and Signs and Symptoms  Outcome: Ongoing (interventions implemented as appropriate)   12/01/19 0458   Pain, Chronic (Adult)   Related Risk Factors (Chronic Pain) disease process;nerve injury;pain control inadequate;physical disability   Signs and Symptoms (Chronic Pain) fatigue/weakness;sleep pattern change;verbalization of pain/discomfort for a prolonged time period

## 2019-12-01 NOTE — DISCHARGE INSTRUCTIONS
There were several very small nodules noted within your lungs.  These are nonspecific but need to be followed very closely by your doctor for further evaluation and monitoring.

## 2019-12-01 NOTE — H&P
[facility] Medicine Services  HISTORY AND PHYSICAL    Patient Name: Karen Rubio  : 1955  MRN: [id number]  Primary Care Physician: Rob Hernandez MD  Date of admission: 2019      Subjective   Subjective     Chief Complaint:  Karen Rubio is a 63 y/o female presenting with N,V,D, decreased appetite for 2 days.    HPI:  Karen Rubio is a 64 y.o. female presenting with N,V,D, decreased appetite for 2 days.  Patient states her symptoms started after she ate to burgers from [place].  This morning she woke with nausea, vomiting, diarrhea.  Patient denies fever, chills, sweats, abdominal pain, bloody stools, bloody emesis.  Patient's  ate burgers from [place] is having similar symptoms.  Patient has history of ovarian cancer, stomach cancer, cholecystectomy in the past.  She has a history of [place]'s disease, diabetes, asthma, anxiety, depression, hypothyroidism.    Review of Systems   Constitutional: Positive for appetite change and fatigue. Negative for chills, diaphoresis and fever.   HENT: Negative for congestion, rhinorrhea and sore throat.    Eyes: Negative.    Respiratory: Negative for cough, chest tightness, shortness of breath and wheezing.    Cardiovascular: Negative for chest pain, palpitations and leg swelling.   Gastrointestinal: Positive for abdominal pain, diarrhea, nausea and vomiting.   Endocrine: Negative.    Genitourinary: Negative for dysuria and flank pain.   Musculoskeletal: Negative.    Skin:        Tan skin from Wat[place]'s Disease   Allergic/Immunologic: Negative.    Neurological: Negative.    Hematological: Negative.    Psychiatric/Behavioral: Negative.           All other systems reviewed and are negative.     Personal History     Past Medical History:   Diagnosis Date   • Acute bronchitis    • Acute head trauma    • Arthritis    • Arthropathy of ankle and foot    • Arthropathy, multiple sites    • Asthma    • Cancer  (CMS/HCC)    • Chronic bronchitis (CMS/HCC)    • Chronic constipation    • Cough    • Depression    • Diabetes mellitus (CMS/HCC)    • Dysuria    • Enthesopathy of ankle/tarsus    • Facial injury    • Fibromyalgia    • Fracture of left fibula    • Gall stone    • GERD (gastroesophageal reflux disease)    • Gout    • Hammer toe    • Heart disease    • Hyperlipidemia    • Idiopathic peripheral neuropathy    • Joint pain of right hip on movement    • Lumbago    • Migraine    • Mixed sensory-motor polyneuropathy    • Myalgia and myositis    • Nausea and vomiting    • Osteoporosis    • Ovarian cancer (CMS/HCC)    • Ovarian cyst    • Overweight    • Pleurisy    • Pustule    • Rash     Last Impression: 09 Feb 2015  Likely contact dermatitis secondary to new clothing- rash has similarity to shingles but distribution does not correspond with shingles. Trial of topical steroid, sent in clobetasol. Refer to derm for f/u if no improvement or worsening of rash.   • Recurrent boils    • Sinusitis, acute    • Stress fracture    • Thyroid disorder    • Tibial plateau fracture, left    • Urinary incontinence    • UTI (urinary tract infection)    • Vitamin B12 deficiency      · Last Impression: 29 Jan 2015  Check labs as ordered. Gave pt vitamin b12 IM in office today. Will continue monthly shots to maintain level.  Phuong Almaguer (Internal Medicine)   • Vitamin D deficiency        Past Surgical History:   Procedure Laterality Date   • OTHER SURGICAL HISTORY      anastomosis of gallbladder   • OVARY SURGERY     • TUBAL ABDOMINAL LIGATION         Family History: family history includes Arthritis in an other family member; Cancer in an other family member; Heart attack in an other family member; Hyperlipidemia in her father, mother, and another family member; Hypertension in her father, mother, and another family member; Kidney disease in her sister; Mental illness in an other family member; Migraines in an other family member; Other  "in her son; Stroke in an other family member; Thyroid disease in her mother, sister, and son. Otherwise pertinent FHx was reviewed and unremarkable.     Social History:  reports that she has never smoked. She has never used smokeless tobacco. She reports that she does not drink alcohol or use drugs.  Social History     Social History Narrative   • Not on file       Medications:    Available home medication information reviewed.  Facility-Administered Medications Prior to Admission   Medication Dose Route Frequency Provider Last Rate Last Dose   • cyanocobalamin injection 1,000 mcg  1,000 mcg Intramuscular Q28 Days Aleah Moraels MD   1,000 mcg at 10/24/19 1716     Medications Prior to Admission   Medication Sig Dispense Refill Last Dose   • albuterol sulfate  (90 Base) MCG/ACT inhaler Inhale 2 puffs Every 4 (Four) Hours As Needed for Wheezing. 3.7 g 11 11/30/2019 at Unknown time   • ALPRAZolam (XANAX) 1 MG tablet Take 1 tablet by mouth 4 (Four) Times a Day. 120 tablet 3 Past Week at Unknown time   • B-D 3CC LUER-JELLY SYR 25GX1\" 25G X 1\" 3 ML misc USE AS DIRECTED FOR B12 INJECTIONS 4 each 0 Past Month at Unknown time   • cetirizine (zyrTEC) 10 MG tablet Take 1 tablet by mouth Daily. 30 tablet 11 11/29/2019 at Unknown time   • citalopram (CeleXA) 40 MG tablet Take 1 tablet by mouth Daily. 30 tablet 11 Taking   • cyanocobalamin 1000 MCG/ML injection INJECT 1ML EVERY 2 WEEKS AS DIRECTED 2 mL 3 Taking   • fluticasone (FLONASE) 50 MCG/ACT nasal spray 1 spray into the nostril(s) as directed by provider Daily. 16 mL 0 11/29/2019 at Unknown time   • gabapentin (NEURONTIN) 300 MG capsule    11/29/2019 at Unknown time   • hydrocortisone (CORTEF) 5 MG tablet TAKE 3 TABLETS BY MOUTH EVERY DAY IN THE MORNING AND ALSO TAKE 1 TABLET EVERY EVENING 360 tablet 1 11/29/2019 at Unknown time   • ibuprofen (ADVIL,MOTRIN) 600 MG tablet Take 1 tablet by mouth Every 6 (Six) Hours As Needed for Mild Pain . 90 tablet 5 Past Month at " Unknown time   • levothyroxine (SYNTHROID, LEVOTHROID) 50 MCG tablet Take 1 tablet by mouth Daily. 90 tablet 1 11/29/2019 at Unknown time   • montelukast (SINGULAIR) 10 MG tablet Take 1 tablet by mouth Daily. 30 tablet 11 11/29/2019 at Unknown time   • oxyCODONE-acetaminophen (PERCOCET)  MG per tablet Take 1 tablet by mouth Every 4 (Four) Hours As Needed for Moderate Pain . 150 tablet 0 Past Week at Unknown time   • simvastatin (ZOCOR) 20 MG tablet Take 1 tablet by mouth Every Night. 90 tablet 3 Taking   • SITagliptin (JANUVIA) 100 MG tablet Take 1 tablet by mouth Daily. 90 tablet 1 Past Week at Unknown time   • vitamin D (ERGOCALCIFEROL) 1.25 MG (17903 UT) capsule capsule TAKE ONE CAPSULE BY MOUTH ONCE WEEKLY 4 capsule 3 Past Week at Unknown time       Allergies   Allergen Reactions   • Valium [Diazepam]        Objective   Objective     Vital Signs:   Temp:  [98.6 °F (37 °C)-99.5 °F (37.5 °C)] 99.5 °F (37.5 °C)  Heart Rate:  [] 103  Resp:  [14-16] 16  BP: (119-173)/(60-87) 165/83        Physical Exam   Constitutional: She is oriented to person, place, and time. She appears well-developed.   HENT:   Head: Normocephalic and atraumatic.   Eyes: Pupils are equal, round, and reactive to light.   Neck: Neck supple. No JVD present.   Cardiovascular: Normal rate, regular rhythm, normal heart sounds and intact distal pulses. Exam reveals no gallop and no friction rub.   No murmur heard.  Pulmonary/Chest: Effort normal and breath sounds normal.   Abdominal: Soft. Bowel sounds are normal. There is no tenderness.   Musculoskeletal: Normal range of motion.   Neurological: She is alert and oriented to person, place, and time.   Skin: Skin is warm and dry.   Psychiatric: She has a normal mood and affect.   Vitals reviewed.         Results Reviewed:  I have personally reviewed current lab and radiology data.    Results from last 7 days   Lab Units 11/30/19  1631   WBC 10*3/mm3 13.94*   HEMOGLOBIN g/dL 14.0   HEMATOCRIT  % 42.0   PLATELETS 10*3/mm3 221     Results from last 7 days   Lab Units 11/30/19  1631   SODIUM mmol/L 143   POTASSIUM mmol/L 4.2   CHLORIDE mmol/L 103   CO2 mmol/L 25.0   BUN mg/dL 7*   CREATININE mg/dL 0.94   GLUCOSE mg/dL 164*   CALCIUM mg/dL 10.0   ALT (SGPT) U/L 14   AST (SGOT) U/L 25   LACTATE mmol/L 2.9*     Estimated Creatinine Clearance: 45.2 mL/min (by C-G formula based on SCr of 0.94 mg/dL).  Brief Urine Lab Results  (Last result in the past 365 days)      Color   Clarity   Blood   Leuk Est   Nitrite   Protein   CREAT   Urine HCG        11/30/19 2014 Yellow Clear Negative Small (1+) Negative Negative             Imaging Results (Last 24 Hours)     Procedure Component Value Units Date/Time    CT Abdomen Pelvis Without Contrast [[id number]] Collected:  11/30/19 1828     Updated:  11/30/19 1911    Narrative:       EXAMINATION: CT ABDOMEN/PELVIS WO CONTRAST - 11/30/2019      INDICATION: Left lower quadrant pain. Nausea, vomiting, and diarrhea.     TECHNIQUE: Unenhanced CT imaging of the abdomen and pelvis was performed  with additional coronal and sagittal reformatted imaging provided for  review.     The radiation dose reduction device was turned on for each scan per the  ALARA (As Low as Reasonably Achievable) protocol.     COMPARISON: None     FINDINGS: Visualized lower lungs are clear. Nonspecific 3 mm pulmonary  nodule noted within the left lower lobe. A pericardial effusion is  identified and incompletely imaged.     The liver is unremarkable in appearance. Spleen does not demonstrate  abnormality. The adrenal glands are unremarkable. The pancreas does not  demonstrate acute abnormality. The gallbladder has likely been  surgically removed. The kidneys do not demonstrate abnormality. There is  no evidence of hydronephrosis. There is no convincing evidence of  obstructive uropathy. Identified adjacent the bilateral ureters are  scattered calcifications favored to be within the ovarian vein. There is  no  convincing evidence of obstructive uropathy identified. Urinary  bladder is unremarkable in appearance. No pelvic mass identified. No  free fluid noted in the pelvis. The retroperitoneum demonstrates  advanced atherosclerotic calcifications of the abdominal aorta and its  branches. Thoracolumbar degenerative changes are identified. There is a  deformity of the L3 vertebral body with posterior extension of the  posterior cortex identified which appears likely related to chronic  deformity. There is no convincing evidence of fracture identified within  this region. Correlate with symptomatology. The left inferior pubic  ramus demonstrates a remote appearing fracture. The bony pelvis appears  intact otherwise. Diffuse osteopenia of the osseous structures is  identified. Subcutaneous air is identified within the left lower lateral  soft tissues. Stomach is unremarkable in appearance. The small bowel is  nondilated. No evidence of bowel obstruction is appreciated. No right  lower quadrant inflammation identified. The colon is unremarkable in  appearance. The descending colon and sigmoid colon are slightly  underdistended and otherwise unremarkable.       Impression:          No acute intra-abdominal or pelvic process is appreciated.     Chronic-appearing deformities of the lower lumbar spine as described  above which appear similar to the MRI of the lumbar spine performed  12/16/2016. There are advanced degenerative changes of the lumbar spine  noted.     Incompletely imaged pericardial effusion and bilateral lower lobe  subcentimeter pulmonary nodules, the largest measuring 7 mm within the  right lower lobe. Consider follow-up CT imaging of the chest for further  evaluation.     Subcutaneous air within the left lateral gluteal soft tissues which  likely relates to  possible medication injection. Correlate with  physical exam and patient history.     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 She will take metoprolol and lisinopril as we discussed at last appt. Check BP before giving 2nd dose and hold med if BP is less than 120/80. Continue to use clonidine prn if BP is greater than 180/90. F/U as scheduled with Dr Morales.  Impression: 01/26/2015 - Pt will continue current dose of metoprolol 50 mg in the morning and 25 mg in the evening. Increase Lisinopril 20 mg in morning and 20 mg in evening, with her metoprolol.    Also check BP 3 times a day, if BP is higher than 180/90, she will take Clonidine 0.1 mg 3 times a day. Keep log of BP and whether she takes the clonidine or not.    Follow up in 1 week and will plan to add additional medication at that time, pt is nervous about changing more than one med at a time.  Impression: 01/22/2015 - Pt will continue current dose of metoprolol 50 mg in the morning and 25 mg in the evening. Increase Lisinopril 20 mg in morning and 20 mg in evening, with her metoprolol.    Also check BP 3 times a day, if BP is higher than 180/90, she will take Clonidine 0.1 mg 3 times a day. Keep log of BP and whether she takes the clonidine or not.    Follow up in 1 week and will plan to add additional medication at that time.;          Sepsis, UTI  -Tachycardic, leukocytosis, source of infection, lactic acidosis  -Due to  Shawn's disease and concerns for immunosuppression, will cover with meropenem for now  -Urine sent for culture sensitivity  -Blood cultures pending  -Consider ID consult    Gastroenteritis  -Stool studies  -Clear liquid diet advance as tolerated  -normal saline at 100 mils per hour    Lactic acidosis  - mils per hour  -continue to trend lactic acid    Rich[place]'s Disease  -Given stress dose of Solu-Cortef 100 mg in ED.  We will continue Solu-Cortef 50 mg every 6 x2 days for now.  Based on clinical response can DC early and restart home meds as appropriate.  Holding home meds for now  -Monitor blood pressure, orthostatic check  -Normal saline at 100 mils per hour    DM  -Holding oral diabetic medication  -Accu-Chek before meals and at bedtime with sliding scale insulin at the mild correction scale  -A1c    Asthma  -Continue Singulair 10 mg daily  -Albuterol nebs as needed    Hypothyroidism  -Continue levothyroxine 50 mcg  Daily  -TSH    Pericardial effusion  -Incidental finding on CT  -Echo in a.m.    Pulmonary nodules  -Incidental finding on CT  -Recommend follow-up outpatient    Anxiety/Depression  -T citalopram 40 mg daily  -Continue alprazolam 1 mg 3 times daily as needed     DVT prophylaxis:  Heparin 5000 units sq q 8 hours and compression device    CODE STATUS:    Code Status and Medical Interventions:   Ordered at: 12/01/19 0020     Level Of Support Discussed With:    Patient     Code Status:    CPR     Medical Interventions (Level of Support Prior to Arrest):    Full       Admission Status:  I believe this patient meets INPATIENT status due to the need for care which can only be reasonably provided in an hospital settings due to UTI with possible gastroenteritis with Shawn's disease and other co-morbidities. As such, I feel patient’s risk for adverse outcomes and need for care warrant INPATIENT evaluation and predict the patient’s care encounter to likely last beyond 2 midnights.        Electronically  signed by REDD Tony, 12/01/19, 12:26 AM.      Attending   Admission Attestation       I have seen and examined the patient, performing an independent face-to-face diagnostic evaluation with plan of care reviewed and developed with the advanced practice clinician (APC).      Brief Summary Statement:   Karen Rubio is a 64 y.o. female with a PMH significant for Shawn's disease, history of ovarian cancer, hypothyroidism, diabetes mellitus, anxiety and depression who presents to the ED with complaints of nausea, vomiting and diarrhea.    Patient states that for the past 2 days she has felt poorly with nausea, vomiting and diarrhea.Today  her diarrhea has been so severe that she has had several episodes of bowel incontinence.  She describes the diarrhea as brown and watery.  Due to persistent symptoms she presented to the ED for further evaluation and care.  Additionally, patient states that for the past several months she has had increased fatigue and dyspnea on exertion.    Remainder of detailed HPI is as noted by APC and has been reviewed and/or edited by me for completeness.    Attending Physical Exam:  Constitutional: Awake, alert  Eyes: PERRLA, sclerae anicteric, no conjunctival injection  HENT: NCAT, mucous membranes moist  Neck: Supple, no thyromegaly, no lymphadenopathy, trachea midline  Respiratory: Clear to auscultation bilaterally, nonlabored respirations   Cardiovascular: RRR, no murmurs, rubs, or gallops, palpable pedal pulses bilaterally  Gastrointestinal: Positive bowel sounds, soft, nontender, nondistended  Musculoskeletal: No bilateral ankle edema, no clubbing or cyanosis to extremities  Psychiatric: Appropriate affect, cooperative  Neurologic: Oriented x 3, strength symmetric in all extremities, Cranial Nerves grossly intact to confrontation, speech clear  Skin: No rashes      Brief Assessment/Plan :  See detailed assessment and plan developed with APC which I have reviewed and/or  edited for completeness.    Electronically signed by Char Phelps DO, 12/01/19, 2:54 AM.

## 2019-12-01 NOTE — PLAN OF CARE
"Problem: Patient Care Overview  Goal: Plan of Care Review  Outcome: Ongoing (interventions implemented as appropriate)   12/01/19 1218   Coping/Psychosocial   Plan of Care Reviewed With patient   Plan of Care Review   Progress improving   OTHER   Outcome Summary VSS. Sinus tachycardia. Pt admitted that she ate [place] and had N/V and diarrhea thus after.  ate it too and also had same affect. Pt has had NO diarrhea or vomiting. She states that she \"feels fine\" but would like to know about the fluid a MD said she has around her heart. Will continue to monitor and assess.         "

## 2019-12-01 NOTE — PROGRESS NOTES
[facility] Medicine Services  ADMISSION FOLLOW-UP NOTE          Patient admitted after midnight, H&P by my partner performed earlier on today's date reviewed.  Interim findings, labs, and charting also reviewed.        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     Karen Rubio is a 63 y/o female with a PMH significant for Shawn's disease, history of ovarian cancer, hypothyroidism, diabetes mellitus, anxiety and depression who presents to the ED with complaints of nausea, vomiting and diarrhea. Found to have UTI.    ADDITIONAL PLAN:  - detailed assessment and plan form admission reviewed  - diarrhea has resolved. Will d/c spore precautions  - de-escalate meropenem to CTX, urine culture pending  - vitals remains stable, on stress does steroids. Plan to restart home medications  - pericardial effusion on CT scan, echo pending  - unclear etiology of SOA. Suspect partially related to untreated sleep apnea. Recommend trial of nasal mask.      Electronically signed by Kristin Ureña DO, 12/01/19, 12:48 PM.

## 2019-12-02 LAB
BACTERIA SPEC AEROBE CULT: NORMAL
BH CV ECHO MEAS - AO ROOT AREA (BSA CORRECTED): 1.9
BH CV ECHO MEAS - AO ROOT AREA: 6 CM^2
BH CV ECHO MEAS - AO ROOT DIAM: 2.8 CM
BH CV ECHO MEAS - BSA(HAYCOCK): 1.6 M^2
BH CV ECHO MEAS - BSA: 1.4 M^2
BH CV ECHO MEAS - BZI_BMI: 36.1 KILOGRAMS/M^2
BH CV ECHO MEAS - BZI_METRIC_HEIGHT: 132.1 CM
BH CV ECHO MEAS - BZI_METRIC_WEIGHT: 63.1 KG
BH CV ECHO MEAS - EDV(CUBED): 54.9 ML
BH CV ECHO MEAS - EDV(MOD-SP2): 40 ML
BH CV ECHO MEAS - EDV(MOD-SP4): 40 ML
BH CV ECHO MEAS - EDV(TEICH): 62 ML
BH CV ECHO MEAS - EF(CUBED): 74.6 %
BH CV ECHO MEAS - EF(MOD-BP): 71 %
BH CV ECHO MEAS - EF(MOD-SP2): 67.5 %
BH CV ECHO MEAS - EF(MOD-SP4): 77.5 %
BH CV ECHO MEAS - EF(TEICH): 67.3 %
BH CV ECHO MEAS - ESV(CUBED): 13.9 ML
BH CV ECHO MEAS - ESV(MOD-SP2): 13 ML
BH CV ECHO MEAS - ESV(MOD-SP4): 9 ML
BH CV ECHO MEAS - ESV(TEICH): 20.3 ML
BH CV ECHO MEAS - FS: 36.7 %
BH CV ECHO MEAS - IVS/LVPW: 1
BH CV ECHO MEAS - IVSD: 1 CM
BH CV ECHO MEAS - LA DIMENSION: 3.8 CM
BH CV ECHO MEAS - LA/AO: 1.4
BH CV ECHO MEAS - LAD MAJOR: 4.9 CM
BH CV ECHO MEAS - LV DIASTOLIC VOL/BSA (35-75): 27.7 ML/M^2
BH CV ECHO MEAS - LV MASS(C)D: 121.4 GRAMS
BH CV ECHO MEAS - LV MASS(C)DI: 84.2 GRAMS/M^2
BH CV ECHO MEAS - LV SYSTOLIC VOL/BSA (12-30): 6.2 ML/M^2
BH CV ECHO MEAS - LVIDD: 3.8 CM
BH CV ECHO MEAS - LVIDS: 2.4 CM
BH CV ECHO MEAS - LVLD AP2: 6.1 CM
BH CV ECHO MEAS - LVLD AP4: 5.9 CM
BH CV ECHO MEAS - LVLS AP2: 5.1 CM
BH CV ECHO MEAS - LVLS AP4: 4.2 CM
BH CV ECHO MEAS - LVPWD: 1 CM
BH CV ECHO MEAS - MV MAX PG: 9.7 MMHG
BH CV ECHO MEAS - MV MEAN PG: 5.6 MMHG
BH CV ECHO MEAS - MV V2 MAX: 155.3 CM/SEC
BH CV ECHO MEAS - MV V2 MEAN: 112.3 CM/SEC
BH CV ECHO MEAS - MV V2 VTI: 18.4 CM
BH CV ECHO MEAS - PA ACC SLOPE: 546 CM/SEC^2
BH CV ECHO MEAS - PA ACC TIME: 0.13 SEC
BH CV ECHO MEAS - PA PR(ACCEL): 18.8 MMHG
BH CV ECHO MEAS - SI(CUBED): 28.4 ML/M^2
BH CV ECHO MEAS - SI(MOD-SP2): 18.7 ML/M^2
BH CV ECHO MEAS - SI(MOD-SP4): 21.5 ML/M^2
BH CV ECHO MEAS - SI(TEICH): 28.9 ML/M^2
BH CV ECHO MEAS - SV(CUBED): 41 ML
BH CV ECHO MEAS - SV(MOD-SP2): 27 ML
BH CV ECHO MEAS - SV(MOD-SP4): 31 ML
BH CV ECHO MEAS - SV(TEICH): 41.7 ML
BH CV ECHO MEAS - TAPSE (>1.6): 2.2 CM2
BH CV VAS BP RIGHT ARM: NORMAL MMHG
BH CV XLRA - RV BASE: 4.4 CM
BH CV XLRA - RV LENGTH: 6.3 CM
BH CV XLRA - RV MID: 3.3 CM
BH CV XLRA - TDI S': 12.7 CM/SEC
GLUCOSE BLDC GLUCOMTR-MCNC: 168 MG/DL (ref 70–130)
GLUCOSE BLDC GLUCOMTR-MCNC: 217 MG/DL (ref 70–130)
GLUCOSE BLDC GLUCOMTR-MCNC: 264 MG/DL (ref 70–130)
GLUCOSE BLDC GLUCOMTR-MCNC: 286 MG/DL (ref 70–130)
LEFT ATRIUM VOLUME INDEX: 31.2 ML/M^2
LEFT ATRIUM VOLUME: 45 ML
MAXIMAL PREDICTED HEART RATE: 155 BPM
STRESS TARGET HR: 132 BPM

## 2019-12-02 PROCEDURE — 82962 GLUCOSE BLOOD TEST: CPT

## 2019-12-02 PROCEDURE — 25010000002 CEFTRIAXONE PER 250 MG: Performed by: INTERNAL MEDICINE

## 2019-12-02 PROCEDURE — 25010000002 HEPARIN (PORCINE) PER 1000 UNITS: Performed by: NURSE PRACTITIONER

## 2019-12-02 PROCEDURE — 99233 SBSQ HOSP IP/OBS HIGH 50: CPT | Performed by: INTERNAL MEDICINE

## 2019-12-02 PROCEDURE — 25010000002 HYDROCORTISONE SODIUM SUCCINATE 100 MG RECONSTITUTED SOLUTION: Performed by: INTERNAL MEDICINE

## 2019-12-02 RX ORDER — HYDROCORTISONE 5 MG/1
5 TABLET ORAL NIGHTLY
Status: DISCONTINUED | OUTPATIENT
Start: 2019-12-03 | End: 2019-12-03 | Stop reason: HOSPADM

## 2019-12-02 RX ORDER — ALPRAZOLAM 0.5 MG/1
0.5 TABLET ORAL 4 TIMES DAILY
Status: DISCONTINUED | OUTPATIENT
Start: 2019-12-02 | End: 2019-12-03 | Stop reason: HOSPADM

## 2019-12-02 RX ADMIN — HYDROCORTISONE SODIUM SUCCINATE 50 MG: 100 INJECTION, POWDER, FOR SOLUTION INTRAMUSCULAR; INTRAVENOUS at 06:05

## 2019-12-02 RX ADMIN — FLUTICASONE PROPIONATE 1 SPRAY: 50 SPRAY, METERED NASAL at 08:23

## 2019-12-02 RX ADMIN — LEVOTHYROXINE SODIUM 50 MCG: 50 TABLET ORAL at 06:05

## 2019-12-02 RX ADMIN — INSULIN LISPRO 4 UNITS: 100 INJECTION, SOLUTION INTRAVENOUS; SUBCUTANEOUS at 13:58

## 2019-12-02 RX ADMIN — INSULIN LISPRO 2 UNITS: 100 INJECTION, SOLUTION INTRAVENOUS; SUBCUTANEOUS at 18:18

## 2019-12-02 RX ADMIN — GABAPENTIN 300 MG: 300 CAPSULE ORAL at 22:18

## 2019-12-02 RX ADMIN — CETIRIZINE HYDROCHLORIDE 10 MG: 10 TABLET, FILM COATED ORAL at 08:24

## 2019-12-02 RX ADMIN — HYDROCORTISONE SODIUM SUCCINATE 50 MG: 100 INJECTION, POWDER, FOR SOLUTION INTRAMUSCULAR; INTRAVENOUS at 13:57

## 2019-12-02 RX ADMIN — INSULIN LISPRO 4 UNITS: 100 INJECTION, SOLUTION INTRAVENOUS; SUBCUTANEOUS at 22:23

## 2019-12-02 RX ADMIN — SODIUM CHLORIDE 100 ML/HR: 9 INJECTION, SOLUTION INTRAVENOUS at 00:37

## 2019-12-02 RX ADMIN — ALPRAZOLAM 0.5 MG: 0.5 TABLET ORAL at 22:18

## 2019-12-02 RX ADMIN — HYDROCORTISONE SODIUM SUCCINATE 50 MG: 100 INJECTION, POWDER, FOR SOLUTION INTRAMUSCULAR; INTRAVENOUS at 00:36

## 2019-12-02 RX ADMIN — HYDROCORTISONE SODIUM SUCCINATE 50 MG: 100 INJECTION, POWDER, FOR SOLUTION INTRAMUSCULAR; INTRAVENOUS at 18:19

## 2019-12-02 RX ADMIN — INSULIN LISPRO 3 UNITS: 100 INJECTION, SOLUTION INTRAVENOUS; SUBCUTANEOUS at 08:23

## 2019-12-02 RX ADMIN — ALPRAZOLAM 1 MG: 1 TABLET ORAL at 08:23

## 2019-12-02 RX ADMIN — CEFTRIAXONE 1 G: 1 INJECTION, POWDER, FOR SOLUTION INTRAMUSCULAR; INTRAVENOUS at 13:57

## 2019-12-02 RX ADMIN — HEPARIN SODIUM 5000 UNITS: 5000 INJECTION, SOLUTION INTRAVENOUS; SUBCUTANEOUS at 13:58

## 2019-12-02 RX ADMIN — SODIUM CHLORIDE 100 ML/HR: 9 INJECTION, SOLUTION INTRAVENOUS at 22:24

## 2019-12-02 NOTE — PROGRESS NOTES
Discharge Planning Assessment  [facility]     Patient Name: Karen Rubio  MRN: [id number]  Today's Date: 12/2/2019    Admit Date: 11/30/2019    Discharge Needs Assessment     Row Name 12/02/19 1555       Living Environment    Lives With  spouse    Name(s) of Who Lives With Patient  Bubba Myrick    Current Living Arrangements  home/apartment/condo    Primary Care Provided by  self;spouse/significant other    Provides Primary Care For  no one, unable/limited ability to care for self    Family Caregiver if Needed  spouse    Family Caregiver Names  Spouse, Bubba Valencialey    Quality of Family Relationships  supportive;involved;helpful    Able to Return to Prior Arrangements  yes    Living Arrangement Comments  Lives with spouse in house with steps       Resource/Environmental Concerns    Resource/Environmental Concerns  none    Transportation Concerns  car, none       Transition Planning    Patient/Family Anticipates Transition to  home    Patient/Family Anticipated Services at Transition  none    Transportation Anticipated  family or friend will provide       Discharge Needs Assessment    Readmission Within the Last 30 Days  no previous admission in last 30 days    Concerns to be Addressed  no discharge needs identified    Equipment Currently Used at Home  bipap/cpap;rollator;cane, straight;glucometer    Anticipated Changes Related to Illness  none    Equipment Needed After Discharge  none        Discharge Plan     Row Name 12/02/19 6046       Plan    Plan  Plan is home at discharge    Patient/Family in Agreement with Plan  yes    Plan Comments  Met with patient in the room for initial discharge planning.  Lives with spouse in split level duplex.  Steps are managed without difficulty.  PCP is Rob Kelly.  Requires assistance of spouse with IADL.  She has a CPAP, Rollator, cane and glucometer.  No home health involved.  Plan is home at discharge.  Following     Final Discharge Disposition Code  01 -  home or self-care        Destination      No service coordination in this encounter.      Durable Medical Equipment      No service coordination in this encounter.      Dialysis/Infusion      No service coordination in this encounter.      Home Medical Care      No service coordination in this encounter.      Therapy      No service coordination in this encounter.      Community Resources      No service coordination in this encounter.          Demographic Summary     Row Name 12/02/19 1554       General Information    Admission Type  inpatient    Arrived From  emergency department    Referral Source  admission list    Reason for Consult  discharge planning    Preferred Language  English        Functional Status     Row Name 12/02/19 1554       Functional Status    Usual Activity Tolerance  good    Current Activity Tolerance  fair       Functional Status, IADL    Medications  assistive person Spouse, Bubba Rubio    Meal Preparation  assistive person Spouse, Bubba Rubio    Housekeeping  assistive person Spouse, Bubba Rubio    Laundry  assistive person Spouse, Bubba Rubio    Shopping  assistive person Spouse, Bubba Rubio        Psychosocial    No documentation.       Abuse/Neglect    No documentation.       Legal    No documentation.       Substance Abuse    No documentation.       Patient Forms    No documentation.           Sadia Rivera RN

## 2019-12-02 NOTE — NURSING NOTE
Concerned that the patient is overmedicating. I admitted the pt on 11/30, in which she stated she had no medications with her from home. Upon arrival for my shift today, pt had a makeup bag with several bottles of medications. She had 3 to 4 bottles of each of her home meds. Pt states she was given 3 months supplies of some of them. Told the pt that any meds she takes here has to be given by us and must be on her MAR. Attempted to take the meds to pharmacy to be locked up, pt refused and requested her son to take them home. Son was at bedside for this discussion and said he would take them, he showed me the bag as he left tonight. Pt is very forgetful. We have discussed her home meds several times. I also discussed with her and her son, about  making a plan for her medicines. Charge nurse and APRN notified.

## 2019-12-02 NOTE — PLAN OF CARE
Problem: Patient Care Overview  Goal: Plan of Care Review  Outcome: Ongoing (interventions implemented as appropriate)   12/02/19 1838   Coping/Psychosocial   Plan of Care Reviewed With patient   Plan of Care Review   Progress improving   OTHER   Outcome Summary Denies nausea, GI soft, consistent carb diet, VSS, ambulated in hallway, confused at times     Goal: Individualization and Mutuality  Outcome: Ongoing (interventions implemented as appropriate)   12/02/19 1838   Individualization   Patient Specific Goals (Include Timeframe) No falls or injury   Patient Specific Interventions Call light within reach, bed/chair alarms activated     Goal: Interprofessional Rounds/Family Conf  Outcome: Ongoing (interventions implemented as appropriate)   12/02/19 1838   Interdisciplinary Rounds/Family Conf   Participants nursing;patient       Problem: Fall Risk (Adult)  Goal: Identify Related Risk Factors and Signs and Symptoms  Outcome: Ongoing (interventions implemented as appropriate)   12/02/19 1838   Fall Risk (Adult)   Related Risk Factors (Fall Risk) environment unfamiliar   Signs and Symptoms (Fall Risk) presence of risk factors     Goal: Absence of Fall  Outcome: Ongoing (interventions implemented as appropriate)   12/02/19 1838   Fall Risk (Adult)   Absence of Fall making progress toward outcome       Problem: Pain, Chronic (Adult)  Goal: Identify Related Risk Factors and Signs and Symptoms  Outcome: Ongoing (interventions implemented as appropriate)   12/02/19 1838   Pain, Chronic (Adult)   Related Risk Factors (Chronic Pain) knowledge deficit;coping ineffective   Signs and Symptoms (Chronic Pain) fatigue/weakness;sleep pattern change;verbalization of pain/discomfort for a prolonged time period     Goal: Acceptable Pain/Comfort Level and Functional Ability  Outcome: Ongoing (interventions implemented as appropriate)   12/02/19 1838   Pain, Chronic (Adult)   Acceptable Pain/Comfort Level and Functional Ability making  progress toward outcome       Problem: Urinary Tract Infection (Adult)  Goal: Signs and Symptoms of Listed Potential Problems Will be Absent, Minimized or Managed (Urinary Tract Infection)  Outcome: Ongoing (interventions implemented as appropriate)   12/02/19 1475   Goal/Outcome Evaluation   Problems Assessed (Urinary Tract Infection) all   Problems Present (UTI) none

## 2019-12-02 NOTE — PLAN OF CARE
Problem: Patient Care Overview  Goal: Plan of Care Review  Outcome: Ongoing (interventions implemented as appropriate)   12/02/19 0330   Coping/Psychosocial   Plan of Care Reviewed With patient   Plan of Care Review   Progress improving   OTHER   Outcome Summary Pt has had no nausea, vomiting, diarrhea, or pain. Pt appears to be feeling well, but is very anxious about her medications while here. She is very forgetful, she makes repeated requests. Myself and her day shift nurse have had the same discussion over her home meds and what she is taking here. Pt became very upset after I had her son take her medications home, thinking she would not be getting any of her meds period. Discussed with her son making a plan for storing her extra meds, and setting up a pill case and list that they both can use to help manage her medications. Fall precautions maintained, will continue to monitor.        Problem: Fall Risk (Adult)  Goal: Identify Related Risk Factors and Signs and Symptoms  Outcome: Ongoing (interventions implemented as appropriate)   12/02/19 0330   Fall Risk (Adult)   Related Risk Factors (Fall Risk) culprit medication(s);depression/anxiety;fatigue/slow reaction;fear of falling;history of falls;gait/mobility problems;environment unfamiliar;slippery/uneven surfaces   Signs and Symptoms (Fall Risk) presence of risk factors

## 2019-12-03 VITALS
WEIGHT: 139.3 LBS | TEMPERATURE: 98.1 F | SYSTOLIC BLOOD PRESSURE: 158 MMHG | BODY MASS INDEX: 32.24 KG/M2 | HEART RATE: 64 BPM | DIASTOLIC BLOOD PRESSURE: 78 MMHG | OXYGEN SATURATION: 90 % | HEIGHT: 55 IN | RESPIRATION RATE: 16 BRPM

## 2019-12-03 LAB
ANION GAP SERPL CALCULATED.3IONS-SCNC: 12 MMOL/L (ref 5–15)
BUN BLD-MCNC: 9 MG/DL (ref 8–23)
BUN/CREAT SERPL: 14.3 (ref 7–25)
CALCIUM SPEC-SCNC: 8.6 MG/DL (ref 8.6–10.5)
CHLORIDE SERPL-SCNC: 107 MMOL/L (ref 98–107)
CO2 SERPL-SCNC: 22 MMOL/L (ref 22–29)
CREAT BLD-MCNC: 0.63 MG/DL (ref 0.57–1)
DEPRECATED RDW RBC AUTO: 38.9 FL (ref 37–54)
ERYTHROCYTE [DISTWIDTH] IN BLOOD BY AUTOMATED COUNT: 11.9 % (ref 12.3–15.4)
GFR SERPL CREATININE-BSD FRML MDRD: 95 ML/MIN/1.73
GLUCOSE BLD-MCNC: 252 MG/DL (ref 65–99)
GLUCOSE BLDC GLUCOMTR-MCNC: 147 MG/DL (ref 70–130)
GLUCOSE BLDC GLUCOMTR-MCNC: 159 MG/DL (ref 70–130)
GLUCOSE BLDC GLUCOMTR-MCNC: 225 MG/DL (ref 70–130)
HCT VFR BLD AUTO: 33.5 % (ref 34–46.6)
HGB BLD-MCNC: 10.9 G/DL (ref 12–15.9)
MCH RBC QN AUTO: 29.2 PG (ref 26.6–33)
MCHC RBC AUTO-ENTMCNC: 32.5 G/DL (ref 31.5–35.7)
MCV RBC AUTO: 89.8 FL (ref 79–97)
PLATELET # BLD AUTO: 177 10*3/MM3 (ref 140–450)
PMV BLD AUTO: 9.6 FL (ref 6–12)
POTASSIUM BLD-SCNC: 3.9 MMOL/L (ref 3.5–5.2)
RBC # BLD AUTO: 3.73 10*6/MM3 (ref 3.77–5.28)
SODIUM BLD-SCNC: 141 MMOL/L (ref 136–145)
WBC NRBC COR # BLD: 13.85 10*3/MM3 (ref 3.4–10.8)

## 2019-12-03 PROCEDURE — 85027 COMPLETE CBC AUTOMATED: CPT | Performed by: INTERNAL MEDICINE

## 2019-12-03 PROCEDURE — 25010000002 HYDROCORTISONE SODIUM SUCCINATE 100 MG RECONSTITUTED SOLUTION: Performed by: INTERNAL MEDICINE

## 2019-12-03 PROCEDURE — 82962 GLUCOSE BLOOD TEST: CPT

## 2019-12-03 PROCEDURE — 80048 BASIC METABOLIC PNL TOTAL CA: CPT | Performed by: INTERNAL MEDICINE

## 2019-12-03 PROCEDURE — 25010000002 CEFTRIAXONE PER 250 MG: Performed by: INTERNAL MEDICINE

## 2019-12-03 PROCEDURE — 99239 HOSP IP/OBS DSCHRG MGMT >30: CPT | Performed by: NURSE PRACTITIONER

## 2019-12-03 RX ORDER — FLUTICASONE PROPIONATE 50 MCG
1 SPRAY, SUSPENSION (ML) NASAL DAILY
Qty: 16 ML | Refills: 0 | Status: SHIPPED | OUTPATIENT
Start: 2019-12-03 | End: 2019-12-04 | Stop reason: SDUPTHER

## 2019-12-03 RX ADMIN — CETIRIZINE HYDROCHLORIDE 10 MG: 10 TABLET, FILM COATED ORAL at 09:15

## 2019-12-03 RX ADMIN — FLUTICASONE PROPIONATE 1 SPRAY: 50 SPRAY, METERED NASAL at 09:15

## 2019-12-03 RX ADMIN — HYDROCORTISONE 15 MG: 5 TABLET ORAL at 09:15

## 2019-12-03 RX ADMIN — INSULIN LISPRO 3 UNITS: 100 INJECTION, SOLUTION INTRAVENOUS; SUBCUTANEOUS at 09:14

## 2019-12-03 RX ADMIN — ALPRAZOLAM 0.5 MG: 0.5 TABLET ORAL at 12:43

## 2019-12-03 RX ADMIN — HYDROCORTISONE SODIUM SUCCINATE 50 MG: 100 INJECTION, POWDER, FOR SOLUTION INTRAMUSCULAR; INTRAVENOUS at 01:05

## 2019-12-03 RX ADMIN — SODIUM CHLORIDE, PRESERVATIVE FREE 10 ML: 5 INJECTION INTRAVENOUS at 09:14

## 2019-12-03 RX ADMIN — LEVOTHYROXINE SODIUM 50 MCG: 50 TABLET ORAL at 06:11

## 2019-12-03 RX ADMIN — CEFTRIAXONE 1 G: 1 INJECTION, POWDER, FOR SOLUTION INTRAMUSCULAR; INTRAVENOUS at 12:43

## 2019-12-03 NOTE — DISCHARGE SUMMARY
[facility] Medicine Services  DISCHARGE SUMMARY    Patient Name: Karen Rubio  : 1955  MRN: [id number]    Date of Admission: 2019  4:09 PM  Date of Discharge:  12/3/2019  Primary Care Physician: Rob Hernandez MD    Consults     No orders found from 2019 to 2019.          Hospital Course     Presenting Problem:   Nausea, vomiting, and diarrhea [R11.2, R19.7]    Active Hospital Problems    Diagnosis  POA   • **Sepsis (CMS/HCC) [A41.9]  Yes   • UTI (urinary tract infection), bacterial [N39.0, A49.9]  Yes   • [place]'s disease (CMS/[facility]) [E27.1]  Yes   • Lactic acidosis [E87.2]  Yes   • Pulmonary nodule [R91.1]  Unknown   • Pericardial effusion [I31.3]  Unknown   • Nausea, vomiting, and diarrhea [R11.2, R19.7]  Yes   • Thyroid disorder [E07.9]  Yes   • Postablative hypothyroidism [E89.0]  Yes   • Hypertension [I10]  Yes      Resolved Hospital Problems   No resolved problems to display.          Hospital Course:  Karen Rubio is a 64 y.o. female with a PMH significant for Kosciusko's disease, history of ovarian cancer, hypothyroidism, diabetes mellitus, anxiety and depression who presented to the ED with complaints of nausea, vomiting and diarrhea x 2 days.  She was diagnosed with a UTI and possible gastroenteritis and admitted to hospital medicine services.  She was initiated on ceftriaxone and her urine was sent for culture.  The culture is still not final but is growing < 10K of gram negative bacilli.  She has received three doses of rocephin which will complete treatment.  Her diarrhea has resolved.  She complains of poor appetite but was able to eat some of her breakfast this am with no difficulty.  She is now medically stable and ready to be discharged home.  She will need to follow up with her PCP at their first available hospital follow up appt.         Day of Discharge     HPI:   Up on side of bed with nursing assistant at bedside.  Just came  back from restroom.  No diarrhea today.  Ate a few bites of breakfast.  Mainly concerned with the black mold that is growing in her rental home and how it is affecting her health.  She reports that she will be moving out in the next 2 weeks.      Review of Systems  Gen- No fevers, chills  CV- No chest pain, palpitations  Resp- No cough, dyspnea  GI- No N/V/D, abd pain      Otherwise ROS is negative except as mentioned in the HPI.    Vital Signs:   Temp:  [97.3 °F (36.3 °C)-98.1 °F (36.7 °C)] 98.1 °F (36.7 °C)  Heart Rate:  [63-94] 63  Resp:  [16-18] 16  BP: (133-138)/(60-77) 138/60     Physical Exam:  Constitutional: No acute distress, awake, alert, up on side of bed, no family at bedside.   HENT: NCAT, mucous membranes moist  Respiratory: Clear to auscultation bilaterally,no wheezes, respiratory effort normal on room air  Cardiovascular: RRR, no murmurs, rubs, or gallops, palpable pedal pulses bilaterally  Gastrointestinal: Positive bowel sounds, soft, nontender, nondistended  Musculoskeletal: No bilateral ankle edema  Psychiatric: Appropriate affect, cooperative, talkative  Neurologic: Oriented x 3, strength symmetric in all extremities, Cranial Nerves grossly intact to confrontation, speech clear  Skin: No rashes to exposed skin       Pertinent  and/or Most Recent Results     Results from last 7 days   Lab Units 12/03/19  0519 12/01/19 0239 11/30/19  1631   WBC 10*3/mm3 13.85* 9.77 13.94*   HEMOGLOBIN g/dL 10.9* 12.5 14.0   HEMATOCRIT % 33.5* 37.4 42.0   PLATELETS 10*3/mm3 177 175 221   SODIUM mmol/L 141 137 143   POTASSIUM mmol/L 3.9 4.2 4.2   CHLORIDE mmol/L 107 105 103   CO2 mmol/L 22.0 20.0* 25.0   BUN mg/dL 9 9 7*   CREATININE mg/dL 0.63 0.79 0.94   GLUCOSE mg/dL 252* 203* 164*   CALCIUM mg/dL 8.6 8.6 10.0     Results from last 7 days   Lab Units 12/01/19  0239 11/30/19  1631   BILIRUBIN mg/dL 0.8 0.7   ALK PHOS U/L 72 85   ALT (SGPT) U/L 12 14   AST (SGOT) U/L 21 25           Invalid input(s): TG, LDLCALC,  LDLREALC  Results from last 7 days   Lab Units 12/01/19  0521 12/01/19  0239 11/30/19  1631   TSH uIU/mL 0.814  --   --    HEMOGLOBIN A1C %  --  7.70*  --    LACTATE mmol/L  --  1.3 2.9*       Brief Urine Lab Results  (Last result in the past 365 days)      Color   Clarity   Blood   Leuk Est   Nitrite   Protein   CREAT   Urine HCG        12/01/19 0450 Yellow Cloudy Negative Moderate (2+) Negative Negative               Microbiology Results Abnormal     Procedure Component Value - Date/Time    Blood Culture - Blood, Hand, Left [[id number]] Collected:  12/01/19 0231    Lab Status:  Preliminary result Specimen:  Blood from Hand, Left Updated:  12/03/19 0515     Blood Culture No growth at 2 days    Blood Culture - Blood, Arm, Right [[id number]] Collected:  12/01/19 0235    Lab Status:  Preliminary result Specimen:  Blood from Arm, Right Updated:  12/03/19 0500     Blood Culture No growth at 2 days    Urine Culture - Urine, Urine, Random Void [[id number]]  (Abnormal) Collected:  12/01/19 0450    Lab Status:  Preliminary result Specimen:  Urine, Random Void Updated:  12/02/19 1914     Urine Culture <10,000 CFU/mL Gram Negative Bacilli    Narrative:       Work up per Dr. Hewitt.    Urine Culture - Urine, Urine, Catheter [[id number]]  (Normal) Collected:  11/30/19 2014    Lab Status:  Final result Specimen:  Urine, Catheter Updated:  12/02/19 1139     Urine Culture No growth at 24 hours          Imaging Results (All)     Procedure Component Value Units Date/Time    CT Abdomen Pelvis Without Contrast [[id number]] Collected:  11/30/19 1828     Updated:  12/03/19 0901    Narrative:       EXAMINATION: CT ABDOMEN/PELVIS WO CONTRAST - 11/30/2019      INDICATION: Left lower quadrant pain. Nausea, vomiting, and diarrhea.     TECHNIQUE: Unenhanced CT imaging of the abdomen and pelvis was performed  with additional coronal and sagittal reformatted imaging provided for  review.     The radiation dose reduction device was turned on for each  scan per the  ALARA (As Low as Reasonably Achievable) protocol.     COMPARISON: None     FINDINGS: Visualized lower lungs are clear. Nonspecific 3 mm pulmonary  nodule noted within the left lower lobe. A pericardial effusion is  identified and incompletely imaged.     The liver is unremarkable in appearance. Spleen does not demonstrate  abnormality. The adrenal glands are unremarkable. The pancreas does not  demonstrate acute abnormality. The gallbladder has likely been  surgically removed. The kidneys do not demonstrate abnormality. There is  no evidence of hydronephrosis. There is no convincing evidence of  obstructive uropathy. Identified adjacent the bilateral ureters are  scattered calcifications favored to be within the ovarian vein. There is  no convincing evidence of obstructive uropathy identified. Urinary  bladder is unremarkable in appearance. No pelvic mass identified. No  free fluid noted in the pelvis. The retroperitoneum demonstrates  advanced atherosclerotic calcifications of the abdominal aorta and its  branches. Thoracolumbar degenerative changes are identified. There is a  deformity of the L3 vertebral body with posterior extension of the  posterior cortex identified which appears likely related to chronic  deformity. There is no convincing evidence of fracture identified within  this region. Correlate with symptomatology. The left inferior pubic  ramus demonstrates a remote appearing fracture. The bony pelvis appears  intact otherwise. Diffuse osteopenia of the osseous structures is  identified. Subcutaneous air is identified within the left lower lateral  soft tissues. Stomach is unremarkable in appearance. The small bowel is  nondilated. No evidence of bowel obstruction is appreciated. No right  lower quadrant inflammation identified. The colon is unremarkable in  appearance. The descending colon and sigmoid colon are slightly  underdistended and otherwise unremarkable.       Impression:           No acute intra-abdominal or pelvic process is appreciated.     Chronic-appearing deformities of the lower lumbar spine as described  above which appear similar to the MRI of the lumbar spine performed  12/16/2016. There are advanced degenerative changes of the lumbar spine  noted.     Incompletely imaged pericardial effusion and bilateral lower lobe  subcentimeter pulmonary nodules, the largest measuring 7 mm within the  right lower lobe. Consider follow-up CT imaging of the chest for further  evaluation.     Subcutaneous air within the left lateral gluteal soft tissues which  likely relates to  possible medication injection. Correlate with  physical exam and patient history.     DICTATED:   11/30/2019  EDITED/ls :   11/30/2019               This report was finalized on 12/3/2019 8:58 AM by Dr. Nolan Cardoza MD.                       Results for orders placed during the hospital encounter of 11/30/19   Adult Transthoracic Echo Complete W/ Cont if Necessary Per Protocol    Narrative · Mild mitral valve regurgitation is present  · There is a small (<1cm) circumferential pericardial effusion.  · There is calcification of the aortic valve.  · Right ventricular cavity is mildly dilated.  · LVSF is normal           Order Current Status    Norovirus, RT-PCR - Stool, Per Rectum In process    Blood Culture - Blood, Arm, Right Preliminary result    Blood Culture - Blood, Hand, Left Preliminary result    Urine Culture - Urine, Urine, Random Void Preliminary result        Discharge Details        Discharge Medications      Continue These Medications      Instructions Start Date   albuterol sulfate  (90 Base) MCG/ACT inhaler  Commonly known as:  PROVENTIL HFA;VENTOLIN HFA;PROAIR HFA   2 puffs, Inhalation, Every 4 Hours PRN      ALPRAZolam 1 MG tablet  Commonly known as:  XANAX   1 mg, Oral, 4 Times Daily      cetirizine 10 MG tablet  Commonly known as:  zyrTEC   10 mg, Oral, Daily      citalopram 40 MG  tablet  Commonly known as:  CeleXA   40 mg, Oral, Daily      cyanocobalamin 1000 MCG/ML injection   INJECT 1ML EVERY 2 WEEKS AS DIRECTED      fluticasone 50 MCG/ACT nasal spray  Commonly known as:  FLONASE   1 spray, Nasal, Daily      gabapentin 300 MG capsule  Commonly known as:  NEURONTIN   300 mg, Oral, Every 8 Hours      hydrocortisone 5 MG tablet  Commonly known as:  CORTEF   TAKE 3 TABLETS BY MOUTH EVERY DAY IN THE MORNING AND ALSO TAKE 1 TABLET EVERY EVENING      ibuprofen 600 MG tablet  Commonly known as:  ADVIL,MOTRIN   600 mg, Oral, Every 6 Hours PRN      levothyroxine 50 MCG tablet  Commonly known as:  SYNTHROID, LEVOTHROID   50 mcg, Oral, Daily      montelukast 10 MG tablet  Commonly known as:  SINGULAIR   10 mg, Oral, Daily      oxyCODONE-acetaminophen  MG per tablet  Commonly known as:  PERCOCET   1 tablet, Oral, Every 4 Hours PRN      simvastatin 20 MG tablet  Commonly known as:  ZOCOR   20 mg, Oral, Nightly      SITagliptin 100 MG tablet  Commonly known as:  JANUVIA   100 mg, Oral, Daily      vitamin D 1.25 MG (71310 UT) capsule capsule  Commonly known as:  ERGOCALCIFEROL   TAKE ONE CAPSULE BY MOUTH ONCE WEEKLY             Allergies   Allergen Reactions   • Valium [Diazepam]          Discharge Disposition:  Home or Self Care    Diet:  Hospital:  Diet Order   Procedures   • Diet Regular; Consistent Carbohydrate, GI Soft       Activity:  Activity Instructions     Activity as Tolerated                   CODE STATUS:    Code Status and Medical Interventions:   Ordered at: 12/01/19 0020     Level Of Support Discussed With:    Patient     Code Status:    CPR     Medical Interventions (Level of Support Prior to Arrest):    Full       Future Appointments   Date Time Provider Department Center   12/5/2019 10:15 AM Aleah Morales MD [facility] None   12/26/2019  2:30 PM Rob Hernandez MD [facility] None   1/8/2020  2:00 PM Ruben Bazan MD [facility] None       Additional Instructions  for the Follow-ups that You Need to Schedule     Discharge Follow-up with PCP   As directed       Currently Documented PCP:    Rob Hernandez MD    PCP Phone Number:    [phone]     Follow Up Details:  first available hospital follow up appt.               Time Spent on Discharge:  35 minutes    Electronically signed by REDD Rinaldi, 12/03/19, 10:12 AM.

## 2019-12-03 NOTE — PAYOR COMM NOTE
"Bambi Casiano RN Utilization Review [phone]  Fax # [phone]      Please note discharge date.      Samina Rubio (64 y.o. Female)     Date of Birth Social Security Number Address Home Phone MRN    1955  [address] [facility] [address] [facility] [address] [phone] [id number]    Nondenominational Marital Status          Yazdanism        Admission Date Admission Type Admitting Provider Attending Provider Department, Room/Bed    19 Emergency Tamar Hewitt MD Brown, Hannah, MD [facility] 2F, S211/    Discharge Date Discharge Disposition Discharge Destination         Home or Self Care              Attending Provider:  Tamar Hewitt MD    Allergies:  Valium [Diazepam]    Isolation:  None   Infection:  None   Code Status:  CPR    Ht:  132.1 cm (52\")   Wt:  63.2 kg (139 lb 4.8 oz)    Admission Cmt:  None   Principal Problem:  Sepsis (CMS/HCC) [A41.9]                 Active Insurance as of 2019     Primary Coverage     Payor Plan Insurance Group Employer/Plan Group    [facility] MEDICARE REPLACEMENT [employer] MEDICARE REPLACEMENT      Payor Plan Address Payor Plan Phone Number Payor Plan Fax Number Effective Dates    [address] [address] [phone]  2019 - None Entered    [facility] [id number]       Subscriber Name Subscriber Birth Date Member ID       SAMINA RUBIO 1955 [id number]                 Emergency Contacts      (Rel.) Home Phone Work Phone Mobile Phone    RubioBubba ramirez (Spouse) [phone] -- [phone]    Tania Abraham (Daughter) -- -- [phone]               Discharge Summary      Patricia Davis APRN at 19 1012              [facility] Medicine Services  DISCHARGE SUMMARY    Patient Name: Samina Rubio  : 1955  MRN: [id number]    Date of Admission: 2019  4:09 PM  Date of Discharge:  12/3/2019  Primary Care Physician: Rob Hernandez MD    Consults     No orders found from " 11/1/2019 to 12/1/2019.          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          Hospital Course:  Karen Rubio is a 64 y.o. female with a PMH significant for Shawn's disease, history of ovarian cancer, hypothyroidism, diabetes mellitus, anxiety and depression who presented to the ED with complaints of nausea, vomiting and diarrhea x 2 days.  She was diagnosed with a UTI and possible gastroenteritis and admitted to hospital medicine services.  She was initiated on ceftriaxone and her urine was sent for culture.  The culture is still not final but is growing < 10K of gram negative bacilli.  She has received three doses of rocephin which will complete treatment.  Her diarrhea has resolved.  She complains of poor appetite but was able to eat some of her breakfast this am with no difficulty.  She is now medically stable and ready to be discharged home.  She will need to follow up with her PCP at their first available hospital follow up appt.         Day of Discharge     HPI:   Up on side of bed with nursing assistant at bedside.  Just came back from restroom.  No diarrhea today.  Ate a few bites of breakfast.  Mainly concerned with the black mold that is growing in her rental home and how it is affecting her health.  She reports that she will be moving out in the next 2 weeks.      Review of Systems  Gen- No fevers, chills  CV- No chest pain,  palpitations  Resp- No cough, dyspnea  GI- No N/V/D, abd pain      Otherwise ROS is negative except as mentioned in the HPI.    Vital Signs:   Temp:  [97.3 °F (36.3 °C)-98.1 °F (36.7 °C)] 98.1 °F (36.7 °C)  Heart Rate:  [63-94] 63  Resp:  [16-18] 16  BP: (133-138)/(60-77) 138/60     Physical Exam:  Constitutional: No acute distress, awake, alert, up on side of bed, no family at bedside.   HENT: NCAT, mucous membranes moist  Respiratory: Clear to auscultation bilaterally,no wheezes, respiratory effort normal on room air  Cardiovascular: RRR, no murmurs, rubs, or gallops, palpable pedal pulses bilaterally  Gastrointestinal: Positive bowel sounds, soft, nontender, nondistended  Musculoskeletal: No bilateral ankle edema  Psychiatric: Appropriate affect, cooperative, talkative  Neurologic: Oriented x 3, strength symmetric in all extremities, Cranial Nerves grossly intact to confrontation, speech clear  Skin: No rashes to exposed skin       Pertinent  and/or Most Recent Results     Results from last 7 days   Lab Units 12/03/19  0519 12/01/19 0239 11/30/19  1631   WBC 10*3/mm3 13.85* 9.77 13.94*   HEMOGLOBIN g/dL 10.9* 12.5 14.0   HEMATOCRIT % 33.5* 37.4 42.0   PLATELETS 10*3/mm3 177 175 221   SODIUM mmol/L 141 137 143   POTASSIUM mmol/L 3.9 4.2 4.2   CHLORIDE mmol/L 107 105 103   CO2 mmol/L 22.0 20.0* 25.0   BUN mg/dL 9 9 7*   CREATININE mg/dL 0.63 0.79 0.94   GLUCOSE mg/dL 252* 203* 164*   CALCIUM mg/dL 8.6 8.6 10.0     Results from last 7 days   Lab Units 12/01/19 0239 11/30/19  1631   BILIRUBIN mg/dL 0.8 0.7   ALK PHOS U/L 72 85   ALT (SGPT) U/L 12 14   AST (SGOT) U/L 21 25           Invalid input(s): TG, LDLCALC, LDLREALC  Results from last 7 days   Lab Units 12/01/19  0521 12/01/19 0239 11/30/19  1631   TSH uIU/mL 0.814  --   --    HEMOGLOBIN A1C %  --  7.70*  --    LACTATE mmol/L  --  1.3 2.9*       Brief Urine Lab Results  (Last result in the past 365 days)      Color   Clarity   Blood   Leuk Est   Nitrite    Protein   CREAT   Urine HCG        12/01/19 0450 Yellow Cloudy Negative Moderate (2+) Negative Negative               Microbiology Results Abnormal     Procedure Component Value - Date/Time    Blood Culture - Blood, Hand, Left [[id number]] Collected:  12/01/19 0231    Lab Status:  Preliminary result Specimen:  Blood from Hand, Left Updated:  12/03/19 0515     Blood Culture No growth at 2 days    Blood Culture - Blood, Arm, Right [[id number]] Collected:  12/01/19 0235    Lab Status:  Preliminary result Specimen:  Blood from Arm, Right Updated:  12/03/19 0500     Blood Culture No growth at 2 days    Urine Culture - Urine, Urine, Random Void [[id number]]  (Abnormal) Collected:  12/01/19 0450    Lab Status:  Preliminary result Specimen:  Urine, Random Void Updated:  12/02/19 1914     Urine Culture <10,000 CFU/mL Gram Negative Bacilli    Narrative:       Work up per Dr. Hewitt.    Urine Culture - Urine, Urine, Catheter [[id number]]  (Normal) Collected:  11/30/19 2014    Lab Status:  Final result Specimen:  Urine, Catheter Updated:  12/02/19 1139     Urine Culture No growth at 24 hours          Imaging Results (All)     Procedure Component Value Units Date/Time    CT Abdomen Pelvis Without Contrast [[id number]] Collected:  11/30/19 1828     Updated:  12/03/19 0901    Narrative:       EXAMINATION: CT ABDOMEN/PELVIS WO CONTRAST - 11/30/2019      INDICATION: Left lower quadrant pain. Nausea, vomiting, and diarrhea.     TECHNIQUE: Unenhanced CT imaging of the abdomen and pelvis was performed  with additional coronal and sagittal reformatted imaging provided for  review.     The radiation dose reduction device was turned on for each scan per the  ALARA (As Low as Reasonably Achievable) protocol.     COMPARISON: None     FINDINGS: Visualized lower lungs are clear. Nonspecific 3 mm pulmonary  nodule noted within the left lower lobe. A pericardial effusion is  identified and incompletely imaged.     The liver is unremarkable in  appearance. Spleen does not demonstrate  abnormality. The adrenal glands are unremarkable. The pancreas does not  demonstrate acute abnormality. The gallbladder has likely been  surgically removed. The kidneys do not demonstrate abnormality. There is  no evidence of hydronephrosis. There is no convincing evidence of  obstructive uropathy. Identified adjacent the bilateral ureters are  scattered calcifications favored to be within the ovarian vein. There is  no convincing evidence of obstructive uropathy identified. Urinary  bladder is unremarkable in appearance. No pelvic mass identified. No  free fluid noted in the pelvis. The retroperitoneum demonstrates  advanced atherosclerotic calcifications of the abdominal aorta and its  branches. Thoracolumbar degenerative changes are identified. There is a  deformity of the L3 vertebral body with posterior extension of the  posterior cortex identified which appears likely related to chronic  deformity. There is no convincing evidence of fracture identified within  this region. Correlate with symptomatology. The left inferior pubic  ramus demonstrates a remote appearing fracture. The bony pelvis appears  intact otherwise. Diffuse osteopenia of the osseous structures is  identified. Subcutaneous air is identified within the left lower lateral  soft tissues. Stomach is unremarkable in appearance. The small bowel is  nondilated. No evidence of bowel obstruction is appreciated. No right  lower quadrant inflammation identified. The colon is unremarkable in  appearance. The descending colon and sigmoid colon are slightly  underdistended and otherwise unremarkable.       Impression:          No acute intra-abdominal or pelvic process is appreciated.     Chronic-appearing deformities of the lower lumbar spine as described  above which appear similar to the MRI of the lumbar spine performed  12/16/2016. There are advanced degenerative changes of the lumbar spine  noted.      Incompletely imaged pericardial effusion and bilateral lower lobe  subcentimeter pulmonary nodules, the largest measuring 7 mm within the  right lower lobe. Consider follow-up CT imaging of the chest for further  evaluation.     Subcutaneous air within the left lateral gluteal soft tissues which  likely relates to  possible medication injection. Correlate with  physical exam and patient history.     DICTATED:   11/30/2019  EDITED/ls :   11/30/2019               This report was finalized on 12/3/2019 8:58 AM by Dr. Nolan Cardoza MD.                       Results for orders placed during the hospital encounter of 11/30/19   Adult Transthoracic Echo Complete W/ Cont if Necessary Per Protocol    Narrative · Mild mitral valve regurgitation is present  · There is a small (<1cm) circumferential pericardial effusion.  · There is calcification of the aortic valve.  · Right ventricular cavity is mildly dilated.  · LVSF is normal           Order Current Status    Norovirus, RT-PCR - Stool, Per Rectum In process    Blood Culture - Blood, Arm, Right Preliminary result    Blood Culture - Blood, Hand, Left Preliminary result    Urine Culture - Urine, Urine, Random Void Preliminary result        Discharge Details        Discharge Medications      Continue These Medications      Instructions Start Date   albuterol sulfate  (90 Base) MCG/ACT inhaler  Commonly known as:  PROVENTIL HFA;VENTOLIN HFA;PROAIR HFA   2 puffs, Inhalation, Every 4 Hours PRN      ALPRAZolam 1 MG tablet  Commonly known as:  XANAX   1 mg, Oral, 4 Times Daily      cetirizine 10 MG tablet  Commonly known as:  zyrTEC   10 mg, Oral, Daily      citalopram 40 MG tablet  Commonly known as:  CeleXA   40 mg, Oral, Daily      cyanocobalamin 1000 MCG/ML injection   INJECT 1ML EVERY 2 WEEKS AS DIRECTED      fluticasone 50 MCG/ACT nasal spray  Commonly known as:  FLONASE   1 spray, Nasal, Daily      gabapentin 300 MG capsule  Commonly known as:  NEURONTIN   300  mg, Oral, Every 8 Hours      hydrocortisone 5 MG tablet  Commonly known as:  CORTEF   TAKE 3 TABLETS BY MOUTH EVERY DAY IN THE MORNING AND ALSO TAKE 1 TABLET EVERY EVENING      ibuprofen 600 MG tablet  Commonly known as:  ADVIL,MOTRIN   600 mg, Oral, Every 6 Hours PRN      levothyroxine 50 MCG tablet  Commonly known as:  SYNTHROID, LEVOTHROID   50 mcg, Oral, Daily      montelukast 10 MG tablet  Commonly known as:  SINGULAIR   10 mg, Oral, Daily      oxyCODONE-acetaminophen  MG per tablet  Commonly known as:  PERCOCET   1 tablet, Oral, Every 4 Hours PRN      simvastatin 20 MG tablet  Commonly known as:  ZOCOR   20 mg, Oral, Nightly      SITagliptin 100 MG tablet  Commonly known as:  JANUVIA   100 mg, Oral, Daily      vitamin D 1.25 MG (52774 UT) capsule capsule  Commonly known as:  ERGOCALCIFEROL   TAKE ONE CAPSULE BY MOUTH ONCE WEEKLY             Allergies   Allergen Reactions   • Valium [Diazepam]          Discharge Disposition:  Home or Self Care    Diet:  Hospital:  Diet Order   Procedures   • Diet Regular; Consistent Carbohydrate, GI Soft       Activity:  Activity Instructions     Activity as Tolerated                   CODE STATUS:    Code Status and Medical Interventions:   Ordered at: 12/01/19 0020     Level Of Support Discussed With:    Patient     Code Status:    CPR     Medical Interventions (Level of Support Prior to Arrest):    Full       Future Appointments   Date Time Provider Department Center   12/5/2019 10:15 AM Aleah Morales MD [facility] None   12/26/2019  2:30 PM Rob Hernandez MD [facility] None   1/8/2020  2:00 PM Ruben Bazan MD [facility] None       Additional Instructions for the Follow-ups that You Need to Schedule     Discharge Follow-up with PCP   As directed       Currently Documented PCP:    Rob Hernandez MD    PCP Phone Number:    [phone]     Follow Up Details:  first available hospital follow up appt.               Time Spent on  Discharge:  35 minutes    Electronically signed by REDD Rinaldi, 12/03/19, 10:12 AM.        Electronically signed by Patricia Davis APRN at 12/03/19 1220       Discharge Order (From admission, onward)    Start     Ordered    12/03/19 1011  Discharge patient  Once     Expected Discharge Date:  12/03/19    Expected Discharge Time:  Morning    Discharge Disposition:  Home or Self Care    Physician of Record for Attribution - Please select from Treatment Team:  SARAH CARR [[id number]]    Review needed by CMO to determine Physician of Record:  No       Question Answer Comment   Physician of Record for Attribution - Please select from Treatment Team SARAH CARR    Review needed by CMO to determine Physician of Record No        12/03/19 1012

## 2019-12-03 NOTE — PROGRESS NOTES
[facility] Medicine Services  PROGRESS NOTE    Patient Name: Karen Rubio  : 1955  MRN: [id number]    Date of Admission: 2019  Primary Care Physician: Rob Hernandez MD    Subjective   Subjective     CC: F/U sepsis/UTI    HPI:  Main complaint today is that she does not normally take her full dose of xanax as it makes her too sleepy and she is dealing with a great deal of grief from the loss of her sons and other family members.    Review of Systems  Gen- No fevers, chills  CV- No chest pain, palpitations  Resp- No cough, dyspnea  GI- No N/V/D, abd pain      Objective   Objective     Vital Signs:   Temp:  [98.2 °F (36.8 °C)-98.8 °F (37.1 °C)] 98.8 °F (37.1 °C)  Heart Rate:  [71-81] 76  Resp:  [18] 18  BP: (113-125)/(45-77) 119/45        Physical Exam:  Constitutional: No acute distress, awake, alert  HENT: NCAT, mucous membranes moist  Respiratory: Clear to auscultation bilaterally, respiratory effort normal   Cardiovascular: RRR, no murmurs, rubs, or gallops  Gastrointestinal: Positive bowel sounds, soft, nontender, nondistended  Musculoskeletal: No bilateral ankle edema  Psychiatric: Tearful, cooperative  Neurologic: Cranial Nerves grossly intact to confrontation, speech clear  Skin: No rashes    Results Reviewed:    Results from last 7 days   Lab Units 19  0239 19  1631   WBC 10*3/mm3 9.77 13.94*   HEMOGLOBIN g/dL 12.5 14.0   HEMATOCRIT % 37.4 42.0   PLATELETS 10*3/mm3 175 221     Results from last 7 days   Lab Units 19  0239 19  1631   SODIUM mmol/L 137 143   POTASSIUM mmol/L 4.2 4.2   CHLORIDE mmol/L 105 103   CO2 mmol/L 20.0* 25.0   BUN mg/dL 9 7*   CREATININE mg/dL 0.79 0.94   GLUCOSE mg/dL 203* 164*   CALCIUM mg/dL 8.6 10.0   ALT (SGPT) U/L 12 14   AST (SGOT) U/L 21 25     Estimated Creatinine Clearance: 53.1 mL/min (by C-G formula based on SCr of 0.79 mg/dL).    Microbiology Results Abnormal     Procedure Component Value -  Date/Time    Urine Culture - Urine, Urine, Catheter [[id number]]  (Normal) Collected:  11/30/19 2014    Lab Status:  Final result Specimen:  Urine, Catheter Updated:  12/02/19 1139     Urine Culture No growth at 24 hours    Urine Culture - Urine, Urine, Random Void [[id number]]  (Abnormal) Collected:  12/01/19 0450    Lab Status:  Preliminary result Specimen:  Urine, Random Void Updated:  12/02/19 1125     Urine Culture <10,000 CFU/mL Gram Negative Bacilli     Comment: Call if further workup needed.         Blood Culture - Blood, Hand, Left [[id number]] Collected:  12/01/19 0231    Lab Status:  Preliminary result Specimen:  Blood from Hand, Left Updated:  12/02/19 0515     Blood Culture No growth at 24 hours    Blood Culture - Blood, Arm, Right [[id number]] Collected:  12/01/19 0235    Lab Status:  Preliminary result Specimen:  Blood from Arm, Right Updated:  12/02/19 0515     Blood Culture No growth at 24 hours          Imaging Results (Last 24 Hours)     ** No results found for the last 24 hours. **          Results for orders placed during the hospital encounter of 11/30/19   Adult Transthoracic Echo Complete W/ Cont if Necessary Per Protocol    Narrative · Mild mitral valve regurgitation is present  · There is a small (<1cm) circumferential pericardial effusion.  · There is calcification of the aortic valve.  · Right ventricular cavity is mildly dilated.  · LVSF is normal          I have reviewed the medications:  Scheduled Meds:  ALPRAZolam 0.5 mg Oral 4x Daily   cefTRIAXone 1 g Intravenous Q24H   cetirizine 10 mg Oral Daily   fluticasone 1 spray Nasal Daily   gabapentin 300 mg Oral Nightly   heparin (porcine) 5,000 Units Subcutaneous Q8H   hydrocortisone sodium succinate 50 mg Intravenous Q6H   insulin lispro 0-7 Units Subcutaneous 4x Daily With Meals & Nightly   levothyroxine 50 mcg Oral Q AM   montelukast 10 mg Oral Nightly   sodium chloride 10 mL Intravenous Q12H     Continuous Infusions:  sodium chloride 100  mL/hr Last Rate: 100 mL/hr (12/02/19 0037)     PRN Meds:.•  acetaminophen **OR** acetaminophen **OR** acetaminophen  •  albuterol  •  dextrose  •  dextrose  •  glucagon (human recombinant)  •  ondansetron  •  oxyCODONE-acetaminophen  •  sodium chloride  •  sodium chloride      Assessment/Plan   Assessment / Plan     Active Hospital Problems    Diagnosis  POA   • **Sepsis (CMS/[facility]) [A41.9]  Yes   • UTI (urinary tract infection), bacterial [N39.0, A49.9]  Yes   • 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        Brief Hospital Course to date:  Karen Rubio is a 64 y.o. female with a PMH significant for Steilacoom's disease, history of ovarian cancer, hypothyroidism, diabetes mellitus, anxiety and depression who presents to the ED with complaints of nausea, vomiting and diarrhea.    Patient states that for the past 2 days she has felt poorly with nausea, vomiting and diarrhea.Today  her diarrhea has been so severe that she has had several episodes of bowel incontinence.  She describes the diarrhea as brown and watery.  Due to persistent symptoms she presented to the ED for further evaluation and care.      Sepsis, due to UTI vs. gastroenteritis  -Urine culture pending-asked lab to work up  -Continue ceftriaxone  -Diarrhea resolved  -Stress dose steroids-resume home dose tomorrow     Lactic acidosis  -Resolved with IV fluids     Steilacoom's Disease  -S/P stress dose steroids-resume home dose in am     DM  -A1c 7.7%  -SSI     Asthma  -Continue Singulair 10 mg daily  -Albuterol nebs as needed     Hypothyroidism  -Continue levothyroxine 50 mcg  Daily  -TSH WNL     Pericardial  effusion  -Incidental finding on CT  -<1cm on echo unlikely to be significant but may be followed with repeat echo as an outpatient     Pulmonary nodules  -Incidental finding on CT  -Recommend follow-up outpatient. She has seen pulmonary recently as an outpatient and has follow up scheduled early January     Anxiety/Depression  -Continue citalopram 40 mg daily  -Continue alprazolam, reduced dose per patient request  -Outpatient follow up with psychiatry after discharge       DVT Prophylaxis:  SQ heparin    Disposition: I expect the patient to be discharged tomorrow.    CODE STATUS:   Code Status and Medical Interventions:   Ordered at: 12/01/19 0020     Level Of Support Discussed With:    Patient     Code Status:    CPR     Medical Interventions (Level of Support Prior to Arrest):    Full         Electronically signed by Tamar Hewitt MD, 12/02/19, 7:13 PM.

## 2019-12-03 NOTE — PROGRESS NOTES
Continued Stay Note  [facility]     Patient Name: Karen Rubio  MRN: [id number]  Today's Date: 12/3/2019    Admit Date: 11/30/2019    Discharge Plan     Row Name 12/03/19 1144       Plan    Plan  Plan is home today    Patient/Family in Agreement with Plan  yes    Plan Comments  Plan is home today.  Denies discharge needs.  Home.     Final Discharge Disposition Code  01 - home or self-care        Discharge Codes    No documentation.       Expected Discharge Date and Time     Expected Discharge Date Expected Discharge Time    Dec 3, 2019             Sadia Rivera RN

## 2019-12-03 NOTE — PLAN OF CARE
Problem: Patient Care Overview  Goal: Plan of Care Review  Outcome: Ongoing (interventions implemented as appropriate)   12/03/19 0336   Coping/Psychosocial   Plan of Care Reviewed With patient   Plan of Care Review   Progress improving   OTHER   Outcome Summary VSS, room air, fall precautions maintained. No pain, nausea, vomiting, or diarrhea. Pt very forgetful, makes repeated requests and remarks. Will continue to monitor.       Problem: Fall Risk (Adult)  Goal: Identify Related Risk Factors and Signs and Symptoms   12/03/19 0336   Fall Risk (Adult)   Related Risk Factors (Fall Risk) age-related changes;confusion/agitation;fatigue/slow reaction;history of falls;impaired vision;inadequate lighting;environment unfamiliar   Signs and Symptoms (Fall Risk) presence of risk factors       Problem: Urinary Tract Infection (Adult)  Goal: Signs and Symptoms of Listed Potential Problems Will be Absent, Minimized or Managed (Urinary Tract Infection)  Outcome: Ongoing (interventions implemented as appropriate)   12/03/19 0336   Goal/Outcome Evaluation   Problems Assessed (Urinary Tract Infection) all   Problems Present (UTI) none

## 2019-12-04 ENCOUNTER — READMISSION MANAGEMENT (OUTPATIENT)
Dept: CALL CENTER | Facility: HOSPITAL | Age: 64
End: 2019-12-04

## 2019-12-04 ENCOUNTER — TRANSITIONAL CARE MANAGEMENT TELEPHONE ENCOUNTER (OUTPATIENT)
Dept: INTERNAL MEDICINE | Facility: CLINIC | Age: 64
End: 2019-12-04

## 2019-12-04 LAB
BACTERIA SPEC AEROBE CULT: ABNORMAL
NOROVIRUS GI: POSITIVE
NOROVIRUS GII: NEGATIVE

## 2019-12-04 RX ORDER — FLUTICASONE PROPIONATE 50 MCG
1 SPRAY, SUSPENSION (ML) NASAL DAILY
Qty: 1 BOTTLE | Refills: 3 | Status: SHIPPED | OUTPATIENT
Start: 2019-12-04 | End: 2020-05-22 | Stop reason: SDUPTHER

## 2019-12-04 NOTE — OUTREACH NOTE
Prep Survey      Responses   Facility patient discharged from?  [facility]   Is patient eligible?  Yes   Discharge diagnosis  Sepsis   Does the patient have one of the following disease processes/diagnoses(primary or secondary)?  Sepsis   Does the patient have Home health ordered?  No   Is there a DME ordered?  No   Prep survey completed?  Yes          Izabela Gomez RN

## 2019-12-04 NOTE — OUTREACH NOTE
"TCM call completed.  See TCM flowsheet for details.  Does have upcoming hospital follow up appt with Dr. Hernandez 12/10/19.  Is up and about and states she just returned from court (issue with possible black mold in her rental home). States she is in process of moving.  \"I am still having trouble with my breathing.\"  This is not a new problem and wants to go back to see pulmonologist to determine if all her problems have been associated with black mold.  She is eating and drinking and no issues with bowels or bladder.  States regular BMs are a little loose, but better. Denies fever, chills, hemoptysis, sudden SOB N/V or rapid heart rate.   Most of conversation was spend on upcoming appointments and her concern with black mold.  She did make hospital f/u appt with Dr. Giron and appreciated the call.   "

## 2019-12-05 ENCOUNTER — READMISSION MANAGEMENT (OUTPATIENT)
Dept: CALL CENTER | Facility: HOSPITAL | Age: 64
End: 2019-12-05

## 2019-12-05 NOTE — OUTREACH NOTE
Sepsis Week 1 Survey      Responses   Facility patient discharged from?  [place]   Does the patient have one of the following disease processes/diagnoses(primary or secondary)?  Sepsis   Is there a successful TCM telephone encounter documented?  Yes          Madhavi Nuñez RN

## 2019-12-06 LAB
BACTERIA SPEC AEROBE CULT: NORMAL
BACTERIA SPEC AEROBE CULT: NORMAL

## 2019-12-12 ENCOUNTER — READMISSION MANAGEMENT (OUTPATIENT)
Dept: CALL CENTER | Facility: HOSPITAL | Age: 64
End: 2019-12-12

## 2019-12-12 NOTE — OUTREACH NOTE
Sepsis Week 2 Survey      Responses   Facility patient discharged from?  [place]   Does the patient have one of the following disease processes/diagnoses(primary or secondary)?  Sepsis   Week 2 attempt successful?  Yes   Call start time  1204   Call end time  1208   Discharge diagnosis  Sepsis   Meds reviewed with patient/caregiver?  N/A   Does the patient have all medications related to this admission filled (includes all antibiotics, inhalers, nebulizers,steroids,etc.)  N/A   Is the patient taking all medications as directed (includes completed medication regime)?  N/A   Does the patient have a primary care provider?   Yes   Comments regarding PCP  Pt will see PCP on 12/26   Does the patient have an appointment with their PCP within 7 days of discharge?  Greater than 7 days   What is preventing the patient from scheduling follow up appointments within 7 days of discharge?  Earlier appointment not available   Nursing Interventions  Verified appointment date/time/provider   Has the patient kept scheduled appointments due by today?  N/A   Nursing Interventions  Advised to reschedule appointment   Has home health visited the patient within 72 hours of discharge?  N/A   Psychosocial issues?  No   What is the patient's perception of their health status since discharge?  Improving   Additional teach back comments  Pt says she is doing great, no questions or concerns at this time.   Week 2 call completed?  Yes          Shruthi Yoo RN

## 2019-12-17 ENCOUNTER — OFFICE VISIT (OUTPATIENT)
Dept: FAMILY MEDICINE CLINIC | Facility: CLINIC | Age: 64
End: 2019-12-17

## 2019-12-17 VITALS
HEIGHT: 55 IN | BODY MASS INDEX: 31.01 KG/M2 | TEMPERATURE: 97.6 F | DIASTOLIC BLOOD PRESSURE: 72 MMHG | WEIGHT: 134 LBS | OXYGEN SATURATION: 97 % | HEART RATE: 76 BPM | SYSTOLIC BLOOD PRESSURE: 122 MMHG

## 2019-12-17 DIAGNOSIS — K52.9 GASTROENTERITIS: Primary | ICD-10-CM

## 2019-12-17 DIAGNOSIS — E27.1 ADDISON'S DISEASE (HCC): ICD-10-CM

## 2019-12-17 DIAGNOSIS — R53.83 FATIGUE, UNSPECIFIED TYPE: ICD-10-CM

## 2019-12-17 PROCEDURE — 99213 OFFICE O/P EST LOW 20 MIN: CPT | Performed by: FAMILY MEDICINE

## 2019-12-18 NOTE — PROGRESS NOTES
Subjective   Karen Rubio is a 64 y.o. female    Chief Complaint    Transition of care visit  Hospital follow-up  Episode of nausea vomiting and diarrhea      History of Present Illness  Patient presents for hospital follow-up after recent hospitalization at [facility].  Patient relates her illness back to eating [employer] hamburgers.  The next morning she awoke with nausea vomiting and diarrhea.  She became weak quickly which prompted emergency department visit.  There was concern about septicemia secondary to urinary tract infection however urine culture grew out less than 10,000 colonies of bacteria and blood cultures were negative.  The patient was treated empirically with antibiotics.  She improved but even at the time of discharge still had significant nausea and decreased appetite.  Her diarrhea resolved and vomiting likewise resolved.  Since discharge she has remained somewhat weak but is able to eat and does not have any further diarrhea.  Ongoing issue is that of exposure to black mold in her previous rental residence.  She has now moved so hopefully that will be a nonissue in the future.    The following portions of the patient's history were reviewed and updated as appropriate: allergies, current medications, past social history and problem list    Review of Systems   Constitutional: Positive for fatigue and unexpected weight change. Negative for appetite change, chills and fever.   HENT: Negative.    Respiratory: Negative.    Cardiovascular: Negative.    Gastrointestinal: Positive for nausea. Negative for abdominal distention, abdominal pain, blood in stool, constipation, diarrhea and vomiting.   Endocrine: Negative for polydipsia, polyphagia and polyuria.   Genitourinary: Negative.    Musculoskeletal: Positive for arthralgias, back pain and myalgias.   Neurological: Positive for dizziness and weakness. Negative for tremors, syncope and numbness.   Hematological: Negative for  adenopathy.   Psychiatric/Behavioral: Positive for dysphoric mood. The patient is nervous/anxious.        Objective     Vitals:    12/17/19 1458   BP: 122/72   Pulse: 76   Temp: 97.6 °F (36.4 °C)   SpO2: 97%       Physical Exam   Constitutional: She is oriented to person, place, and time. She appears well-developed and well-nourished.   HENT:   Head: Normocephalic.   Mouth/Throat: Oropharynx is clear and moist.   Eyes: Pupils are equal, round, and reactive to light. Conjunctivae are normal.   Neck: Normal range of motion. Neck supple.   Cardiovascular: Normal rate and regular rhythm.   Pulmonary/Chest: Effort normal and breath sounds normal.   Abdominal: Soft. Bowel sounds are normal. There is no tenderness.   Neurological: She is alert and oriented to person, place, and time.   Skin: Skin is warm and dry.   Nursing note and vitals reviewed.      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  She is still having some residual symptoms.  She may have indeed had some degree of food poisoning but nevertheless she is better.  No change in medications.  She likely would have benefited from a boost in her steroids due to her [place]'s disease.

## 2019-12-19 ENCOUNTER — READMISSION MANAGEMENT (OUTPATIENT)
Dept: CALL CENTER | Facility: HOSPITAL | Age: 64
End: 2019-12-19

## 2019-12-19 ENCOUNTER — TELEPHONE (OUTPATIENT)
Dept: ENDOCRINOLOGY | Facility: CLINIC | Age: 64
End: 2019-12-19

## 2019-12-19 NOTE — OUTREACH NOTE
Sepsis Week 3 Survey      Responses   Facility patient discharged from?  [facility]   Does the patient have one of the following disease processes/diagnoses(primary or secondary)?  Sepsis   Week 3 attempt successful?  Yes   Call start time  1521   Call end time  1528   Discharge diagnosis  Sepsis   Meds reviewed with patient/caregiver?  Yes   Is the patient taking all medications as directed (includes completed medication regime)?  Yes   Medication comments  increased steroids   Has the patient kept scheduled appointments due by today?  Yes   Psychosocial issues?  No   What is the patient's perception of their health status since discharge?  Improving   Is the patient/caregiver able to teach back Sepsis?  S - Shivering,fever or very cold   Is patient/caregiver able to teach back steps to recovery at home?  Set small, achievable goals for return to baseline health   Is the patient/caregiver able to teach back signs and symptoms of worsening condition:  Fever, Rapid heart rate (>90), Shortness of breath/rapid respiratory rate, Altered mental status(confusion/coma)   Is the patient/caregiver able to teach back the hierarchy of who to call/visit for symptoms/problems? PCP, Specialist, Home health nurse, Urgent Care, ED, 911  Yes   Additional teach back comments  Enc pt to try to drink protein supplements or eat puddings as she has no appetite.   Week 3 call completed?  Yes          Mayra Baker RN

## 2019-12-19 NOTE — TELEPHONE ENCOUNTER
JORDANA,    PATIENTS  ELI IS CALLING, HE IS REQUESTING A RETURN CALL CONCERNING PATIENTS VISIT WITH DR NEGRON, AND HE IS WANTING TO INCREASE HER STEROIDS, HE WOULD LIKE TO DISCUSS THIS WITH YOU. ELI CAN BE REACHED [phone].

## 2019-12-19 NOTE — OUTREACH NOTE
Sepsis Week 3 Survey      Responses   Facility patient discharged from?  [facility]   Does the patient have one of the following disease processes/diagnoses(primary or secondary)?  Sepsis   Week 3 attempt successful?  No   Unsuccessful attempts  Attempt 1          Mayra Baker RN

## 2019-12-20 ENCOUNTER — TELEPHONE (OUTPATIENT)
Dept: FAMILY MEDICINE CLINIC | Facility: CLINIC | Age: 64
End: 2019-12-20

## 2019-12-20 DIAGNOSIS — E27.1 ADDISON'S DISEASE (HCC): ICD-10-CM

## 2019-12-20 NOTE — TELEPHONE ENCOUNTER
Mr. Rubio returned call    Monitor BP.   Pt. takes Bystolic 20mg PO daily and Triamterene HCTZ  Holding  Started Metoprolol 25mg PO bid.  Discuss with Dr. Rivera

## 2019-12-20 NOTE — TELEPHONE ENCOUNTER
PT'S  CALLED STATED THE ENDOCRINOLOGY SAYS DR. SMITH NEEDS TO ORDER THE hydrocortisone (CORTEF) 5 MG tablet. HE WANTED TO KNOW IF SHE CAN GET A REFILL ON THIS SO SHE WILL START FEELING BETTER    CONFIRMED [facility]

## 2019-12-23 RX ORDER — HYDROCORTISONE 5 MG/1
TABLET ORAL
Qty: 360 TABLET | Refills: 1 | Status: SHIPPED | OUTPATIENT
Start: 2019-12-23 | End: 2020-01-28 | Stop reason: SDUPTHER

## 2019-12-27 ENCOUNTER — TELEPHONE (OUTPATIENT)
Dept: FAMILY MEDICINE CLINIC | Facility: CLINIC | Age: 64
End: 2019-12-27

## 2019-12-27 NOTE — TELEPHONE ENCOUNTER
PT'S  CALLED WANTS TO KNOW IF HE CAN BRING WIFE UP TO OFFICE TODAY 12/27/2019 TO GET THE B12 SHOT     HE HAS THE MEDICATION AND STATES WIFE IS FEELING TERRIBLE    PLEASE ADVISE AND GIVE CALL BACK TO

## 2019-12-30 ENCOUNTER — READMISSION MANAGEMENT (OUTPATIENT)
Dept: CALL CENTER | Facility: HOSPITAL | Age: 64
End: 2019-12-30

## 2019-12-30 NOTE — OUTREACH NOTE
Sepsis Week 4 Survey      Responses   Facility patient discharged from?  [facility]   Does the patient have one of the following disease processes/diagnoses(primary or secondary)?  Sepsis   Week 4 attempt successful?  Yes   Call start time  1558   Call end time  1607   Discharge diagnosis  Sepsis   Person spoke with today (if not patient) and relationship  Bubba-spouse   Meds reviewed with patient/caregiver?  Yes   Is the patient taking all medications as directed (includes completed medication regime)?  Yes   Medication comments  Pt is having a hard time finding someone to give her her vit. B-12 shot.   Has the patient kept scheduled appointments due by today?  N/A   Comments   is unsure if they've kept all appointments.    Is the patient still receiving Home Health Services?  N/A   Home health comments  Refered  to PCP for home health referral. He verbalized understanding.    What is the patient's perception of their health status since discharge?  Worsening [Pt was vomiting green stuff the other night. Advised for pt to f/u with PCP. Bubba verbalized understanding. ]   Nursing interventions  Nurse provided patient education   Is the patient/caregiver able to teach back Sepsis?  S - Shivering,fever or very cold, S - Short of breath   Nursing interventions  Nurse provided patient education   Is patient/caregiver able to teach back steps to recovery at home?  Eat a balanced diet   Is the patient/caregiver able to teach back signs and symptoms of worsening condition:  Fever, Shortness of breath/rapid respiratory rate   Week 4 call completed?  Yes   Would the patient like one additional call?  No   Graduated  Yes   Did the patient feel the follow up calls were helpful during their recovery period?  Yes   Was the number of calls appropriate?  Yes          Tosin Elizondo RN

## 2020-01-06 ENCOUNTER — TELEPHONE (OUTPATIENT)
Dept: FAMILY MEDICINE CLINIC | Facility: CLINIC | Age: 65
End: 2020-01-06

## 2020-01-06 RX ORDER — CYANOCOBALAMIN 1000 UG/ML
INJECTION, SOLUTION INTRAMUSCULAR; SUBCUTANEOUS
Qty: 2 ML | Refills: 3 | Status: SHIPPED | OUTPATIENT
Start: 2020-01-06 | End: 2020-05-06

## 2020-01-06 NOTE — TELEPHONE ENCOUNTER
Patient called in requesting an appointment.   Patient was in the hospital a couple months ago but has not been well since she claims. Patient said she is having trouble eating and also is having trouble getting her medication. She was advised that she may need a home health nurse. Please return call and advise.

## 2020-01-09 ENCOUNTER — LAB (OUTPATIENT)
Dept: LAB | Facility: HOSPITAL | Age: 65
End: 2020-01-09

## 2020-01-09 ENCOUNTER — OFFICE VISIT (OUTPATIENT)
Dept: FAMILY MEDICINE CLINIC | Facility: CLINIC | Age: 65
End: 2020-01-09

## 2020-01-09 VITALS
DIASTOLIC BLOOD PRESSURE: 80 MMHG | HEIGHT: 55 IN | TEMPERATURE: 97.7 F | OXYGEN SATURATION: 98 % | HEART RATE: 75 BPM | WEIGHT: 130.8 LBS | BODY MASS INDEX: 30.27 KG/M2 | SYSTOLIC BLOOD PRESSURE: 130 MMHG

## 2020-01-09 DIAGNOSIS — E61.2 MAGNESIUM DEFICIENCY: ICD-10-CM

## 2020-01-09 DIAGNOSIS — E27.1 ADDISON'S DISEASE (HCC): ICD-10-CM

## 2020-01-09 DIAGNOSIS — R11.0 NAUSEA: ICD-10-CM

## 2020-01-09 DIAGNOSIS — IMO0001 UNCONTROLLED DIABETES MELLITUS TYPE 2 WITHOUT COMPLICATIONS: ICD-10-CM

## 2020-01-09 DIAGNOSIS — R10.13 ABDOMINAL PAIN, EPIGASTRIC: ICD-10-CM

## 2020-01-09 DIAGNOSIS — R11.0 NAUSEA: Primary | ICD-10-CM

## 2020-01-09 DIAGNOSIS — R53.1 WEAKNESS: ICD-10-CM

## 2020-01-09 LAB
ALBUMIN SERPL-MCNC: 4.4 G/DL (ref 3.5–5.2)
ALBUMIN/GLOB SERPL: 1.5 G/DL
ALP SERPL-CCNC: 100 U/L (ref 39–117)
ALT SERPL W P-5'-P-CCNC: 22 U/L (ref 1–33)
ANION GAP SERPL CALCULATED.3IONS-SCNC: 12.9 MMOL/L (ref 5–15)
AST SERPL-CCNC: 25 U/L (ref 1–32)
BILIRUB SERPL-MCNC: 0.8 MG/DL (ref 0.2–1.2)
BUN BLD-MCNC: 9 MG/DL (ref 8–23)
BUN/CREAT SERPL: 9.2 (ref 7–25)
CALCIUM SPEC-SCNC: 9.8 MG/DL (ref 8.6–10.5)
CHLORIDE SERPL-SCNC: 97 MMOL/L (ref 98–107)
CO2 SERPL-SCNC: 27.1 MMOL/L (ref 22–29)
CREAT BLD-MCNC: 0.98 MG/DL (ref 0.57–1)
GFR SERPL CREATININE-BSD FRML MDRD: 57 ML/MIN/1.73
GLOBULIN UR ELPH-MCNC: 2.9 GM/DL
GLUCOSE BLD-MCNC: 151 MG/DL (ref 65–99)
MAGNESIUM SERPL-MCNC: 1.7 MG/DL (ref 1.6–2.4)
POTASSIUM BLD-SCNC: 5.6 MMOL/L (ref 3.5–5.2)
PROT SERPL-MCNC: 7.3 G/DL (ref 6–8.5)
SODIUM BLD-SCNC: 137 MMOL/L (ref 136–145)

## 2020-01-09 PROCEDURE — 80053 COMPREHEN METABOLIC PANEL: CPT

## 2020-01-09 PROCEDURE — 99214 OFFICE O/P EST MOD 30 MIN: CPT | Performed by: PHYSICIAN ASSISTANT

## 2020-01-09 PROCEDURE — 36415 COLL VENOUS BLD VENIPUNCTURE: CPT

## 2020-01-09 PROCEDURE — 83735 ASSAY OF MAGNESIUM: CPT

## 2020-01-09 RX ORDER — ONDANSETRON 8 MG/1
8 TABLET, ORALLY DISINTEGRATING ORAL EVERY 8 HOURS PRN
Qty: 15 TABLET | Refills: 2 | Status: SHIPPED | OUTPATIENT
Start: 2020-01-09 | End: 2023-01-26 | Stop reason: SDUPTHER

## 2020-01-09 NOTE — PROGRESS NOTES
Subjective   Karen Rubio is a 64 y.o. female  Nausea (ongoing nausea and diarrhea x1 month, seen at  ED on 11/30)      History of Present Illness  Patient comes in today for evaluation of uncontrolled symptoms of nausea, abdominal pain, generalized weakness, uncontrolled diabetes mellitus and [place]'s disease.  Patient was recently seen in the emergency department.  She states that she experienced some nausea vomiting and diarrhea when seen in the ER, is no longer having diarrhea and no longer having vomiting but feels extremely nauseated every time she eats.  She is very anxious.  She states she has not been taking her full dose of Xanax because she has not been eating.  She has not been checking her blood sugar.  She states she was told she had a low magnesium level but is not sure what dosage supplementation to take and so she has been taking approximately 60 mg of magnesium daily over-the-counter.  Patient has been taking all of her chronic medications as prescribed.  She states she recently had to move and the sudden move has been very stressful for her.  She is not sleeping well.  The following portions of the patient's history were reviewed and updated as appropriate: allergies, current medications, past social history and problem list    Review of Systems   Constitutional: Positive for activity change, appetite change and fatigue. Negative for chills, diaphoresis, fever and unexpected weight change.   Eyes: Negative for visual disturbance.   Respiratory: Negative.    Cardiovascular: Negative for chest pain and palpitations.   Gastrointestinal: Positive for abdominal pain and nausea. Negative for blood in stool, constipation, diarrhea, rectal pain and vomiting.   Endocrine: Negative for cold intolerance and heat intolerance.   Genitourinary: Negative.    Musculoskeletal: Positive for back pain and myalgias.   Skin: Negative.    Allergic/Immunologic: Positive for immunocompromised state.    Neurological: Positive for weakness. Negative for tremors, light-headedness and headaches.   Hematological: Negative.    Psychiatric/Behavioral: Positive for sleep disturbance. Negative for agitation, behavioral problems, confusion, decreased concentration, dysphoric mood, hallucinations, self-injury and suicidal ideas. The patient is not nervous/anxious and is not hyperactive.        Objective     Vitals:    01/09/20 1134   BP: 130/80   Pulse: 75   Temp: 97.7 °F (36.5 °C)   SpO2: 98%       Physical Exam   Constitutional: She is oriented to person, place, and time. She appears well-developed and well-nourished.  Non-toxic appearance. She does not have a sickly appearance. She does not appear ill. No distress.   Eyes: Conjunctivae are normal. No scleral icterus.   Cardiovascular: Normal rate, regular rhythm, normal heart sounds and intact distal pulses.   Pulmonary/Chest: Effort normal and breath sounds normal. No respiratory distress.   Abdominal: Soft. Bowel sounds are normal. She exhibits no distension and no mass. There is no tenderness. There is no rebound and no guarding. No hernia.   Musculoskeletal: She exhibits no edema or tenderness.   Neurological: She is alert and oriented to person, place, and time. No cranial nerve deficit. Coordination normal.   Skin: Skin is warm and dry. No rash noted. She is not diaphoretic. No erythema. No pallor.   Psychiatric: Her speech is normal and behavior is normal. Judgment and thought content normal. Her mood appears anxious. Cognition and memory are normal.   Nursing note and vitals reviewed.      Assessment/Plan     Diagnoses and all orders for this visit:    Nausea  -     Comprehensive metabolic panel; Future  -     CBC & Differential; Future    [place]'s disease (CMS/HCC)    Uncontrolled diabetes mellitus type 2 without complications (CMS/HCC)    Magnesium deficiency  -     Magnesium; Future    Abdominal pain, epigastric  -     Comprehensive metabolic panel;  Future  -     CBC & Differential; Future    Weakness    Other orders  -     ondansetron ODT (ZOFRAN ODT) 8 MG disintegrating tablet; Take 1 tablet by mouth Every 8 (Eight) Hours As Needed for Nausea or Vomiting.    I will contact patient tomorrow with lab results for further treatment plan.  Add on Zofran as noted above, recommended Pedialyte as needed.

## 2020-01-10 NOTE — PROGRESS NOTES
I have reviewed the notes, assessments, and/or procedures performed by Rajani Esparza PA-C, I concur with her documentation of Karen Rubio.

## 2020-01-16 ENCOUNTER — TELEPHONE (OUTPATIENT)
Dept: FAMILY MEDICINE CLINIC | Facility: CLINIC | Age: 65
End: 2020-01-16

## 2020-01-21 DIAGNOSIS — E55.9 VITAMIN D DEFICIENCY: ICD-10-CM

## 2020-01-21 RX ORDER — ERGOCALCIFEROL 1.25 MG/1
CAPSULE ORAL
Qty: 4 CAPSULE | Refills: 3 | Status: SHIPPED | OUTPATIENT
Start: 2020-01-21 | End: 2020-01-28 | Stop reason: SDUPTHER

## 2020-01-28 ENCOUNTER — OFFICE VISIT (OUTPATIENT)
Dept: ENDOCRINOLOGY | Facility: CLINIC | Age: 65
End: 2020-01-28

## 2020-01-28 VITALS
WEIGHT: 126 LBS | OXYGEN SATURATION: 98 % | HEART RATE: 62 BPM | DIASTOLIC BLOOD PRESSURE: 78 MMHG | BODY MASS INDEX: 29.16 KG/M2 | HEIGHT: 55 IN | SYSTOLIC BLOOD PRESSURE: 126 MMHG

## 2020-01-28 DIAGNOSIS — E55.9 VITAMIN D DEFICIENCY: ICD-10-CM

## 2020-01-28 DIAGNOSIS — E11.9 TYPE 2 DIABETES MELLITUS WITHOUT COMPLICATION, WITHOUT LONG-TERM CURRENT USE OF INSULIN (HCC): ICD-10-CM

## 2020-01-28 DIAGNOSIS — IMO0001 UNCONTROLLED DIABETES MELLITUS TYPE 2 WITHOUT COMPLICATIONS: Primary | ICD-10-CM

## 2020-01-28 DIAGNOSIS — E27.1 ADDISON'S DISEASE (HCC): ICD-10-CM

## 2020-01-28 DIAGNOSIS — E55.9 VITAMIN D DEFICIENCY, UNSPECIFIED: ICD-10-CM

## 2020-01-28 DIAGNOSIS — E89.0 POSTABLATIVE HYPOTHYROIDISM: ICD-10-CM

## 2020-01-28 LAB
25(OH)D3 SERPL-MCNC: 67.7 NG/ML (ref 30–100)
ANION GAP SERPL CALCULATED.3IONS-SCNC: 14.2 MMOL/L (ref 5–15)
BUN BLD-MCNC: 11 MG/DL (ref 8–23)
BUN/CREAT SERPL: 9.9 (ref 7–25)
CALCIUM SPEC-SCNC: 9.9 MG/DL (ref 8.6–10.5)
CHLORIDE SERPL-SCNC: 94 MMOL/L (ref 98–107)
CO2 SERPL-SCNC: 22.8 MMOL/L (ref 22–29)
CREAT BLD-MCNC: 1.11 MG/DL (ref 0.57–1)
GFR SERPL CREATININE-BSD FRML MDRD: 49 ML/MIN/1.73
GLUCOSE BLD-MCNC: 263 MG/DL (ref 65–99)
HBA1C MFR BLD: 7.33 % (ref 4.8–5.6)
POTASSIUM BLD-SCNC: 5.9 MMOL/L (ref 3.5–5.2)
SODIUM BLD-SCNC: 131 MMOL/L (ref 136–145)
T4 FREE SERPL-MCNC: 0.99 NG/DL (ref 0.93–1.7)
TSH SERPL DL<=0.05 MIU/L-ACNC: 1.72 UIU/ML (ref 0.27–4.2)

## 2020-01-28 PROCEDURE — 82306 VITAMIN D 25 HYDROXY: CPT | Performed by: INTERNAL MEDICINE

## 2020-01-28 PROCEDURE — 84244 ASSAY OF RENIN: CPT | Performed by: INTERNAL MEDICINE

## 2020-01-28 PROCEDURE — 84443 ASSAY THYROID STIM HORMONE: CPT | Performed by: INTERNAL MEDICINE

## 2020-01-28 PROCEDURE — 83036 HEMOGLOBIN GLYCOSYLATED A1C: CPT | Performed by: INTERNAL MEDICINE

## 2020-01-28 PROCEDURE — 84439 ASSAY OF FREE THYROXINE: CPT | Performed by: INTERNAL MEDICINE

## 2020-01-28 PROCEDURE — 99214 OFFICE O/P EST MOD 30 MIN: CPT | Performed by: INTERNAL MEDICINE

## 2020-01-28 PROCEDURE — 80048 BASIC METABOLIC PNL TOTAL CA: CPT | Performed by: INTERNAL MEDICINE

## 2020-01-28 RX ORDER — HYDROCORTISONE 5 MG/1
TABLET ORAL
Qty: 360 TABLET | Refills: 1 | Status: SHIPPED | OUTPATIENT
Start: 2020-01-28 | End: 2020-01-28 | Stop reason: SDUPTHER

## 2020-01-28 RX ORDER — ERGOCALCIFEROL 1.25 MG/1
CAPSULE ORAL
Qty: 4 CAPSULE | Refills: 11 | Status: SHIPPED | OUTPATIENT
Start: 2020-01-28 | End: 2020-01-28 | Stop reason: SDUPTHER

## 2020-01-28 RX ORDER — HYDROCORTISONE 5 MG/1
TABLET ORAL
Qty: 500 TABLET | Refills: 3 | Status: SHIPPED | OUTPATIENT
Start: 2020-01-28 | End: 2020-06-23 | Stop reason: SDUPTHER

## 2020-01-28 RX ORDER — LEVOTHYROXINE SODIUM 0.05 MG/1
50 TABLET ORAL DAILY
Qty: 90 TABLET | Refills: 3 | Status: SHIPPED | OUTPATIENT
Start: 2020-01-28 | End: 2020-07-23 | Stop reason: SDUPTHER

## 2020-01-28 RX ORDER — ERGOCALCIFEROL 1.25 MG/1
CAPSULE ORAL
Qty: 12 CAPSULE | Refills: 3 | Status: SHIPPED | OUTPATIENT
Start: 2020-01-28 | End: 2020-05-06 | Stop reason: SDUPTHER

## 2020-01-28 RX ORDER — DULOXETIN HYDROCHLORIDE 20 MG/1
20 CAPSULE, DELAYED RELEASE ORAL DAILY
Qty: 30 CAPSULE | Refills: 5 | Status: SHIPPED | OUTPATIENT
Start: 2020-01-28 | End: 2020-05-22

## 2020-01-28 NOTE — PATIENT INSTRUCTIONS
Hydrocortisone 20 mg in the morning and 5 mg in the afternoon around 3 pm.     Start Cymbalta for anxiety/depression. 1 tablet daily. Continue xanax as needed.

## 2020-01-28 NOTE — PROGRESS NOTES
Chief complaint  Addisons (Follow Up:  Under a lot of stress.) and Hypothyroidism    Subjective   Karen Rubio is a 64 y.o. female is here today for follow-up.  Follow-up for hypothyroidism and Shawn's disease. HTN and Depression. Diabetes.   Adrenal insufficiency / [place]'s disease dx in 2004. She had significant hyperpigmentation and fatigue.   She was taking hydrocortisone and fludrocortisone. On presentation in Oct 2014 and following visit BP was elevated 180//100 and I have d/c fludrocortisone. BP stabilized.   Still has fatigue, glucose is higher. Last visit 9/2018- AM 15mg, decreased to 5mg in PM.    Diabetes mellitus type 2   She stopped metformin because of diarrhea. On Januvia 100  Mg daily.     Hypothyroidism postablative. S/p I-131 therapy at age 14. She is taking levothyroxine 50 mcg.       Presents with .      She c/o multiple symptoms of fatigue and exhaustion, pain in the legs and back,increased sleepiness, inability to function She stopped celexa and depression is worse. She finds no motivation in getting up from bed. .     Medications    Current Outpatient Medications:   •  albuterol sulfate  (90 Base) MCG/ACT inhaler, Inhale 2 puffs Every 4 (Four) Hours As Needed for Wheezing., Disp: 3.7 g, Rfl: 11  •  ALPRAZolam (XANAX) 1 MG tablet, Take 1 tablet by mouth 4 (Four) Times a Day., Disp: 120 tablet, Rfl: 3  •  cetirizine (zyrTEC) 10 MG tablet, Take 1 tablet by mouth Daily., Disp: 30 tablet, Rfl: 11  •  cyanocobalamin 1000 MCG/ML injection, INJECT 1 MILLILITER INTRAMUSCULARLY EVERY 2 WEEKS AS DIRECTED, Disp: 2 mL, Rfl: 3  •  fluticasone (FLONASE) 50 MCG/ACT nasal spray, 1 spray into the nostril(s) as directed by provider Daily., Disp: 1 bottle, Rfl: 3  •  gabapentin (NEURONTIN) 300 MG capsule, Take 300 mg by mouth Every 8 (Eight) Hours., Disp: , Rfl:   •  hydrocortisone (CORTEF) 5 MG tablet, TAKE 4 TABLETS BY MOUTH EVERY DAY IN THE MORNING AND ALSO TAKE 1 TABLET EVERY DAY AT  3 PM. Take double dose in the event of illness., Disp: 500 tablet, Rfl: 3  •  ibuprofen (ADVIL,MOTRIN) 600 MG tablet, Take 1 tablet by mouth Every 6 (Six) Hours As Needed for Mild Pain ., Disp: 90 tablet, Rfl: 5  •  levothyroxine (SYNTHROID, LEVOTHROID) 50 MCG tablet, Take 1 tablet by mouth Daily., Disp: 90 tablet, Rfl: 3  •  montelukast (SINGULAIR) 10 MG tablet, Take 1 tablet by mouth Daily., Disp: 30 tablet, Rfl: 11  •  ondansetron ODT (ZOFRAN ODT) 8 MG disintegrating tablet, Take 1 tablet by mouth Every 8 (Eight) Hours As Needed for Nausea or Vomiting., Disp: 15 tablet, Rfl: 2  •  oxyCODONE-acetaminophen (PERCOCET)  MG per tablet, Take 1 tablet by mouth Every 4 (Four) Hours As Needed for Moderate Pain ., Disp: 150 tablet, Rfl: 0  •  simvastatin (ZOCOR) 20 MG tablet, Take 1 tablet by mouth Every Night., Disp: 90 tablet, Rfl: 3  •  SITagliptin (JANUVIA) 100 MG tablet, Take 1 tablet by mouth Daily., Disp: 90 tablet, Rfl: 3  •  vitamin D (ERGOCALCIFEROL) 1.25 MG (35527 UT) capsule capsule, TAKE 1 CAPSULE BY MOUTH ONE TIME PER WEEK, Disp: 12 capsule, Rfl: 3  •  DULoxetine (CYMBALTA) 20 MG capsule, Take 1 capsule by mouth Daily., Disp: 30 capsule, Rfl: 5    PMH  The following portions of the patient's history were reviewed and updated as appropriate: allergies, current medications, past family history, past medical history, past social history, past surgical history and problem list.    Review of systems  Review of Systems   Constitutional: Positive for appetite change (improved appetite. ) and unexpected weight change (weight gain). Negative for activity change, chills and diaphoresis.   HENT: Negative for congestion, ear pain, facial swelling, hearing loss, trouble swallowing and voice change.    Eyes: Positive for visual disturbance. Negative for redness and itching.   Cardiovascular: Negative for palpitations and leg swelling.   Gastrointestinal: Negative for abdominal distention, abdominal pain,  "constipation, nausea and vomiting.   Endocrine: Positive for polyuria.        As listed in HPI   Genitourinary: Negative.    Musculoskeletal: Positive for arthralgias and back pain (radiating to the left leg. ). Negative for joint swelling, myalgias, neck pain and neck stiffness.   Skin: Negative.    Allergic/Immunologic: Negative.    Neurological: Positive for light-headedness, numbness and headaches. Negative for syncope.   Hematological: Negative.    Psychiatric/Behavioral: Positive for decreased concentration (memory loss) and sleep disturbance.   All other systems reviewed and are negative.      Physical exam  Objective   Blood pressure 126/78, pulse 62, height 132.1 cm (52\"), weight 57.2 kg (126 lb), SpO2 98 %, not currently breastfeeding.   Physical Exam   Constitutional: She is oriented to person, place, and time. She appears well-developed and well-nourished.   HENT:   Head: Normocephalic and atraumatic.   Mouth/Throat: Mucous membranes are normal.   Eyes: Conjunctivae are normal.   Neck: No JVD present. No thyromegaly present.   The neck is supple and symmetric   Cardiovascular: Normal rate, regular rhythm and normal heart sounds.   Pulmonary/Chest: Effort normal and breath sounds normal.   Musculoskeletal: She exhibits deformity. She exhibits no edema.   Lymphadenopathy:     She has no cervical adenopathy.   Neurological: She is alert and oriented to person, place, and time. She has normal reflexes.   Skin: Skin is warm and dry. No rash noted.   Psychiatric: She has a normal mood and affect. Thought content normal.   Vitals reviewed.        LABS AND IMAGING  Office Visit on 01/28/2020   Component Date Value Ref Range Status   • Glucose 01/28/2020 263* 65 - 99 mg/dL Final   • BUN 01/28/2020 11  8 - 23 mg/dL Final   • Creatinine 01/28/2020 1.11* 0.57 - 1.00 mg/dL Final   • Sodium 01/28/2020 131* 136 - 145 mmol/L Final   • Potassium 01/28/2020 5.9* 3.5 - 5.2 mmol/L Final   • Chloride 01/28/2020 94* 98 - 107 " mmol/L Final   • CO2 01/28/2020 22.8  22.0 - 29.0 mmol/L Final   • Calcium 01/28/2020 9.9  8.6 - 10.5 mg/dL Final   • eGFR Non African Amer 01/28/2020 49* >60 mL/min/1.73 Final   • BUN/Creatinine Ratio 01/28/2020 9.9  7.0 - 25.0 Final   • Anion Gap 01/28/2020 14.2  5.0 - 15.0 mmol/L Final   • TSH 01/28/2020 1.720  0.270 - 4.200 uIU/mL Final   • Free T4 01/28/2020 0.99  0.93 - 1.70 ng/dL Final   • Hemoglobin A1C 01/28/2020 7.33* 4.80 - 5.60 % Final   • 25 Hydroxy, Vitamin D 01/28/2020 67.7  30.0 - 100.0 ng/ml Final   Lab on 01/09/2020   Component Date Value Ref Range Status   • Glucose 01/09/2020 151* 65 - 99 mg/dL Final   • BUN 01/09/2020 9  8 - 23 mg/dL Final   • Creatinine 01/09/2020 0.98  0.57 - 1.00 mg/dL Final   • Sodium 01/09/2020 137  136 - 145 mmol/L Final   • Potassium 01/09/2020 5.6* 3.5 - 5.2 mmol/L Final   • Chloride 01/09/2020 97* 98 - 107 mmol/L Final   • CO2 01/09/2020 27.1  22.0 - 29.0 mmol/L Final   • Calcium 01/09/2020 9.8  8.6 - 10.5 mg/dL Final   • Total Protein 01/09/2020 7.3  6.0 - 8.5 g/dL Final   • Albumin 01/09/2020 4.40  3.50 - 5.20 g/dL Final   • ALT (SGPT) 01/09/2020 22  1 - 33 U/L Final   • AST (SGOT) 01/09/2020 25  1 - 32 U/L Final   • Alkaline Phosphatase 01/09/2020 100  39 - 117 U/L Final   • Total Bilirubin 01/09/2020 0.8  0.2 - 1.2 mg/dL Final   • eGFR Non African Amer 01/09/2020 57* >60 mL/min/1.73 Final   • Globulin 01/09/2020 2.9  gm/dL Final   • A/G Ratio 01/09/2020 1.5  g/dL Final   • BUN/Creatinine Ratio 01/09/2020 9.2  7.0 - 25.0 Final   • Anion Gap 01/09/2020 12.9  5.0 - 15.0 mmol/L Final   • Magnesium 01/09/2020 1.7  1.6 - 2.4 mg/dL Final           Assessment  Assessment/Plan   1. Uncontrolled diabetes mellitus type 2 without complications (CMS/[facility])    2. 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 Postablative hypothyroidism    4. Type 2 diabetes mellitus without complication, without long-term current use of insulin (CMS/[facility])    5. Vitamin D deficiency, unspecified     6. Vitamin D  deficiency      Orders Placed This Encounter   Procedures   • Basic Metabolic Panel   • TSH   • T4, Free   • Hemoglobin A1c   • Renin Direct Assay   • Vitamin D 25 Hydroxy     Plan    -Hydrocortisone dose: 20 mg in am and 5 mg in pm.  The general approach is to provide lowest dose which controls adrenal insufficiency  -add fludrocortisone low dose. In the past it was discontinued because of the hypertension. Her labs show mineralocorticoid deficiency pattern    -BP good today- continue lisinopril 10 mg daily    -diabetes is controlled   -continue Januvia 100 mg daily     -continue Levothyroxine 50 mcg a day. Thyroid function is normal.     -meds refilled    - B12 continued,     -labs today     Started Cymbalta which may help for depression and pain.      Follow-up in 2 months

## 2020-01-30 RX ORDER — FLUDROCORTISONE ACETATE 0.1 MG/1
0.05 TABLET ORAL DAILY
Qty: 30 TABLET | Refills: 6 | Status: SHIPPED | OUTPATIENT
Start: 2020-01-30 | End: 2020-07-23 | Stop reason: SDUPTHER

## 2020-02-09 LAB — RENIN PLAS-CCNC: 2.31 NG/ML/HR (ref 0.17–5.38)

## 2020-02-14 ENCOUNTER — TELEPHONE (OUTPATIENT)
Dept: ENDOCRINOLOGY | Facility: CLINIC | Age: 65
End: 2020-02-14

## 2020-02-14 NOTE — TELEPHONE ENCOUNTER
Spoke with pt and Dr Morales currently has her on 40 mg BID of the Black Cohosh. Please advise if she can increase? Pt is aware Dr Morales is out of the office until Tuesday.

## 2020-02-14 NOTE — TELEPHONE ENCOUNTER
PATIENT IS CALLING WANTING TO SEE IF WE CAN INCREASE HER BLACK COHOSH MEDICATION. SHE IS HAVING ISSUES BEING HOT ALL THE TIME, SWEATING, NOT SLEEPING WELL, NERVE ISSUES. WHAT AMOUNT CAN BE INCREASED TO HELP SYMPTOMS. PATIENTS NUMBER [phone]

## 2020-02-17 NOTE — TELEPHONE ENCOUNTER
Black cohosh is herbal supplement and not FDA regulated. I would not recommend increasing the dose as the effect is not dose dependent.

## 2020-02-21 ENCOUNTER — TELEPHONE (OUTPATIENT)
Dept: FAMILY MEDICINE CLINIC | Facility: CLINIC | Age: 65
End: 2020-02-21
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
## 2020-03-04 ENCOUNTER — TELEPHONE (OUTPATIENT)
Dept: FAMILY MEDICINE CLINIC | Facility: CLINIC | Age: 65
End: 2020-03-04

## 2020-03-04 RX ORDER — PROPRANOLOL HYDROCHLORIDE 20 MG/1
20 TABLET ORAL 2 TIMES DAILY
Qty: 60 TABLET | Refills: 6 | Status: SHIPPED | OUTPATIENT
Start: 2020-03-04 | End: 2020-10-14 | Stop reason: SDUPTHER

## 2020-03-04 NOTE — TELEPHONE ENCOUNTER
Pt says she was previously prescribed a blood pressure medicine and pt is wanting to be prescribed the medication again.  Pt insist she must speak to some today and is requesting call back.

## 2020-03-04 NOTE — TELEPHONE ENCOUNTER
"Pt is having some anxiety and more stressors, and cannot calm down at night.  She took 1/2 xanax last night and was able to calm down.      Her blood pressure has been elevated she feels flushed, and has had some sharp pain in the back of her neck last night.  She wants to know if she can get a low dose BP med, or something you might suggest.      She states her \"zari's has been kicking in\"  She is afraid to take more medication  "

## 2020-03-13 ENCOUNTER — TELEPHONE (OUTPATIENT)
Dept: ENDOCRINOLOGY | Facility: CLINIC | Age: 65
End: 2020-03-13

## 2020-03-13 NOTE — TELEPHONE ENCOUNTER
Spoke with patient she had a questions about her magnesium and cortisone.  Questions answered, patient expressed understanding.

## 2020-03-13 NOTE — TELEPHONE ENCOUNTER
PATIENT IS REQUESTING A RETURN CALL. SHE HAS QUESTIONS ABOUT MEDICATION THAT DR GUZMAN WANTS HER TO TAKE. SHE STATES THAT IT IS URGENT THAT SHE SPEAK WITH YOU TODAY.       [phone]

## 2020-03-25 ENCOUNTER — TELEPHONE (OUTPATIENT)
Dept: FAMILY MEDICINE CLINIC | Facility: CLINIC | Age: 65
End: 2020-03-25

## 2020-03-25 ENCOUNTER — TELEPHONE (OUTPATIENT)
Dept: ENDOCRINOLOGY | Facility: CLINIC | Age: 65
End: 2020-03-25

## 2020-03-25 NOTE — TELEPHONE ENCOUNTER
I spoke with patient and she has scheduled an appointment for tomorrow at 10:15. She said that she may not need to keep the appointment if her friend is able to give her B12 shot tonight.

## 2020-03-25 NOTE — TELEPHONE ENCOUNTER
Pt is due a b12 shot and wanted to come in to the office today. pt does have her medication and syringe.      Please advise and give call [phone]

## 2020-03-25 NOTE — TELEPHONE ENCOUNTER
PT WANTED TO KNOW IF SHE CAN TAKE THESE TOGETHER  FLUTICASONE AND MAGNESIUM TOGETHER     PLEASE CALL [phone]

## 2020-03-27 NOTE — TELEPHONE ENCOUNTER
Spoke with pt and told her the info below. Pt is wanting to know if the Fludrocortisone and the Magnesium is ok to take. Please advise on the dose of Magnesium she should be taking.     Also, pt is requesting refill for needle/syringe for B12 injection because her friend is a retired nurse and she is going to have her give it to her.

## 2020-03-27 NOTE — TELEPHONE ENCOUNTER
"Pt called back stating it is \"fatal she knows this today\" Pt states she is unable to get up, having severe leg pain, and severe weakness, etc. Pt says she has to know if she can take them together and how much of the Magnesium she can take.     I spoke with Dr Jc since she is on call and she stated based off pt's labs in January she does not need supplementation because her Magnesium was normal. If pt would like to take Magnesium she can take the Oxide 400 mg OTC daily but we are not really able to recommend that based off her most recent labs. If pt has been sick she should follow the doubling of Hydrocortisone for 3 days, etc. If pt becomes worse she should go to the hospital.     I called pt back and told her the information from Dr Jc. Pt states she has been having a lot of stress, dog is sick,  thought he had a heart attack and she is watching the news. She states she is having severe anxiety. Pt asked if she gets worse if it is ok to go to [facility] ER. Pt states she has Mag 250 mg at home and wanted to know if that was ok and I told her yes.    "

## 2020-04-20 ENCOUNTER — TELEPHONE (OUTPATIENT)
Dept: FAMILY MEDICINE CLINIC | Facility: CLINIC | Age: 65
End: 2020-04-20

## 2020-04-20 DIAGNOSIS — F41.9 ANXIETY: ICD-10-CM

## 2020-04-20 RX ORDER — ALPRAZOLAM 1 MG/1
1 TABLET ORAL 4 TIMES DAILY
Qty: 120 TABLET | Refills: 0 | Status: SHIPPED | OUTPATIENT
Start: 2020-04-20 | End: 2020-05-07 | Stop reason: SDUPTHER

## 2020-04-20 RX ORDER — ALPRAZOLAM 1 MG/1
1 TABLET ORAL 4 TIMES DAILY
Qty: 120 TABLET | Refills: 3 | Status: SHIPPED | OUTPATIENT
Start: 2020-04-20 | End: 2020-04-20 | Stop reason: SDUPTHER

## 2020-04-20 NOTE — TELEPHONE ENCOUNTER
called because medication was accidentally sent to [facility] instead of [facility]. I have cancelled her script at [facility] and called Xanax in to [facility].

## 2020-04-20 NOTE — TELEPHONE ENCOUNTER
PTS SPOUSE CALLED AND SAID PT WAS WAITING ON RX ALPRAZOLAM 1 MG    [facility]

## 2020-05-06 DIAGNOSIS — E55.9 VITAMIN D DEFICIENCY: ICD-10-CM

## 2020-05-06 RX ORDER — CYANOCOBALAMIN 1000 UG/ML
INJECTION, SOLUTION INTRAMUSCULAR; SUBCUTANEOUS
Qty: 2 ML | Refills: 3 | Status: SHIPPED | OUTPATIENT
Start: 2020-05-06 | End: 2020-08-31

## 2020-05-06 RX ORDER — ERGOCALCIFEROL 1.25 MG/1
CAPSULE ORAL
Qty: 12 CAPSULE | Refills: 3 | Status: SHIPPED | OUTPATIENT
Start: 2020-05-06 | End: 2020-10-14 | Stop reason: SDUPTHER

## 2020-05-06 RX ORDER — FLUDROCORTISONE ACETATE 0.1 MG/1
0.05 TABLET ORAL DAILY
Qty: 30 TABLET | Refills: 6 | Status: CANCELLED | OUTPATIENT
Start: 2020-05-06 | End: 2021-05-06

## 2020-05-06 NOTE — TELEPHONE ENCOUNTER
PATIENT HAS JÚNIOR DISEASE. SHE STATES SHE IS NOT FEELING WELL AND LOST APPETITE. SHE IS FEELING WEEK AND IS WANTING TO DISCUSS ISSUES WITH US. PATIENTS NUMBER [phone]

## 2020-05-06 NOTE — TELEPHONE ENCOUNTER
PATIENT IS CALLING STATING SHE DOES NOT REMEMBER IF SHE TOOK FLUDROCORTISONE MEDICATION THIS MORNING. SHE IS CALLING ON SUGGESTIONS ABOUT MEDICATION DOSAGE IF SHE DID TAKE IT OR DID NOT TAKE IT TODAY.  PATIENTS NUMBER [phone].

## 2020-05-06 NOTE — TELEPHONE ENCOUNTER
"Patient states she is not feeling well, patient states she feels \"lifeless.\"    Patient has not been feeling well for the past 2 months.  Patient feels very weak, can't eat has no appetite.  Patient states she has SOB but its because she suffers from anxiety and has panic attacks.  Patient states her doctor prescribe Xanax but has not taken it.    Patient denies:  Fever, Nausea, vomiting or chest pain.    During our telephone, patient started to cry states she feels depress and needs help taking her medications.  Patient states there are days when she wakes up and does not know what medication to take first, because she takes allot of meds.    Recommended for patient to contact her PCP and let them know she is not feeling well, see if they can see her in order to have a doctor assess her, patient states she will call her PCP today.    Informed patient to talk to her PCP about getting help with home health, patient states she will ask her PCP.      "

## 2020-05-07 ENCOUNTER — TELEMEDICINE (OUTPATIENT)
Dept: FAMILY MEDICINE CLINIC | Facility: CLINIC | Age: 65
End: 2020-05-07

## 2020-05-07 DIAGNOSIS — E89.0 POSTABLATIVE HYPOTHYROIDISM: ICD-10-CM

## 2020-05-07 DIAGNOSIS — R53.83 FATIGUE, UNSPECIFIED TYPE: Primary | ICD-10-CM

## 2020-05-07 DIAGNOSIS — IMO0001 UNCONTROLLED DIABETES MELLITUS TYPE 2 WITHOUT COMPLICATIONS: ICD-10-CM

## 2020-05-07 DIAGNOSIS — R53.1 WEAKNESS: ICD-10-CM

## 2020-05-07 DIAGNOSIS — E27.1 ADDISON'S DISEASE (HCC): ICD-10-CM

## 2020-05-07 DIAGNOSIS — F41.9 ANXIETY: ICD-10-CM

## 2020-05-07 PROCEDURE — 99213 OFFICE O/P EST LOW 20 MIN: CPT | Performed by: FAMILY MEDICINE

## 2020-05-07 RX ORDER — ALPRAZOLAM 1 MG/1
1 TABLET ORAL 4 TIMES DAILY
Qty: 120 TABLET | Refills: 5 | Status: SHIPPED | OUTPATIENT
Start: 2020-05-07 | End: 2020-05-18

## 2020-05-08 NOTE — PROGRESS NOTES
Subjective   Karen Rubio is a 64 y.o. female     Patient agrees to telemedicine visit with the use of audiovisual aids    Chief Complaint    Marked fatigue and weakness  Hypothyroidism  Nicholls's disease  Diabetes mellitus    History of Present Illness  Patient presents for telemedicine visit with a primary complaint of marked fatigue and generalized weakness.  This is causing her to be quite anxious because she does not know the reason why.  She has a history of diabetes mellitus, hypothyroidism and Shawn's disease as well as chronic generalized anxiety with panic disorder.  She is requesting a refill on her alprazolam.  She is due to see her endocrinologist at the end of this month.  She claims that she has been compliant with all of her medications.  She states that she does have trouble with her fludrocortisone because the tablets are so small and she is supposed to break it in half.    The following portions of the patient's history were reviewed and updated as appropriate: allergies, current medications, past social history and problem list    Review of Systems   Constitutional: Positive for fatigue and unexpected weight change. Negative for appetite change, chills, diaphoresis and fever.   Eyes: Negative for visual disturbance.   Respiratory: Negative for chest tightness and shortness of breath.    Cardiovascular: Negative for chest pain, palpitations and leg swelling.   Gastrointestinal: Negative for abdominal pain, diarrhea, nausea and vomiting.   Endocrine: Negative for polydipsia, polyphagia and polyuria.   Musculoskeletal: Positive for arthralgias, back pain and myalgias.   Skin: Negative for color change and rash.   Neurological: Positive for weakness. Negative for dizziness, tremors, light-headedness, numbness and headaches.   Hematological: Negative for adenopathy. Bruises/bleeds easily.   Psychiatric/Behavioral: Positive for dysphoric mood and sleep disturbance. Negative for agitation,  behavioral problems, confusion, decreased concentration and suicidal ideas. The patient is nervous/anxious.        Objective     There were no vitals filed for this visit.    Physical Exam   Constitutional: She is oriented to person, place, and time. She appears well-developed and well-nourished.   HENT:   Head: Normocephalic and atraumatic.   Neurological: She is alert and oriented to person, place, and time.   Psychiatric: Her speech is normal and behavior is normal. Her mood appears anxious. She exhibits a depressed mood.       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[facility])    Relevant Orders    Comprehensive Metabolic Panel    Uncontrolled diabetes mellitus type 2 without complications (CMS/[facility])    Relevant Orders    Hemoglobin A1c    Comprehensive Metabolic Panel    Hypothyroidism    Relevant Orders    TSH    T4, Free       Other    Anxiety    Relevant Medications    ALPRAZolam (XANAX) 1 MG tablet      Other Visit Diagnoses     Fatigue, unspecified type    -  Primary    Relevant Orders    Hemoglobin A1c    Comprehensive Metabolic Panel    TSH    T4, Free    Weakness        Relevant Orders    Hemoglobin A1c    Comprehensive Metabolic Panel    TSH    T4, Free        Time for telemedicine visit was 24 minutes

## 2020-05-08 NOTE — TELEPHONE ENCOUNTER
If she didn't take her fludrocortisone - her BP may be low. If she takes extra fludro it can cause elevation in BP. She can take 1/4 tablet and see how she feels. Her usual dose is 1/2 tablet daily so with 1/2 of that she should be safe even if she already took her dos ein am.

## 2020-05-12 ENCOUNTER — LAB (OUTPATIENT)
Dept: LAB | Facility: HOSPITAL | Age: 65
End: 2020-05-12

## 2020-05-12 DIAGNOSIS — IMO0001 UNCONTROLLED DIABETES MELLITUS TYPE 2 WITHOUT COMPLICATIONS: ICD-10-CM

## 2020-05-12 DIAGNOSIS — E89.0 POSTABLATIVE HYPOTHYROIDISM: ICD-10-CM

## 2020-05-12 DIAGNOSIS — R53.1 WEAKNESS: ICD-10-CM

## 2020-05-12 DIAGNOSIS — R53.83 FATIGUE, UNSPECIFIED TYPE: ICD-10-CM

## 2020-05-12 DIAGNOSIS — E27.1 ADDISON'S DISEASE (HCC): ICD-10-CM

## 2020-05-12 LAB
ALBUMIN SERPL-MCNC: 4.1 G/DL (ref 3.5–5.2)
ALBUMIN/GLOB SERPL: 1.6 G/DL
ALP SERPL-CCNC: 92 U/L (ref 39–117)
ALT SERPL W P-5'-P-CCNC: 24 U/L (ref 1–33)
ANION GAP SERPL CALCULATED.3IONS-SCNC: 13.5 MMOL/L (ref 5–15)
AST SERPL-CCNC: 27 U/L (ref 1–32)
BILIRUB SERPL-MCNC: 1.1 MG/DL (ref 0.2–1.2)
BUN BLD-MCNC: 6 MG/DL (ref 8–23)
BUN/CREAT SERPL: 8.3 (ref 7–25)
CALCIUM SPEC-SCNC: 9.7 MG/DL (ref 8.6–10.5)
CHLORIDE SERPL-SCNC: 99 MMOL/L (ref 98–107)
CO2 SERPL-SCNC: 30.5 MMOL/L (ref 22–29)
CREAT BLD-MCNC: 0.72 MG/DL (ref 0.57–1)
GFR SERPL CREATININE-BSD FRML MDRD: 81 ML/MIN/1.73
GLOBULIN UR ELPH-MCNC: 2.6 GM/DL
GLUCOSE BLD-MCNC: 151 MG/DL (ref 65–99)
HBA1C MFR BLD: 7.9 % (ref 4.8–5.6)
POTASSIUM BLD-SCNC: 3.5 MMOL/L (ref 3.5–5.2)
PROT SERPL-MCNC: 6.7 G/DL (ref 6–8.5)
SODIUM BLD-SCNC: 143 MMOL/L (ref 136–145)
T4 FREE SERPL-MCNC: 1.3 NG/DL (ref 0.93–1.7)
TSH SERPL DL<=0.05 MIU/L-ACNC: 1.73 UIU/ML (ref 0.27–4.2)

## 2020-05-12 PROCEDURE — 84443 ASSAY THYROID STIM HORMONE: CPT

## 2020-05-12 PROCEDURE — 80053 COMPREHEN METABOLIC PANEL: CPT

## 2020-05-12 PROCEDURE — 84439 ASSAY OF FREE THYROXINE: CPT

## 2020-05-12 PROCEDURE — 36415 COLL VENOUS BLD VENIPUNCTURE: CPT

## 2020-05-12 PROCEDURE — 83036 HEMOGLOBIN GLYCOSYLATED A1C: CPT

## 2020-05-15 DIAGNOSIS — F41.9 ANXIETY: ICD-10-CM

## 2020-05-18 RX ORDER — ALPRAZOLAM 1 MG/1
TABLET ORAL
Qty: 120 TABLET | Refills: 5 | Status: SHIPPED | OUTPATIENT
Start: 2020-05-18 | End: 2020-10-09 | Stop reason: SDUPTHER

## 2020-05-18 NOTE — TELEPHONE ENCOUNTER
Med was sent to [facility] May 7 but they switched to [facility] in April.

## 2020-05-19 ENCOUNTER — TELEPHONE (OUTPATIENT)
Dept: ENDOCRINOLOGY | Facility: CLINIC | Age: 65
End: 2020-05-19

## 2020-05-19 NOTE — TELEPHONE ENCOUNTER
PATIENT HAS NOT BEEN EATING AND IS HAVING ISSUES EATING. SHES BEEN DRINKING WATER AND JUICE BUT FEELS VERY WEAK AND FEELS LIFELESS. SHE NEEDS TO BE ADVISED ON WHAT SHE CAN DO TO HELP WEAKNESS. PHONE NUMBER [phone]

## 2020-05-19 NOTE — TELEPHONE ENCOUNTER
Returned patient call, Left detailed message on machine., advised that is it was a food issue, she could do meal replacement like ensure, or protien shakes etc.  Advised to call back if needs additional advise

## 2020-05-22 ENCOUNTER — OFFICE VISIT (OUTPATIENT)
Dept: ENDOCRINOLOGY | Facility: CLINIC | Age: 65
End: 2020-05-22

## 2020-05-22 VITALS
DIASTOLIC BLOOD PRESSURE: 80 MMHG | BODY MASS INDEX: 27.03 KG/M2 | HEART RATE: 90 BPM | OXYGEN SATURATION: 100 % | SYSTOLIC BLOOD PRESSURE: 118 MMHG | WEIGHT: 116.8 LBS | HEIGHT: 55 IN

## 2020-05-22 DIAGNOSIS — E89.0 POSTABLATIVE HYPOTHYROIDISM: ICD-10-CM

## 2020-05-22 DIAGNOSIS — E27.1 ADDISON'S DISEASE (HCC): ICD-10-CM

## 2020-05-22 DIAGNOSIS — E11.9 TYPE 2 DIABETES MELLITUS WITHOUT COMPLICATION, WITHOUT LONG-TERM CURRENT USE OF INSULIN (HCC): Primary | ICD-10-CM

## 2020-05-22 DIAGNOSIS — E55.9 VITAMIN D DEFICIENCY: ICD-10-CM

## 2020-05-22 LAB — GLUCOSE BLDC GLUCOMTR-MCNC: 271 MG/DL (ref 70–130)

## 2020-05-22 PROCEDURE — 82962 GLUCOSE BLOOD TEST: CPT | Performed by: INTERNAL MEDICINE

## 2020-05-22 PROCEDURE — 99214 OFFICE O/P EST MOD 30 MIN: CPT | Performed by: INTERNAL MEDICINE

## 2020-05-22 RX ORDER — FLUTICASONE PROPIONATE 50 MCG
1 SPRAY, SUSPENSION (ML) NASAL DAILY
Qty: 1 BOTTLE | Refills: 3 | Status: SHIPPED | OUTPATIENT
Start: 2020-05-22 | End: 2022-04-04 | Stop reason: SDUPTHER

## 2020-05-22 NOTE — PROGRESS NOTES
Chief complaint  Follow-up; Diabetes; and Hypothyroidism    Subjective   Karen Rubio is a 64 y.o. female is here today for follow-up.  Follow-up for hypothyroidism and [place]'s disease. HTN and Depression. Diabetes.   Adrenal insufficiency / [place]'s disease dx in 2004. She had significant hyperpigmentation and fatigue.   She was taking hydrocortisone and fludrocortisone. On presentation in Oct 2014 and following visit BP was elevated 180//100 and I have d/c fludrocortisone. BP stabilized.   Still has fatigue, glucose is higher. Last visit 9/2018- AM 15mg, decreased to 5mg in PM.    Diabetes mellitus type 2   She stopped metformin because of diarrhea. On Januvia 100  mg daily. She frequently forget her medications.     Hypothyroidism postablative. S/p I-131 therapy at age 14. She is taking levothyroxine 50 mcg.        She c/o multiple symptoms of fatigue and exhaustion, pain in the legs and back,increased sleepiness, inability to function, weight loss. She finds no motivation in getting up from bed or taking medications. Her main concern is memory problem and depression. SHe is feeling better about going to [employer] and resuming normal life.     Medications    Current Outpatient Medications:   •  albuterol sulfate  (90 Base) MCG/ACT inhaler, Inhale 2 puffs Every 4 (Four) Hours As Needed for Wheezing., Disp: 3.7 g, Rfl: 11  •  ALPRAZolam (XANAX) 1 MG tablet, TAKE 1 TABLET 4 TIMES DAILY., Disp: 120 tablet, Rfl: 5  •  cyanocobalamin 1000 MCG/ML injection, INJECT 1 ML INTRAMUSCARLY ONCE EVERY 2 WEEKS AS DIRECTED, Disp: 2 mL, Rfl: 3  •  fludrocortisone 0.1 MG tablet, Take 0.5 tablets by mouth Daily., Disp: 30 tablet, Rfl: 6  •  fluticasone (FLONASE) 50 MCG/ACT nasal spray, 1 spray into the nostril(s) as directed by provider Daily., Disp: 1 bottle, Rfl: 3  •  gabapentin (NEURONTIN) 300 MG capsule, Take 300 mg by mouth Every 8 (Eight) Hours., Disp: , Rfl:   •  hydrocortisone (CORTEF) 5 MG tablet, TAKE 4  TABLETS BY MOUTH EVERY DAY IN THE MORNING AND ALSO TAKE 1 TABLET EVERY DAY AT 3 PM. Take double dose in the event of illness., Disp: 500 tablet, Rfl: 3  •  levothyroxine (SYNTHROID, LEVOTHROID) 50 MCG tablet, Take 1 tablet by mouth Daily., Disp: 90 tablet, Rfl: 3  •  montelukast (SINGULAIR) 10 MG tablet, Take 1 tablet by mouth Daily., Disp: 30 tablet, Rfl: 11  •  ondansetron ODT (ZOFRAN ODT) 8 MG disintegrating tablet, Take 1 tablet by mouth Every 8 (Eight) Hours As Needed for Nausea or Vomiting., Disp: 15 tablet, Rfl: 2  •  oxyCODONE-acetaminophen (PERCOCET)  MG per tablet, Take 1 tablet by mouth Every 4 (Four) Hours As Needed for Moderate Pain ., Disp: 150 tablet, Rfl: 0  •  propranolol (INDERAL) 20 MG tablet, Take 1 tablet by mouth 2 (Two) Times a Day., Disp: 60 tablet, Rfl: 6  •  simvastatin (ZOCOR) 20 MG tablet, Take 1 tablet by mouth Every Night., Disp: 90 tablet, Rfl: 3  •  SITagliptin (JANUVIA) 100 MG tablet, Take 1 tablet by mouth Daily., Disp: 90 tablet, Rfl: 3  •  vitamin D (ERGOCALCIFEROL) 1.25 MG (52539 UT) capsule capsule, TAKE 1 CAPSULE BY MOUTH ONE TIME PER WEEK, Disp: 12 capsule, Rfl: 3    PMH  The following portions of the patient's history were reviewed and updated as appropriate: allergies, current medications, past family history, past medical history, past social history, past surgical history and problem list.    Review of systems  Review of Systems   Constitutional: Positive for appetite change (improved appetite. ) and unexpected weight change (weight loss). Negative for activity change, chills and diaphoresis.   HENT: Negative for congestion, ear pain, facial swelling, hearing loss, trouble swallowing and voice change.    Eyes: Positive for visual disturbance. Negative for redness and itching.   Cardiovascular: Negative for palpitations and leg swelling.   Gastrointestinal: Negative for abdominal distention, abdominal pain, constipation, nausea and vomiting.   Endocrine:        As  "listed in HPI   Genitourinary: Negative.    Musculoskeletal: Positive for arthralgias and back pain (radiating to the left leg. ). Negative for joint swelling, myalgias, neck pain and neck stiffness.   Skin: Negative.    Allergic/Immunologic: Negative.    Neurological: Positive for weakness, light-headedness, numbness and headaches. Negative for syncope.   Hematological: Negative.    Psychiatric/Behavioral: Positive for decreased concentration (memory loss) and sleep disturbance.   All other systems reviewed and are negative.      Physical exam  Objective   Blood pressure 118/80, pulse 90, height 132.1 cm (52\"), weight 53 kg (116 lb 12.8 oz), SpO2 100 %, not currently breastfeeding.   Physical Exam   Constitutional: She is oriented to person, place, and time. She appears well-developed and well-nourished.   HENT:   Head: Normocephalic and atraumatic.   Mouth/Throat: Mucous membranes are normal.   Eyes: Conjunctivae are normal.   Neck: No JVD present. No thyromegaly present.   The neck is supple and symmetric   Cardiovascular: Normal rate, regular rhythm and normal heart sounds.   Pulmonary/Chest: Effort normal and breath sounds normal.   Musculoskeletal: She exhibits deformity. She exhibits no edema.   Lymphadenopathy:     She has no cervical adenopathy.   Neurological: She is alert and oriented to person, place, and time. She has normal reflexes.   Skin: Skin is warm and dry. No rash noted.   Psychiatric: She has a normal mood and affect. Thought content normal.   Vitals reviewed.        LABS AND IMAGING  Office Visit on 05/22/2020   Component Date Value Ref Range Status   • Glucose 05/22/2020 271* 70 - 130 mg/dL Final   Lab on 05/12/2020   Component Date Value Ref Range Status   • Hemoglobin A1C 05/12/2020 7.90* 4.80 - 5.60 % Final   • Glucose 05/12/2020 151* 65 - 99 mg/dL Final   • BUN 05/12/2020 6* 8 - 23 mg/dL Final   • Creatinine 05/12/2020 0.72  0.57 - 1.00 mg/dL Final   • Sodium 05/12/2020 143  136 - 145 " mmol/L Final   • Potassium 05/12/2020 3.5  3.5 - 5.2 mmol/L Final   • Chloride 05/12/2020 99  98 - 107 mmol/L Final   • CO2 05/12/2020 30.5* 22.0 - 29.0 mmol/L Final   • Calcium 05/12/2020 9.7  8.6 - 10.5 mg/dL Final   • Total Protein 05/12/2020 6.7  6.0 - 8.5 g/dL Final   • Albumin 05/12/2020 4.10  3.50 - 5.20 g/dL Final   • ALT (SGPT) 05/12/2020 24  1 - 33 U/L Final   • AST (SGOT) 05/12/2020 27  1 - 32 U/L Final   • Alkaline Phosphatase 05/12/2020 92  39 - 117 U/L Final   • Total Bilirubin 05/12/2020 1.1  0.2 - 1.2 mg/dL Final   • eGFR Non African Amer 05/12/2020 81  >60 mL/min/1.73 Final   • Globulin 05/12/2020 2.6  gm/dL Final   • A/G Ratio 05/12/2020 1.6  g/dL Final   • BUN/Creatinine Ratio 05/12/2020 8.3  7.0 - 25.0 Final   • Anion Gap 05/12/2020 13.5  5.0 - 15.0 mmol/L Final   • TSH 05/12/2020 1.730  0.270 - 4.200 uIU/mL Final    TSH results may be falsely decreased if patient taking Biotin.   • Free T4 05/12/2020 1.30  0.93 - 1.70 ng/dL Final           Assessment  Assessment/Plan   1. Type 2 diabetes mellitus without complication, without long-term current use of insulin (CMS/[facility])    2. [place]'s disease (CMS/[facility])    3. Postablative hypothyroidism    4. Vitamin D deficiency      Orders Placed This Encounter   Procedures   • POCT Glucose     Plan    -Hydrocortisone dose: 20 mg in am and 5 mg in pm.  The general approach is to provide lowest dose which controls adrenal insufficiency  -add fludrocortisone low dose. In the past it was discontinued because of the hypertension. Her labs show mineralocorticoid deficiency pattern    -BP good today- continue lisinopril 10 mg daily, propranolol 20 mg BID>     -diabetes is controlled   -continue Januvia 100 mg daily     -continue Levothyroxine 50 mcg a day. Thyroid function is normal.     -meds refilled and reviewed. I have simplified the regimen.     - B12 continued, Vitamin D weekly     Follow-up in 3 months

## 2020-06-12 DIAGNOSIS — E27.1 ADDISON'S DISEASE (HCC): ICD-10-CM

## 2020-06-12 RX ORDER — HYDROCORTISONE 5 MG/1
TABLET ORAL
Qty: 150 TABLET | Refills: 4 | OUTPATIENT
Start: 2020-06-12

## 2020-06-15 ENCOUNTER — HOSPITAL ENCOUNTER (OUTPATIENT)
Dept: GENERAL RADIOLOGY | Facility: HOSPITAL | Age: 65
Discharge: HOME | End: 2020-06-15
Admitting: PHYSICIAN ASSISTANT

## 2020-06-15 ENCOUNTER — OFFICE VISIT (OUTPATIENT)
Dept: FAMILY MEDICINE CLINIC | Facility: CLINIC | Age: 65
End: 2020-06-15

## 2020-06-15 VITALS
TEMPERATURE: 98.1 F | SYSTOLIC BLOOD PRESSURE: 160 MMHG | WEIGHT: 117 LBS | OXYGEN SATURATION: 99 % | BODY MASS INDEX: 27.08 KG/M2 | HEIGHT: 55 IN | HEART RATE: 83 BPM | DIASTOLIC BLOOD PRESSURE: 77 MMHG

## 2020-06-15 DIAGNOSIS — I10 ESSENTIAL HYPERTENSION: ICD-10-CM

## 2020-06-15 DIAGNOSIS — L98.9 SKIN LESION: ICD-10-CM

## 2020-06-15 DIAGNOSIS — M79.671 RIGHT FOOT PAIN: ICD-10-CM

## 2020-06-15 DIAGNOSIS — M79.671 RIGHT FOOT PAIN: Primary | ICD-10-CM

## 2020-06-15 PROCEDURE — 73630 X-RAY EXAM OF FOOT: CPT

## 2020-06-15 PROCEDURE — 99214 OFFICE O/P EST MOD 30 MIN: CPT | Performed by: PHYSICIAN ASSISTANT

## 2020-06-15 RX ORDER — LISINOPRIL 10 MG/1
10 TABLET ORAL DAILY
Qty: 30 TABLET | Refills: 5 | Status: SHIPPED | OUTPATIENT
Start: 2020-06-15 | End: 2020-10-09 | Stop reason: SDUPTHER

## 2020-06-15 NOTE — PROGRESS NOTES
Subjective   Karen Rubio is a 64 y.o. female  Skin Problem (concerned about skin tag on abdomen that has been bleeding )      History of Present Illness  Patient presents today for evaluation treatment of 3 uncontrolled medical conditions.  States her blood pressures been staying elevated she was unable to tolerate last medication that she took.  She felt it was too strong.  She also is been continued to have pain in her right foot for the last 2 months after a fall that occurred at a local fast food restaurant and then again hitting it on a cabinet at her house.  Third issue is of a skin lesion on her lower abdomen that is been bleeding recently.  States it does not hurt is just bleeding.  The following portions of the patient's history were reviewed and updated as appropriate: allergies, current medications, past social history and problem list    Review of Systems   Constitutional: Positive for activity change. Negative for fatigue and unexpected weight change.   Respiratory: Negative for cough, chest tightness and shortness of breath.    Cardiovascular: Negative for chest pain, palpitations and leg swelling.   Gastrointestinal: Negative for nausea.   Musculoskeletal: Positive for arthralgias, gait problem and myalgias. Negative for joint swelling.   Skin: Positive for color change and wound. Negative for rash.   Neurological: Negative for dizziness, syncope, weakness, numbness and headaches.   Hematological: Negative.    Psychiatric/Behavioral: The patient is nervous/anxious.        Objective     Vitals:    06/15/20 1443   BP: 160/77   Pulse: 83   Temp: 98.1 °F (36.7 °C)   SpO2: 99%       Physical Exam   Constitutional: She is oriented to person, place, and time. She appears well-developed and well-nourished. No distress.   HENT:   Head: Normocephalic and atraumatic.   Neck: No JVD present.   Cardiovascular: Normal rate, regular rhythm, normal heart sounds and intact distal pulses.   No murmur  heard.  Pulmonary/Chest: Effort normal and breath sounds normal. No respiratory distress. She exhibits no tenderness.   Abdominal: Soft. She exhibits no distension. There is no tenderness.   Musculoskeletal: She exhibits tenderness. She exhibits no edema.   Markedly tender to examination over base of first digit right foot into head of first metatarsal.   Neurological: She is alert and oriented to person, place, and time.   Neurovascular status intact right lower extremity.   Skin: Skin is warm. She is not diaphoretic. There is erythema. No pallor.   Half centimeter size circular somewhat flat skin lesion on mid lower abdomen with stuck on appearance with a stalk-like base very friable, currently bleeding.   Psychiatric: Her speech is normal and behavior is normal. Judgment and thought content normal. Her mood appears anxious.   Nursing note and vitals reviewed.      Assessment/Plan     Karen was seen today for skin problem.    Diagnoses and all orders for this visit:    Right foot pain  -     Cancel: XR Foot 2 View Right; Future    Skin lesion  -     Ambulatory Referral to Dermatology    Essential hypertension    Other orders  -     lisinopril (PRINIVIL,ZESTRIL) 10 MG tablet; Take 1 tablet by mouth Daily. For BP    Order x-ray of right foot for further evaluation, refer to dermatology, sterile bandage applied over skin lesion.  Recheck blood pressure in 1 month

## 2020-06-23 DIAGNOSIS — E27.1 ADDISON'S DISEASE (HCC): ICD-10-CM

## 2020-06-23 RX ORDER — HYDROCORTISONE 5 MG/1
TABLET ORAL
Qty: 500 TABLET | Refills: 3 | Status: SHIPPED | OUTPATIENT
Start: 2020-06-23 | End: 2020-07-23 | Stop reason: SDUPTHER

## 2020-06-23 NOTE — TELEPHONE ENCOUNTER
RX refill reqeust from [facility] for Hydrocortisone 5 mg RX E- Scripted

## 2020-07-12 DIAGNOSIS — E27.1 ADDISON'S DISEASE (HCC): ICD-10-CM

## 2020-07-13 RX ORDER — HYDROCORTISONE 5 MG/1
TABLET ORAL
Qty: 200 TABLET | Refills: 9 | OUTPATIENT
Start: 2020-07-13

## 2020-07-23 ENCOUNTER — OFFICE VISIT (OUTPATIENT)
Dept: ENDOCRINOLOGY | Facility: CLINIC | Age: 65
End: 2020-07-23

## 2020-07-23 VITALS
WEIGHT: 109 LBS | BODY MASS INDEX: 25.22 KG/M2 | HEIGHT: 55 IN | DIASTOLIC BLOOD PRESSURE: 80 MMHG | HEART RATE: 85 BPM | OXYGEN SATURATION: 99 % | SYSTOLIC BLOOD PRESSURE: 130 MMHG

## 2020-07-23 DIAGNOSIS — L98.9 SKIN LESION: ICD-10-CM

## 2020-07-23 DIAGNOSIS — E27.1 ADDISON'S DISEASE (HCC): ICD-10-CM

## 2020-07-23 DIAGNOSIS — E11.9 TYPE 2 DIABETES MELLITUS WITHOUT COMPLICATION, WITHOUT LONG-TERM CURRENT USE OF INSULIN (HCC): Primary | ICD-10-CM

## 2020-07-23 DIAGNOSIS — E89.0 POSTABLATIVE HYPOTHYROIDISM: ICD-10-CM

## 2020-07-23 DIAGNOSIS — E55.9 VITAMIN D DEFICIENCY: ICD-10-CM

## 2020-07-23 LAB
EXPIRATION DATE: NORMAL
EXPIRATION DATE: NORMAL
GLUCOSE BLDC GLUCOMTR-MCNC: 93 MG/DL (ref 70–130)
HBA1C MFR BLD: 7.6 %
Lab: NORMAL
Lab: NORMAL

## 2020-07-23 PROCEDURE — 83036 HEMOGLOBIN GLYCOSYLATED A1C: CPT | Performed by: INTERNAL MEDICINE

## 2020-07-23 PROCEDURE — 99215 OFFICE O/P EST HI 40 MIN: CPT | Performed by: INTERNAL MEDICINE

## 2020-07-23 PROCEDURE — 82947 ASSAY GLUCOSE BLOOD QUANT: CPT | Performed by: INTERNAL MEDICINE

## 2020-07-23 RX ORDER — HYDROCORTISONE 5 MG/1
TABLET ORAL
Qty: 500 TABLET | Refills: 3 | Status: SHIPPED | OUTPATIENT
Start: 2020-07-23 | End: 2020-09-08

## 2020-07-23 RX ORDER — SIMVASTATIN 20 MG
20 TABLET ORAL NIGHTLY
Qty: 90 TABLET | Refills: 3 | Status: SHIPPED | OUTPATIENT
Start: 2020-07-23 | End: 2020-10-14 | Stop reason: SDUPTHER

## 2020-07-23 RX ORDER — FLUDROCORTISONE ACETATE 0.1 MG/1
0.05 TABLET ORAL DAILY
Qty: 30 TABLET | Refills: 6 | Status: SHIPPED | OUTPATIENT
Start: 2020-07-23 | End: 2020-10-14 | Stop reason: SDUPTHER

## 2020-07-23 RX ORDER — CARBOXYMETHYLCELLULOSE SODIUM 5 MG/ML
1 SOLUTION/ DROPS OPHTHALMIC 3 TIMES DAILY PRN
Qty: 30 ML | Refills: 11 | Status: SHIPPED | OUTPATIENT
Start: 2020-07-23

## 2020-07-23 RX ORDER — LEVOTHYROXINE SODIUM 0.05 MG/1
50 TABLET ORAL DAILY
Qty: 90 TABLET | Refills: 3 | Status: SHIPPED | OUTPATIENT
Start: 2020-07-23 | End: 2020-10-14 | Stop reason: SDUPTHER

## 2020-07-23 NOTE — PROGRESS NOTES
Chief complaint  Diabetes (DM2 f/u ); Postablative hypothyroidism (f/u ); and [place]'s disease (f/u )    Subjective   Karen Rubio is a 64 y.o. female is here today for follow-up.  Follow-up for hypothyroidism and [place]'s disease. HTN and Depression. Diabetes.   Adrenal insufficiency / [place]'s disease dx in 2004. She had significant hyperpigmentation and fatigue.   She was taking hydrocortisone and fludrocortisone. On presentation in Oct 2014 and following visit BP was elevated 180//100 and I have d/c fludrocortisone. BP stabilized.                                                                                                                        Diabetes mellitus type 2   She stopped metformin because of diarrhea. On Januvia 100  mg daily. She is not taking it since her appetite was decreased.     Hypothyroidism postablative. S/p I-131 therapy at age 14. She is taking levothyroxine 50 mcg.        She c/o multiple symptoms of fatigue and exhaustion, pain in the legs and back,increased sleepiness, inability to function, weight loss. She reported loss of taste and decreased appetite after chemo, weight loss. She feels that everything tastes as carton. Memory loss and pain in the wrists. She also reported increased in size lesion on the face and new growth on the right side of the abdomen which has serosanguinous discharge.     Medications    Current Outpatient Medications:   •  albuterol sulfate  (90 Base) MCG/ACT inhaler, Inhale 2 puffs Every 4 (Four) Hours As Needed for Wheezing., Disp: 3.7 g, Rfl: 11  •  ALPRAZolam (XANAX) 1 MG tablet, TAKE 1 TABLET 4 TIMES DAILY., Disp: 120 tablet, Rfl: 5  •  cyanocobalamin 1000 MCG/ML injection, INJECT 1 ML INTRAMUSCARLY ONCE EVERY 2 WEEKS AS DIRECTED, Disp: 2 mL, Rfl: 3  •  fludrocortisone 0.1 MG tablet, Take 0.5 tablets by mouth Daily., Disp: 30 tablet, Rfl: 6  •  fluticasone (FLONASE) 50 MCG/ACT nasal spray, 1 spray into the nostril(s) as directed by  provider Daily., Disp: 1 bottle, Rfl: 3  •  gabapentin (NEURONTIN) 300 MG capsule, Take 300 mg by mouth Every 8 (Eight) Hours., Disp: , Rfl:   •  hydrocortisone (CORTEF) 5 MG tablet, TAKE 4 TABLETS BY MOUTH EVERY DAY IN THE MORNING AND ALSO TAKE 1 TABLET EVERY DAY AT 3 PM. Take double dose in the event of illness., Disp: 500 tablet, Rfl: 3  •  levothyroxine (SYNTHROID, LEVOTHROID) 50 MCG tablet, Take 1 tablet by mouth Daily., Disp: 90 tablet, Rfl: 3  •  lisinopril (PRINIVIL,ZESTRIL) 10 MG tablet, Take 1 tablet by mouth Daily. For BP, Disp: 30 tablet, Rfl: 5  •  montelukast (SINGULAIR) 10 MG tablet, Take 1 tablet by mouth Daily., Disp: 30 tablet, Rfl: 11  •  ondansetron ODT (ZOFRAN ODT) 8 MG disintegrating tablet, Take 1 tablet by mouth Every 8 (Eight) Hours As Needed for Nausea or Vomiting., Disp: 15 tablet, Rfl: 2  •  oxyCODONE-acetaminophen (PERCOCET)  MG per tablet, Take 1 tablet by mouth Every 4 (Four) Hours As Needed for Moderate Pain ., Disp: 150 tablet, Rfl: 0  •  propranolol (INDERAL) 20 MG tablet, Take 1 tablet by mouth 2 (Two) Times a Day., Disp: 60 tablet, Rfl: 6  •  simvastatin (ZOCOR) 20 MG tablet, Take 1 tablet by mouth Every Night., Disp: 90 tablet, Rfl: 3  •  SITagliptin (JANUVIA) 100 MG tablet, Take 1 tablet by mouth Daily., Disp: 90 tablet, Rfl: 3  •  vitamin D (ERGOCALCIFEROL) 1.25 MG (17930 UT) capsule capsule, TAKE 1 CAPSULE BY MOUTH ONE TIME PER WEEK, Disp: 12 capsule, Rfl: 3    PMH  The following portions of the patient's history were reviewed and updated as appropriate: allergies, current medications, past family history, past medical history, past social history, past surgical history and problem list.    Review of systems  Review of Systems   Constitutional: Positive for appetite change (improved appetite. ) and unexpected weight change (weight loss). Negative for activity change, chills and diaphoresis.   HENT: Negative for congestion, ear pain, facial swelling, hearing loss, trouble  "swallowing and voice change.    Eyes: Positive for visual disturbance. Negative for redness and itching.   Cardiovascular: Negative for palpitations and leg swelling.   Gastrointestinal: Negative for abdominal distention, abdominal pain, constipation, nausea and vomiting.   Endocrine:        As listed in HPI   Genitourinary: Negative.    Musculoskeletal: Positive for arthralgias and back pain (radiating to the left leg. ). Negative for joint swelling, myalgias, neck pain and neck stiffness.   Skin: Negative.    Allergic/Immunologic: Negative.    Neurological: Positive for weakness, light-headedness, numbness and headaches. Negative for syncope.   Hematological: Negative.    Psychiatric/Behavioral: Positive for decreased concentration (memory loss) and sleep disturbance.   All other systems reviewed and are negative.      Physical exam  Objective   Blood pressure 130/80, pulse 85, height 132.1 cm (52\"), weight 49.4 kg (109 lb), SpO2 99 %, not currently breastfeeding.   Physical Exam   Constitutional: She is oriented to person, place, and time. She appears well-developed and well-nourished.   HENT:   Head: Normocephalic and atraumatic.   Mouth/Throat: Mucous membranes are normal.   Eyes: Conjunctivae are normal.   Neck:   The neck is supple and symmetric   Cardiovascular: Normal rate, regular rhythm and normal heart sounds.   Pulmonary/Chest: Effort normal and breath sounds normal.   Musculoskeletal: She exhibits deformity. She exhibits no edema.   Neurological: She is alert and oriented to person, place, and time. She has normal reflexes.   Skin: Skin is warm and dry. No rash noted.   Psychiatric: She has a normal mood and affect. Thought content normal.   Vitals reviewed.        LABS AND IMAGING  Office Visit on 07/23/2020   Component Date Value Ref Range Status   • Glucose 07/23/2020 93  70 - 130 mg/dL Final   • Lot Number 07/23/2020 [id number]   Final   • Expiration Date 07/23/2020 10/11/20   Final   • Hemoglobin " A1C 07/23/2020 7.6  % Final   • Lot Number 07/23/2020 [id number]   Final   • Expiration Date 07/23/2020 03/11/23   Final           Assessment  Assessment/Plan   1. Type 2 diabetes mellitus without complication, without long-term current use of insulin (CMS/[facility])    2. [place]'s disease (CMS/[facility])    3. Postablative hypothyroidism      Orders Placed This Encounter   Procedures   • POC Glucose Fingerstick   • POC Glycosylated Hemoglobin (Hb A1C)     Plan    -Hydrocortisone dose: 20 mg in am and 5 mg in pm.    -cont fludrocortisone low dose. In the past it was discontinued because of the hypertension. Her labs show mineralocorticoid deficiency pattern    -BP good today- continue lisinopril 10 mg daily, propranolol 20 mg BID>     -diabetes is controlled and improved with weight loss  -continue Januvia 100 mg daily, encouraged to continue taking it as the glucose is higher.     -continue Levothyroxine 50 mcg a day. Thyroid function was normal.     -meds refilled and reviewed. I have simplified the regimen.     - B12 continued, Vitamin D weekly continued.     I have sent referral to dermatologist for evaluation of skin lesions.      Follow-up in 3 months     33     min  of  41 min face-to-face visit time spent for coordination of care and counselling regarding identified problems as outlined in the objective, assessment and discussion portions of the documentation.

## 2020-08-07 ENCOUNTER — TELEPHONE (OUTPATIENT)
Dept: ENDOCRINOLOGY | Facility: CLINIC | Age: 65
End: 2020-08-07

## 2020-08-07 RX ORDER — MUPIROCIN CALCIUM 20 MG/G
CREAM TOPICAL 2 TIMES DAILY
Qty: 30 G | Refills: 0 | Status: SHIPPED | OUTPATIENT
Start: 2020-08-07 | End: 2021-06-25

## 2020-08-07 NOTE — TELEPHONE ENCOUNTER
Spoke with pt and told her the info below. Pt states her shirt caught it and it is hanging. I did tell pt she may have to go to an urgent care, pt states she is too afraid to go there. Pt states her PCP saw it and they think it looked fine. Pt is insisting to know what she can clean it with per Dr Morales. I spoke with Dr Morales and she said pt could clean it with hydrogen peroxide and we will send bactroban ointment in to prevent infection. Pt understood and stated she would call Monday to see if they could get her in sooner.

## 2020-08-07 NOTE — TELEPHONE ENCOUNTER
PT HAS A SPOT THAT IS ON HER SIDE/UNDER HER ARM. SHE HAS AN APPOINTMENT W/ DR. SINHA ON THE 13TH.   BUT PT THINKS SHE HAS ACCIDENTALLY OPENED THE WOUND, STATES THAT IT LOOKS AWEFUL.  PT IS CONCERNED ABOUT IT.    PLEASE

## 2020-08-07 NOTE — TELEPHONE ENCOUNTER
I thought we have talked with her that she needs to see dermatologist  For her skin lesions. DR Lowery is dermatologist? I would advice to keep this appointment and call them Monday and check if earlier appointment available.

## 2020-08-10 RX ORDER — SYRINGE WITH NEEDLE, 1 ML 25GX5/8"
SYRINGE, EMPTY DISPOSABLE MISCELLANEOUS
Refills: 0 | OUTPATIENT
Start: 2020-08-10

## 2020-08-10 RX ORDER — CETIRIZINE HYDROCHLORIDE 10 MG/1
TABLET ORAL
Qty: 30 TABLET | Refills: 9 | Status: SHIPPED | OUTPATIENT
Start: 2020-08-10 | End: 2021-12-30

## 2020-08-11 ENCOUNTER — CLINICAL SUPPORT (OUTPATIENT)
Dept: ENDOCRINOLOGY | Facility: CLINIC | Age: 65
End: 2020-08-11

## 2020-08-11 DIAGNOSIS — E53.8 COBALAMIN DEFICIENCY: ICD-10-CM

## 2020-08-11 PROCEDURE — 96372 THER/PROPH/DIAG INJ SC/IM: CPT | Performed by: INTERNAL MEDICINE

## 2020-08-11 RX ORDER — CYANOCOBALAMIN 1000 UG/ML
1000 INJECTION, SOLUTION INTRAMUSCULAR; SUBCUTANEOUS
Status: DISCONTINUED | OUTPATIENT
Start: 2020-08-11 | End: 2021-01-05

## 2020-08-11 RX ADMIN — CYANOCOBALAMIN 1000 MCG: 1000 INJECTION, SOLUTION INTRAMUSCULAR; SUBCUTANEOUS at 16:56

## 2020-08-31 RX ORDER — CYANOCOBALAMIN 1000 UG/ML
INJECTION, SOLUTION INTRAMUSCULAR; SUBCUTANEOUS
Qty: 2 ML | Refills: 3 | Status: SHIPPED | OUTPATIENT
Start: 2020-08-31 | End: 2021-02-09 | Stop reason: SDUPTHER

## 2020-09-07 DIAGNOSIS — E27.1 ADDISON'S DISEASE (HCC): ICD-10-CM

## 2020-09-08 RX ORDER — SYRINGE WITH NEEDLE, 1 ML 25GX5/8"
SYRINGE, EMPTY DISPOSABLE MISCELLANEOUS
Qty: 4 EACH | Refills: 3 | Status: SHIPPED | OUTPATIENT
Start: 2020-09-08 | End: 2020-12-29

## 2020-09-08 RX ORDER — HYDROCORTISONE 5 MG/1
TABLET ORAL
Qty: 200 TABLET | Refills: 3 | Status: SHIPPED | OUTPATIENT
Start: 2020-09-08 | End: 2020-10-14 | Stop reason: SDUPTHER

## 2020-09-25 ENCOUNTER — TELEPHONE (OUTPATIENT)
Dept: FAMILY MEDICINE CLINIC | Facility: CLINIC | Age: 65
End: 2020-09-25

## 2020-09-25 NOTE — TELEPHONE ENCOUNTER
Caller: Karen Rubio    Relationship to patient: Self    Best call back number: [phone]     Patient is needing: Patient states she has a spot on her stomach in which she needs to have checked by Dr Thomas. Patient stated she would like to schedule an appointment as soon as possible and preferably next week. Patient states when she removed her bandage the spot begins to bleed.

## 2020-09-28 ENCOUNTER — TELEPHONE (OUTPATIENT)
Dept: FAMILY MEDICINE CLINIC | Facility: CLINIC | Age: 65
End: 2020-09-28

## 2020-09-28 NOTE — TELEPHONE ENCOUNTER
PT. SEE'S SHANNAN.  PT. IS CALLING RE. HER , STATED IT IS PERSONAL; WANTING TO TALK W/SHANNAN RE. THIS.    PT. CAN BE REACHED @ [phone].

## 2020-10-09 ENCOUNTER — OFFICE VISIT (OUTPATIENT)
Dept: FAMILY MEDICINE CLINIC | Facility: CLINIC | Age: 65
End: 2020-10-09

## 2020-10-09 VITALS
WEIGHT: 111 LBS | OXYGEN SATURATION: 99 % | DIASTOLIC BLOOD PRESSURE: 80 MMHG | BODY MASS INDEX: 28.86 KG/M2 | SYSTOLIC BLOOD PRESSURE: 128 MMHG | HEART RATE: 97 BPM | TEMPERATURE: 98 F

## 2020-10-09 DIAGNOSIS — F41.9 ANXIETY: Primary | ICD-10-CM

## 2020-10-09 DIAGNOSIS — I10 ESSENTIAL HYPERTENSION: ICD-10-CM

## 2020-10-09 PROCEDURE — 99214 OFFICE O/P EST MOD 30 MIN: CPT | Performed by: FAMILY MEDICINE

## 2020-10-09 RX ORDER — LISINOPRIL 10 MG/1
10 TABLET ORAL DAILY
Qty: 30 TABLET | Refills: 5 | Status: SHIPPED | OUTPATIENT
Start: 2020-10-09 | End: 2020-10-14 | Stop reason: SDUPTHER

## 2020-10-09 RX ORDER — ALPRAZOLAM 1 MG/1
1 TABLET ORAL 4 TIMES DAILY
Qty: 120 TABLET | Refills: 5 | Status: SHIPPED | OUTPATIENT
Start: 2020-10-09 | End: 2021-04-28 | Stop reason: SDUPTHER

## 2020-10-12 NOTE — PROGRESS NOTES
Subjective   Karen Rubio is a 64 y.o. female    Chief Complaint    Anxiety  Hypertension  Situational depression    History of Present Illness  Patient presents today for recheck regarding anxiety and hypertension.  She is due for refills on her medications.  Her symptoms have gotten much worse as she and her  are estranged.  She is very depressed about this.  She feels he is on too many medications.  Extended discussion with the patient today to allow her to vent.    30-minute face-to-face visit with patient with greater than 50% of the time spent discussing patient's anxiety and depression and control of the symptoms.    The following portions of the patient's history were reviewed and updated as appropriate: allergies, current medications, past social history and problem list    Review of Systems   Constitutional: Negative for appetite change, fatigue and unexpected weight change.   Respiratory: Negative for cough, chest tightness and shortness of breath.    Cardiovascular: Negative for chest pain, palpitations and leg swelling.   Gastrointestinal: Negative for abdominal pain, diarrhea and nausea.   Skin: Negative for color change and rash.   Neurological: Negative for dizziness, tremors, syncope, weakness, light-headedness and headaches.   Psychiatric/Behavioral: Positive for dysphoric mood and sleep disturbance. Negative for agitation, behavioral problems, confusion, decreased concentration and suicidal ideas. The patient is nervous/anxious.        Objective     Vitals:    10/09/20 1554   BP: 128/80   Pulse: 97   Temp: 98 °F (36.7 °C)   SpO2: 99%       Physical Exam  Vitals signs and nursing note reviewed.   Constitutional:       Appearance: She is well-developed.   Neck:      Vascular: No JVD.   Cardiovascular:      Rate and Rhythm: Normal rate and regular rhythm.      Pulses: Normal pulses.      Heart sounds: Normal heart sounds. No murmur.   Pulmonary:      Effort: Pulmonary effort is normal. No  respiratory distress.      Breath sounds: Normal breath sounds.   Abdominal:      General: Bowel sounds are normal.      Palpations: Abdomen is soft.      Tenderness: There is no abdominal tenderness.   Skin:     General: Skin is warm and dry.   Neurological:      General: No focal deficit present.      Mental Status: She is oriented to person, place, and time.   Psychiatric:         Mood and Affect: Mood is anxious and depressed. Affect is tearful.         Speech: Speech is rapid and pressured.         Assessment/Plan   Problem List Items Addressed This Visit        Cardiovascular and Mediastinum    Hypertension    Relevant Medications    lisinopril (PRINIVIL,ZESTRIL) 10 MG tablet       Other    Anxiety - Primary    Relevant Medications    ALPRAZolam (XANAX) 1 MG tablet

## 2020-10-14 ENCOUNTER — LAB REQUISITION (OUTPATIENT)
Dept: LAB | Facility: HOSPITAL | Age: 65
End: 2020-10-14

## 2020-10-14 ENCOUNTER — OFFICE VISIT (OUTPATIENT)
Dept: ENDOCRINOLOGY | Facility: CLINIC | Age: 65
End: 2020-10-14

## 2020-10-14 VITALS
HEIGHT: 55 IN | SYSTOLIC BLOOD PRESSURE: 160 MMHG | HEART RATE: 77 BPM | BODY MASS INDEX: 26.04 KG/M2 | OXYGEN SATURATION: 98 % | DIASTOLIC BLOOD PRESSURE: 82 MMHG | TEMPERATURE: 97.8 F | WEIGHT: 112.5 LBS

## 2020-10-14 DIAGNOSIS — E89.0 POSTABLATIVE HYPOTHYROIDISM: ICD-10-CM

## 2020-10-14 DIAGNOSIS — E53.8 B12 DEFICIENCY: ICD-10-CM

## 2020-10-14 DIAGNOSIS — L98.9 SKIN LESION: ICD-10-CM

## 2020-10-14 DIAGNOSIS — E78.2 MIXED HYPERLIPIDEMIA: ICD-10-CM

## 2020-10-14 DIAGNOSIS — E11.9 TYPE 2 DIABETES MELLITUS WITHOUT COMPLICATION, WITHOUT LONG-TERM CURRENT USE OF INSULIN (HCC): Primary | ICD-10-CM

## 2020-10-14 DIAGNOSIS — Z00.00 ROUTINE GENERAL MEDICAL EXAMINATION AT A HEALTH CARE FACILITY: ICD-10-CM

## 2020-10-14 DIAGNOSIS — E55.9 VITAMIN D DEFICIENCY: ICD-10-CM

## 2020-10-14 DIAGNOSIS — E27.1 ADDISON'S DISEASE (HCC): ICD-10-CM

## 2020-10-14 DIAGNOSIS — I10 ESSENTIAL HYPERTENSION: ICD-10-CM

## 2020-10-14 DIAGNOSIS — E11.9 CONTROLLED TYPE 2 DIABETES MELLITUS WITHOUT COMPLICATION, WITHOUT LONG-TERM CURRENT USE OF INSULIN (HCC): ICD-10-CM

## 2020-10-14 LAB
EXPIRATION DATE: ABNORMAL
EXPIRATION DATE: NORMAL
GLUCOSE BLDC GLUCOMTR-MCNC: 154 MG/DL (ref 70–130)
HBA1C MFR BLD: 7.2 %
Lab: ABNORMAL
Lab: NORMAL

## 2020-10-14 PROCEDURE — 99214 OFFICE O/P EST MOD 30 MIN: CPT | Performed by: INTERNAL MEDICINE

## 2020-10-14 PROCEDURE — 82947 ASSAY GLUCOSE BLOOD QUANT: CPT | Performed by: INTERNAL MEDICINE

## 2020-10-14 PROCEDURE — 96372 THER/PROPH/DIAG INJ SC/IM: CPT | Performed by: INTERNAL MEDICINE

## 2020-10-14 PROCEDURE — 83036 HEMOGLOBIN GLYCOSYLATED A1C: CPT | Performed by: INTERNAL MEDICINE

## 2020-10-14 RX ORDER — PROPRANOLOL HYDROCHLORIDE 20 MG/1
20 TABLET ORAL 2 TIMES DAILY
Qty: 60 TABLET | Refills: 6 | Status: SHIPPED | OUTPATIENT
Start: 2020-10-14 | End: 2021-01-05

## 2020-10-14 RX ORDER — ERGOCALCIFEROL 1.25 MG/1
CAPSULE ORAL
Qty: 12 CAPSULE | Refills: 3 | Status: SHIPPED | OUTPATIENT
Start: 2020-10-14 | End: 2021-03-08 | Stop reason: SDUPTHER

## 2020-10-14 RX ORDER — LEVOTHYROXINE SODIUM 0.05 MG/1
50 TABLET ORAL DAILY
Qty: 90 TABLET | Refills: 3 | Status: SHIPPED | OUTPATIENT
Start: 2020-10-14 | End: 2021-06-25 | Stop reason: SDUPTHER

## 2020-10-14 RX ORDER — DICLOFENAC SODIUM 75 MG/1
TABLET, DELAYED RELEASE ORAL
COMMUNITY
Start: 2020-10-09

## 2020-10-14 RX ORDER — FLUDROCORTISONE ACETATE 0.1 MG/1
0.05 TABLET ORAL DAILY
Qty: 45 TABLET | Refills: 6 | Status: SHIPPED | OUTPATIENT
Start: 2020-10-14 | End: 2021-01-05

## 2020-10-14 RX ORDER — CYANOCOBALAMIN 1000 UG/ML
1000 INJECTION, SOLUTION INTRAMUSCULAR; SUBCUTANEOUS
Status: DISCONTINUED | OUTPATIENT
Start: 2020-10-14 | End: 2021-01-05

## 2020-10-14 RX ORDER — SIMVASTATIN 20 MG
20 TABLET ORAL NIGHTLY
Qty: 90 TABLET | Refills: 3 | Status: SHIPPED | OUTPATIENT
Start: 2020-10-14 | End: 2021-06-25 | Stop reason: SDUPTHER

## 2020-10-14 RX ORDER — LACTULOSE 10 G/15ML
SOLUTION ORAL
COMMUNITY
Start: 2020-10-09

## 2020-10-14 RX ORDER — HYDROCORTISONE 5 MG/1
TABLET ORAL
Qty: 200 TABLET | Refills: 3 | Status: SHIPPED | OUTPATIENT
Start: 2020-10-14 | End: 2021-06-25 | Stop reason: SDUPTHER

## 2020-10-14 RX ORDER — LISINOPRIL 10 MG/1
10 TABLET ORAL DAILY
Qty: 90 TABLET | Refills: 3 | Status: SHIPPED | OUTPATIENT
Start: 2020-10-14 | End: 2021-06-25 | Stop reason: SDUPTHER

## 2020-10-14 RX ADMIN — CYANOCOBALAMIN 1000 MCG: 1000 INJECTION, SOLUTION INTRAMUSCULAR; SUBCUTANEOUS at 12:23

## 2020-10-14 NOTE — PROGRESS NOTES
"Chief complaint  Diabetes (Follow Up) and Hypothyroidism    Subjective   Karen Rubio is a 64 y.o. female is here today for follow-up.  Follow-up for hypothyroidism and [place]'s disease. HTN and Depression. Diabetes.   Adrenal insufficiency / [place]'s disease dx in 2004. She had significant hyperpigmentation and fatigue.   She was taking hydrocortisone and fludrocortisone.                                                                                                                      Diabetes mellitus type 2   She stopped metformin because of diarrhea. On Januvia 100  mg daily.     Hypothyroidism postablative. S/p I-131 therapy at age 14. She is taking levothyroxine 50 mcg.       Patient reported increased stress. Her  left her and started using drugs. She has no help with the care and had her friend brought her .She has no resources and will have to leave apartment soon. She is crying during the exam and reported that she is just surviving. Right abdominal skin lesion continues to bleed and increased in size. She said that due to marital problems her  dint take her to dermatology appointment.   She reported depression, extreme fatigue, weakness, lack of appetite and anxiety. She is taking her medications.      Medications    Current Outpatient Medications:   •  albuterol sulfate  (90 Base) MCG/ACT inhaler, Inhale 2 puffs Every 4 (Four) Hours As Needed for Wheezing., Disp: 3.7 g, Rfl: 11  •  ALPRAZolam (XANAX) 1 MG tablet, Take 1 tablet by mouth 4 (Four) Times a Day., Disp: 120 tablet, Rfl: 5  •  B-D 3CC LUER-JELLY SYR 25GX1\" 25G X 1\" 3 ML misc, USE AS DIRECTED FOR B12 INJECTIONS, Disp: 4 each, Rfl: 3  •  carboxymethylcellulose (Lubricant Eye Drops) 0.5 % solution, Administer 1 drop to both eyes 3 (Three) Times a Day As Needed for Dry Eyes., Disp: 30 mL, Rfl: 11  •  cetirizine (zyrTEC) 10 MG tablet, TAKE 1 TABLET BY MOUTH EVERY DAY, Disp: 30 tablet, Rfl: 9  •  cyanocobalamin 1000 MCG/ML " injection, INJECT 1 ML INTRAMUSCUARLY ONCE EVERY 2 WEEKS AS DIRECTED, Disp: 2 mL, Rfl: 3  •  diclofenac (VOLTAREN) 1 % gel gel, , Disp: , Rfl:   •  diclofenac (VOLTAREN) 75 MG EC tablet, , Disp: , Rfl:   •  fludrocortisone 0.1 MG tablet, Take 0.5 tablets by mouth Daily., Disp: 45 tablet, Rfl: 6  •  fluticasone (FLONASE) 50 MCG/ACT nasal spray, 1 spray into the nostril(s) as directed by provider Daily., Disp: 1 bottle, Rfl: 3  •  gabapentin (NEURONTIN) 300 MG capsule, Take 300 mg by mouth Every 8 (Eight) Hours., Disp: , Rfl:   •  hydrocortisone (CORTEF) 5 MG tablet, TAKE 4 TABLETS BY MOUTH EVERY DAY IN THE MORNING AND ALSO TAKE 1 TABLET EVERY DAY AT 3 PM (TAKE DOUBLE DOSE IN THE EVENT OF ILLNESS), Disp: 200 tablet, Rfl: 3  •  lactulose (CHRONULAC) 10 GM/15ML solution, , Disp: , Rfl:   •  levothyroxine (SYNTHROID, LEVOTHROID) 50 MCG tablet, Take 1 tablet by mouth Daily., Disp: 90 tablet, Rfl: 3  •  lisinopril (PRINIVIL,ZESTRIL) 10 MG tablet, Take 1 tablet by mouth Daily. For BP, Disp: 90 tablet, Rfl: 3  •  montelukast (SINGULAIR) 10 MG tablet, Take 1 tablet by mouth Daily., Disp: 30 tablet, Rfl: 11  •  mupirocin (Bactroban) 2 % cream, Apply  topically to the appropriate area as directed 2 (Two) Times a Day. Apply to affected area 3 times daily, Disp: 30 g, Rfl: 0  •  ondansetron ODT (ZOFRAN ODT) 8 MG disintegrating tablet, Take 1 tablet by mouth Every 8 (Eight) Hours As Needed for Nausea or Vomiting., Disp: 15 tablet, Rfl: 2  •  oxyCODONE-acetaminophen (PERCOCET)  MG per tablet, Take 1 tablet by mouth Every 4 (Four) Hours As Needed for Moderate Pain ., Disp: 150 tablet, Rfl: 0  •  propranolol (INDERAL) 20 MG tablet, Take 1 tablet by mouth 2 (Two) Times a Day., Disp: 60 tablet, Rfl: 6  •  simvastatin (ZOCOR) 20 MG tablet, Take 1 tablet by mouth Every Night., Disp: 90 tablet, Rfl: 3  •  SITagliptin (Januvia) 100 MG tablet, Take 1 tablet by mouth Daily., Disp: 90 tablet, Rfl: 3  •  vitamin D (ERGOCALCIFEROL) 1.25  "MG (37087 UT) capsule capsule, TAKE 1 CAPSULE BY MOUTH ONE TIME PER WEEK, Disp: 12 capsule, Rfl: 3    Current Facility-Administered Medications:   •  cyanocobalamin injection 1,000 mcg, 1,000 mcg, Intramuscular, Q28 Days, Tasha Jc MD, 1,000 mcg at 08/11/20 1656  •  cyanocobalamin injection 1,000 mcg, 1,000 mcg, Intramuscular, Q28 Days, Aleah Morales MD, 1,000 mcg at 10/14/20 1223    PMH  The following portions of the patient's history were reviewed and updated as appropriate: allergies, current medications, past family history, past medical history, past social history, past surgical history and problem list.    Review of systems  Review of Systems   Constitutional: Positive for appetite change (improved appetite. ), fatigue and unexpected weight change (weight loss). Negative for activity change, chills and diaphoresis.   HENT: Negative for congestion, ear pain, facial swelling, hearing loss, trouble swallowing and voice change.    Eyes: Positive for visual disturbance. Negative for redness and itching.   Cardiovascular: Negative for palpitations and leg swelling.   Gastrointestinal: Negative for abdominal distention, abdominal pain, constipation, nausea and vomiting.   Endocrine:        As listed in HPI   Genitourinary: Negative.    Musculoskeletal: Positive for arthralgias and back pain (radiating to the left leg. ). Negative for joint swelling, myalgias, neck pain and neck stiffness.   Skin: Negative.    Allergic/Immunologic: Negative.    Neurological: Positive for dizziness, weakness, light-headedness, numbness and headaches. Negative for syncope.   Hematological: Negative.    Psychiatric/Behavioral: Positive for decreased concentration (memory loss) and sleep disturbance. The patient is nervous/anxious.    All other systems reviewed and are negative.      Physical exam  Objective   Blood pressure 160/82, pulse 77, temperature 97.8 °F (36.6 °C), height 132.1 cm (52\"), weight 51 kg (112 lb 8 oz), SpO2 " 98 %, not currently breastfeeding.   Physical Exam   Constitutional: She is oriented to person, place, and time. She appears well-developed.   HENT:   Head: Normocephalic and atraumatic.   Eyes: Conjunctivae are normal.   Neck:   The neck is supple and symmetric   Cardiovascular: Normal rate, regular rhythm and normal heart sounds.   Pulmonary/Chest: Effort normal and breath sounds normal.   Musculoskeletal: Deformity present.   Neurological: She is alert and oriented to person, place, and time. She has normal reflexes.   Skin: Skin is warm and dry. No rash noted.   Psychiatric: Thought content normal. Her mood appears anxious. She exhibits a depressed mood.   Vitals reviewed.        LABS AND IMAGING  Office Visit on 10/14/2020   Component Date Value Ref Range Status   • Glucose 10/14/2020 154* 70 - 130 mg/dL Final   • Lot Number 10/14/2020 [id number]   Final   • Expiration Date 10/14/2020 06/02/2021   Final   • Hemoglobin A1C 10/14/2020 7.2  % Final   • Lot Number 10/14/2020 [id number]   Final   • Expiration Date 10/14/2020 05/06/2022   Final   • Glucose 10/14/2020 169* 65 - 99 mg/dL Final   • BUN 10/14/2020 11  8 - 23 mg/dL Final   • Creatinine 10/14/2020 0.80  0.57 - 1.00 mg/dL Final   • eGFR Non  Am 10/14/2020 72  >60 mL/min/1.73 Final   • eGFR African Am 10/14/2020 87  >60 mL/min/1.73 Final   • BUN/Creatinine Ratio 10/14/2020 13.8  7.0 - 25.0 Final   • Sodium 10/14/2020 143  136 - 145 mmol/L Final   • Potassium 10/14/2020 4.7  3.5 - 5.2 mmol/L Final   • Chloride 10/14/2020 104  98 - 107 mmol/L Final   • Total CO2 10/14/2020 32.3* 22.0 - 29.0 mmol/L Final   • Calcium 10/14/2020 9.0  8.6 - 10.5 mg/dL Final   • Total Protein 10/14/2020 6.3  6.0 - 8.5 g/dL Final   • Albumin 10/14/2020 4.30  3.50 - 5.20 g/dL Final   • Globulin 10/14/2020 2.0  gm/dL Final   • A/G Ratio 10/14/2020 2.2  g/dL Final   • Total Bilirubin 10/14/2020 0.7  0.0 - 1.2 mg/dL Final   • Alkaline Phosphatase 10/14/2020 97  39 - 117 U/L Final    • AST (SGOT) 10/14/2020 10  1 - 32 U/L Final   • ALT (SGPT) 10/14/2020 18  1 - 33 U/L Final   • TSH 10/14/2020 1.940  0.270 - 4.200 uIU/mL Final   • Free T4 10/14/2020 1.06  0.93 - 1.70 ng/dL Final    Results may be falsely increased if patient taking Biotin.   • 25 Hydroxy, Vitamin D 10/14/2020 102.0* 30.0 - 100.0 ng/ml Final    Comment: Results may be falsely increased if patient taking Biotin.  Reference Range for Total Vitamin D 25(OH)  Deficiency <20.0 ng/mL  Insufficiency 21-29 ng/mL  Sufficiency  ng/mL  Toxicity >100 ng/ml             Assessment  Assessment/Plan   1. Type 2 diabetes mellitus without complication, without long-term current use of insulin (CMS/[facility])    2. Essential hypertension    3. Mixed hyperlipidemia    4. Controlled type 2 diabetes mellitus without complication, without long-term current use of insulin (CMS/[facility])    5. Postablative hypothyroidism    6. [place]'s disease (CMS/[facility])    7. Skin lesion    8. Vitamin D deficiency    9. B12 deficiency      Orders Placed This Encounter   Procedures   • Comprehensive Metabolic Panel   • TSH   • T4, Free   • Vitamin D 25 Hydroxy   • POC Glucose, Blood   • POC Glycosylated Hemoglobin (Hb A1C)     Plan    -Hydrocortisone dose: 20 mg in am and 5 mg in pm.    -cont fludrocortisone low dose.  I have explained the importance of taking steroids, stress doses discussed.     -BP is elevated- restart lisinopril 10 mg daily, cont propranolol 20 mg BID>     --continue Januvia 100 mg daily, encouraged to continue taking it as the glucose is higher.     -continue Levothyroxine 50 mcg a day. Thyroid function was normal.     -meds refilled and reviewed. I have simplified the regimen.   -Labs today     - Vitamin D level is high 102 and the dose reduced to 50,000 units once a month    I have sent referral to dermatologist for evaluation of skin lesions. She needs resection of the large abdominal lesion with bleeding ulcer on top of it.      Follow-up in 3  months

## 2020-10-15 LAB
25(OH)D3+25(OH)D2 SERPL-MCNC: 102 NG/ML (ref 30–100)
ALBUMIN SERPL-MCNC: 4.3 G/DL (ref 3.5–5.2)
ALBUMIN/GLOB SERPL: 2.2 G/DL
ALP SERPL-CCNC: 97 U/L (ref 39–117)
ALT SERPL-CCNC: 18 U/L (ref 1–33)
AST SERPL-CCNC: 10 U/L (ref 1–32)
BILIRUB SERPL-MCNC: 0.7 MG/DL (ref 0–1.2)
BUN SERPL-MCNC: 11 MG/DL (ref 8–23)
BUN/CREAT SERPL: 13.8 (ref 7–25)
CALCIUM SERPL-MCNC: 9 MG/DL (ref 8.6–10.5)
CHLORIDE SERPL-SCNC: 104 MMOL/L (ref 98–107)
CO2 SERPL-SCNC: 32.3 MMOL/L (ref 22–29)
CREAT SERPL-MCNC: 0.8 MG/DL (ref 0.57–1)
GLOBULIN SER CALC-MCNC: 2 GM/DL
GLUCOSE SERPL-MCNC: 169 MG/DL (ref 65–99)
POTASSIUM SERPL-SCNC: 4.7 MMOL/L (ref 3.5–5.2)
PROT SERPL-MCNC: 6.3 G/DL (ref 6–8.5)
SODIUM SERPL-SCNC: 143 MMOL/L (ref 136–145)
T4 FREE SERPL-MCNC: 1.06 NG/DL (ref 0.93–1.7)
TSH SERPL DL<=0.005 MIU/L-ACNC: 1.94 UIU/ML (ref 0.27–4.2)

## 2020-10-16 ENCOUNTER — TELEPHONE (OUTPATIENT)
Dept: ENDOCRINOLOGY | Facility: CLINIC | Age: 65
End: 2020-10-16

## 2020-10-19 NOTE — TELEPHONE ENCOUNTER
Called and spoke to patient regarding lab results. She verbalized understanding and had no further questions. She is waiting on her insurance company to tell her what dermatologist would work with her insurance.

## 2020-10-23 ENCOUNTER — TELEPHONE (OUTPATIENT)
Dept: FAMILY MEDICINE CLINIC | Facility: CLINIC | Age: 65
End: 2020-10-23

## 2020-11-03 NOTE — TELEPHONE ENCOUNTER
I have tried on several occasions to call Bharti the nurse navigator for her insurance.  Bharti Gonzalezley [phone].  I leave messages and no call back.      I have tried to call the patient as well and get her voice mail that states that message box is full.

## 2020-12-09 ENCOUNTER — TELEPHONE (OUTPATIENT)
Dept: ENDOCRINOLOGY | Facility: CLINIC | Age: 65
End: 2020-12-09

## 2020-12-11 NOTE — TELEPHONE ENCOUNTER
Left detailed message on patient VM asking what surgery and about skin lesion.  Asked patient to return call, can leave detailed with whomever answers phone

## 2020-12-15 NOTE — TELEPHONE ENCOUNTER
"Spoke with patient she stated that she did see a derm otologist, and they want to do surgery, but she could not make it that day and is asking that we schedule it for her.  I advised her that they would need to schedule it, but she stated they told  her to contact our office.  ( I am confused)  \"She is referring to the place on her side.  "

## 2020-12-16 NOTE — TELEPHONE ENCOUNTER
She has a large skin tumor that needs to be removed. Could you forward message to whoever is doing scheduling and referrals. To call the derm office and schedule for her. Not sure why they want us to call, maybe because she missed appointment.

## 2020-12-18 NOTE — TELEPHONE ENCOUNTER
Yes. She had very difficult family situation and her  left her. She had no transportation to make it for the appointment. Her tumor is large and suspicious, I would like to get her in asap. If no dermatology available, maybe general surgeons would be an option? Please keep me updated

## 2020-12-19 DIAGNOSIS — J30.2 SEASONAL ALLERGIC RHINITIS: ICD-10-CM

## 2020-12-21 RX ORDER — MONTELUKAST SODIUM 10 MG/1
TABLET ORAL
Qty: 30 TABLET | Refills: 11 | Status: SHIPPED | OUTPATIENT
Start: 2020-12-21 | End: 2021-09-29

## 2020-12-29 RX ORDER — SYRINGE WITH NEEDLE, 1 ML 25GX5/8"
SYRINGE, EMPTY DISPOSABLE MISCELLANEOUS
Qty: 1 EACH | Refills: 3 | Status: SHIPPED | OUTPATIENT
Start: 2020-12-29 | End: 2021-05-18

## 2021-01-05 ENCOUNTER — OFFICE VISIT (OUTPATIENT)
Dept: ENDOCRINOLOGY | Facility: CLINIC | Age: 66
End: 2021-01-05

## 2021-01-05 VITALS
HEIGHT: 55 IN | DIASTOLIC BLOOD PRESSURE: 80 MMHG | WEIGHT: 110.3 LBS | TEMPERATURE: 97.1 F | OXYGEN SATURATION: 99 % | SYSTOLIC BLOOD PRESSURE: 140 MMHG | BODY MASS INDEX: 25.53 KG/M2 | HEART RATE: 91 BPM

## 2021-01-05 DIAGNOSIS — L98.9 SKIN LESION: ICD-10-CM

## 2021-01-05 DIAGNOSIS — E55.9 VITAMIN D DEFICIENCY: ICD-10-CM

## 2021-01-05 DIAGNOSIS — E53.8 COBALAMIN DEFICIENCY: ICD-10-CM

## 2021-01-05 DIAGNOSIS — E27.1 ADDISON'S DISEASE (HCC): ICD-10-CM

## 2021-01-05 DIAGNOSIS — E89.0 POSTABLATIVE HYPOTHYROIDISM: ICD-10-CM

## 2021-01-05 DIAGNOSIS — E11.9 CONTROLLED TYPE 2 DIABETES MELLITUS WITHOUT COMPLICATION, WITHOUT LONG-TERM CURRENT USE OF INSULIN (HCC): Primary | ICD-10-CM

## 2021-01-05 LAB
EXPIRATION DATE: ABNORMAL
EXPIRATION DATE: NORMAL
GLUCOSE BLDC GLUCOMTR-MCNC: 139 MG/DL (ref 70–130)
HBA1C MFR BLD: 6.3 %
Lab: ABNORMAL
Lab: NORMAL

## 2021-01-05 PROCEDURE — 83036 HEMOGLOBIN GLYCOSYLATED A1C: CPT | Performed by: INTERNAL MEDICINE

## 2021-01-05 PROCEDURE — 99215 OFFICE O/P EST HI 40 MIN: CPT | Performed by: INTERNAL MEDICINE

## 2021-01-05 PROCEDURE — 82947 ASSAY GLUCOSE BLOOD QUANT: CPT | Performed by: INTERNAL MEDICINE

## 2021-01-05 RX ORDER — FLUDROCORTISONE ACETATE 0.1 MG/1
0.05 TABLET ORAL DAILY
Qty: 30 TABLET | Refills: 11 | Status: SHIPPED | OUTPATIENT
Start: 2021-01-05 | End: 2021-06-25 | Stop reason: SDUPTHER

## 2021-01-05 RX ORDER — CYANOCOBALAMIN 1000 UG/ML
1000 INJECTION, SOLUTION INTRAMUSCULAR; SUBCUTANEOUS
Status: DISCONTINUED | OUTPATIENT
Start: 2021-01-05 | End: 2021-12-08

## 2021-01-11 ENCOUNTER — TELEPHONE (OUTPATIENT)
Dept: ENDOCRINOLOGY | Facility: CLINIC | Age: 66
End: 2021-01-11

## 2021-01-11 NOTE — TELEPHONE ENCOUNTER
PT CALLED STATING THAT SHE WOULD LIKE A REFILL OF ALL OF HER MEDICATIONS AND SHE WOULD LIKE TO CHANGE PHARMS.  SHE STATED THAT PART OF HER TOE IS TURNING BLACK. SHE STATED THAT SHE IS HAVING MEMORY PROBLEMS. SHE WAS VERY UPSET AT THE END OF THE CALL B/C I REQUESTED THE NAMES OF THE MEDICATIONS SHE WOULD LIKE REFILLED AND SHE WOULD/COULD NOT TELL ME. SHE REQUESTED A CALL FROM THE MA AND A SOONER APPT FOR US TO LOOK AT HER TOE.

## 2021-01-12 NOTE — TELEPHONE ENCOUNTER
Returned patient call, she stated she forgot to mention her toe when she was at the appt last week. She can hardly walk.  I was able to schedule her with Becky for tomorrow   Will discuss the pharmacy issue at [facility]

## 2021-01-13 ENCOUNTER — OFFICE VISIT (OUTPATIENT)
Dept: ENDOCRINOLOGY | Facility: CLINIC | Age: 66
End: 2021-01-13

## 2021-01-13 VITALS
HEART RATE: 94 BPM | SYSTOLIC BLOOD PRESSURE: 140 MMHG | WEIGHT: 111 LBS | DIASTOLIC BLOOD PRESSURE: 70 MMHG | OXYGEN SATURATION: 99 % | BODY MASS INDEX: 28.86 KG/M2

## 2021-01-13 DIAGNOSIS — E11.9 CONTROLLED TYPE 2 DIABETES MELLITUS WITHOUT COMPLICATION, WITHOUT LONG-TERM CURRENT USE OF INSULIN (HCC): Primary | Chronic | ICD-10-CM

## 2021-01-13 DIAGNOSIS — E89.0 POSTABLATIVE HYPOTHYROIDISM: Chronic | ICD-10-CM

## 2021-01-13 DIAGNOSIS — E55.9 VITAMIN D DEFICIENCY: ICD-10-CM

## 2021-01-13 DIAGNOSIS — E27.1 ADDISON'S DISEASE (HCC): Chronic | ICD-10-CM

## 2021-01-13 DIAGNOSIS — I10 ESSENTIAL HYPERTENSION: Chronic | ICD-10-CM

## 2021-01-13 DIAGNOSIS — L84 PRE-ULCERATIVE CALLUSES: ICD-10-CM

## 2021-01-13 LAB — GLUCOSE BLDC GLUCOMTR-MCNC: 247 MG/DL (ref 70–130)

## 2021-01-13 PROCEDURE — 99214 OFFICE O/P EST MOD 30 MIN: CPT | Performed by: PHYSICIAN ASSISTANT

## 2021-01-13 PROCEDURE — 82947 ASSAY GLUCOSE BLOOD QUANT: CPT | Performed by: PHYSICIAN ASSISTANT

## 2021-01-13 RX ORDER — LANCETS 30 GAUGE
EACH MISCELLANEOUS
Qty: 300 EACH | Refills: 3 | Status: SHIPPED | OUTPATIENT
Start: 2021-01-13 | End: 2022-08-22 | Stop reason: SDUPTHER

## 2021-01-13 RX ORDER — BLOOD-GLUCOSE METER
KIT MISCELLANEOUS
Qty: 1 EACH | Refills: 0 | Status: SHIPPED | OUTPATIENT
Start: 2021-01-13 | End: 2022-08-22 | Stop reason: SDUPTHER

## 2021-01-13 NOTE — PROGRESS NOTES
"Chief complaint  Follow-up (DM 2 foot issues )    Subjective   Karen Rubio is a 65 y.o. female is here today for follow-up.  Follow-up for hypothyroidism and [place]'s disease. HTN and Depression. Diabetes.   Adrenal insufficiency / [place]'s disease dx in 2004. She had significant hyperpigmentation and fatigue. She was taking hydrocortisone and fludrocortisone.                                                                                                                      Diabetes mellitus type 2   On Januvia 100  mg daily.     Hypothyroidism postablative. S/p I-131 therapy at age 14. She is taking levothyroxine 50 mcg.       She comes in today for a black place on her toe. Started last week. It's a callus on medial aspect of second toe on left foot with black underneath it. That toe is always slightly red and that has not changed. It is not hot to the touch. It does hurt as that toe rubs against the great toe.    today.   Dr. Morales referred her to surgery for lesion on abdomen. This appt is scheduled for 2/11.      Medications    Current Outpatient Medications:   •  albuterol sulfate  (90 Base) MCG/ACT inhaler, Inhale 2 puffs Every 4 (Four) Hours As Needed for Wheezing., Disp: 3.7 g, Rfl: 11  •  ALPRAZolam (XANAX) 1 MG tablet, Take 1 tablet by mouth 4 (Four) Times a Day., Disp: 120 tablet, Rfl: 5  •  B-D 3CC LUER-JELLY SYR 25GX1\" 25G X 1\" 3 ML misc, USE AS DIRECTED FOR B12 INJECTIONS, Disp: 1 each, Rfl: 3  •  carboxymethylcellulose (Lubricant Eye Drops) 0.5 % solution, Administer 1 drop to both eyes 3 (Three) Times a Day As Needed for Dry Eyes., Disp: 30 mL, Rfl: 11  •  cetirizine (zyrTEC) 10 MG tablet, TAKE 1 TABLET BY MOUTH EVERY DAY, Disp: 30 tablet, Rfl: 9  •  cyanocobalamin 1000 MCG/ML injection, INJECT 1 ML INTRAMUSCUARLY ONCE EVERY 2 WEEKS AS DIRECTED, Disp: 2 mL, Rfl: 3  •  diclofenac (VOLTAREN) 75 MG EC tablet, , Disp: , Rfl:   •  fludrocortisone 0.1 MG tablet, Take 0.5 tablets by " mouth Daily., Disp: 30 tablet, Rfl: 11  •  fluticasone (FLONASE) 50 MCG/ACT nasal spray, 1 spray into the nostril(s) as directed by provider Daily., Disp: 1 bottle, Rfl: 3  •  hydrocortisone (CORTEF) 5 MG tablet, TAKE 4 TABLETS BY MOUTH EVERY DAY IN THE MORNING AND ALSO TAKE 1 TABLET EVERY DAY AT 3 PM (TAKE DOUBLE DOSE IN THE EVENT OF ILLNESS), Disp: 200 tablet, Rfl: 3  •  lactulose (CHRONULAC) 10 GM/15ML solution, , Disp: , Rfl:   •  levothyroxine (SYNTHROID, LEVOTHROID) 50 MCG tablet, Take 1 tablet by mouth Daily., Disp: 90 tablet, Rfl: 3  •  lisinopril (PRINIVIL,ZESTRIL) 10 MG tablet, Take 1 tablet by mouth Daily. For BP, Disp: 90 tablet, Rfl: 3  •  montelukast (SINGULAIR) 10 MG tablet, TAKE 1 TABLET BY MOUTH EVERY DAY, Disp: 30 tablet, Rfl: 11  •  mupirocin (Bactroban) 2 % cream, Apply  topically to the appropriate area as directed 2 (Two) Times a Day. Apply to affected area 3 times daily, Disp: 30 g, Rfl: 0  •  ondansetron ODT (ZOFRAN ODT) 8 MG disintegrating tablet, Take 1 tablet by mouth Every 8 (Eight) Hours As Needed for Nausea or Vomiting., Disp: 15 tablet, Rfl: 2  •  oxyCODONE-acetaminophen (PERCOCET)  MG per tablet, Take 1 tablet by mouth Every 4 (Four) Hours As Needed for Moderate Pain ., Disp: 150 tablet, Rfl: 0  •  simvastatin (ZOCOR) 20 MG tablet, Take 1 tablet by mouth Every Night., Disp: 90 tablet, Rfl: 3  •  SITagliptin (Januvia) 100 MG tablet, Take 1 tablet by mouth Daily., Disp: 90 tablet, Rfl: 3  •  vitamin D (ERGOCALCIFEROL) 1.25 MG (08053 UT) capsule capsule, TAKE 1 CAPSULE BY MOUTH ONE TIME PER WEEK, Disp: 12 capsule, Rfl: 3  •  glucose blood test strip, Test BG TID, Disp: 300 each, Rfl: 3  •  glucose monitor monitoring kit, Test BG TID, Disp: 1 each, Rfl: 0  •  Lancets misc, Test BG TID, Disp: 300 each, Rfl: 3    Current Facility-Administered Medications:   •  cyanocobalamin injection 1,000 mcg, 1,000 mcg, Intramuscular, Q28 Days, Aleah Morales MD    [facility]  The following portions of  the patient's history were reviewed and updated as appropriate: allergies, current medications, past family history, past medical history, past social history, past surgical history and problem list.    Review of systems  Review of Systems   Constitutional: Positive for appetite change (improved appetite. ), fatigue and unexpected weight change (weight loss). Negative for activity change, chills and diaphoresis.   HENT: Negative for congestion, ear pain, facial swelling, hearing loss, trouble swallowing and voice change.    Eyes: Positive for visual disturbance. Negative for redness and itching.   Cardiovascular: Negative for palpitations and leg swelling.   Gastrointestinal: Negative for abdominal distention, abdominal pain, constipation, nausea and vomiting.   Endocrine:        As listed in HPI   Genitourinary: Negative.    Musculoskeletal: Positive for arthralgias and back pain (radiating to the left leg (chronic)). Negative for joint swelling, myalgias, neck pain and neck stiffness.   Skin: Negative.    Allergic/Immunologic: Negative.    Neurological: Positive for dizziness, weakness, light-headedness, numbness and headaches. Negative for syncope.   Hematological: Negative.    Psychiatric/Behavioral: Positive for decreased concentration (memory loss) and sleep disturbance. The patient is nervous/anxious.    All other systems reviewed and are negative.      Physical exam  Objective   Blood pressure 140/70, pulse 94, weight 50.3 kg (111 lb), SpO2 99 %, not currently breastfeeding.   Physical Exam   Constitutional: She is oriented to person, place, and time. She appears well-developed.   HENT:   Head: Normocephalic and atraumatic.   Eyes: Conjunctivae are normal.   Neck:   The neck is supple and symmetric   Cardiovascular: Normal rate, regular rhythm and normal heart sounds.   Pulmonary/Chest: Effort normal and breath sounds normal.   Musculoskeletal: Deformity present.     Skin Integrity  - She has left foot ulcer  (pre-ulcerative callus medial aspect second toe)..  Neurological: She is alert and oriented to person, place, and time. She has normal reflexes.   Skin: Skin is warm and dry. No rash noted.   Psychiatric: Thought content normal. Her mood appears anxious. She exhibits a depressed mood.   Vitals reviewed.        LABS AND IMAGING  Office Visit on 01/13/2021   Component Date Value Ref Range Status   • Glucose 01/13/2021 247* 70 - 130 mg/dL Final   Office Visit on 01/05/2021   Component Date Value Ref Range Status   • Glucose 01/05/2021 139* 70 - 130 mg/dL Final   • Lot Number 01/05/2021 [id number]   Final   • Expiration Date 01/05/2021 07/01/2021   Final   • Hemoglobin A1C 01/05/2021 6.3  % Final   • Lot Number 01/05/2021 [id number]   Final   • Expiration Date 01/05/2021 09/29/2022   Final       Labs reviewed:  12/8/2020TSH 1.08, FT4 0.94     Assessment  Assessment/Plan   1. Controlled type 2 diabetes mellitus without complication, without long-term current use of insulin (CMS/[facility])    2. Pre-ulcerative calluses    3. Vitamin D deficiency    4. Postablative hypothyroidism    5. Essential hypertension    6. [place]'s disease (CMS/[facility])      Orders Placed This Encounter   Procedures   • Sedimentation Rate   • Vitamin D 25 Hydroxy   • Ambulatory Referral to Podiatry   • POC Glucose, Blood   • CBC & Differential     Plan    -Hydrocortisone dose: 20 mg in am and 5 mg in pm.    -cont 1/2 tab fludrocortisone    -HTN - lisinopril 10 mg daily.     --continue Januvia 100 mg daily, encouraged to continue taking it as the glucose is higher.          -continue Levothyroxine 50 mcg a day. Thyroid function was normal 12/2020    - check vitamin d     - pre-ulcerative callus medial second toe of left foot, we made an appt with podiatry on 1/19 at 10:30. Check cbc, sed rate but does not appear infected on exam    She has a f/u with Dr. Morales next month

## 2021-01-14 LAB
25(OH)D3+25(OH)D2 SERPL-MCNC: 42.4 NG/ML (ref 30–100)
BASOPHILS # BLD AUTO: 0.1 X10E3/UL (ref 0–0.2)
BASOPHILS NFR BLD AUTO: 1 %
EOSINOPHIL # BLD AUTO: 0.1 X10E3/UL (ref 0–0.4)
EOSINOPHIL NFR BLD AUTO: 1 %
ERYTHROCYTE [DISTWIDTH] IN BLOOD BY AUTOMATED COUNT: 12.3 % (ref 11.7–15.4)
ERYTHROCYTE [SEDIMENTATION RATE] IN BLOOD BY WESTERGREN METHOD: 3 MM/HR (ref 0–40)
HCT VFR BLD AUTO: 39.4 % (ref 34–46.6)
HGB BLD-MCNC: 13 G/DL (ref 11.1–15.9)
IMM GRANULOCYTES # BLD AUTO: 0 X10E3/UL (ref 0–0.1)
IMM GRANULOCYTES NFR BLD AUTO: 0 %
LYMPHOCYTES # BLD AUTO: 1.1 X10E3/UL (ref 0.7–3.1)
LYMPHOCYTES NFR BLD AUTO: 12 %
MCH RBC QN AUTO: 28.4 PG (ref 26.6–33)
MCHC RBC AUTO-ENTMCNC: 33 G/DL (ref 31.5–35.7)
MCV RBC AUTO: 86 FL (ref 79–97)
MONOCYTES # BLD AUTO: 0.5 X10E3/UL (ref 0.1–0.9)
MONOCYTES NFR BLD AUTO: 5 %
NEUTROPHILS # BLD AUTO: 7.6 X10E3/UL (ref 1.4–7)
NEUTROPHILS NFR BLD AUTO: 81 %
PLATELET # BLD AUTO: 232 X10E3/UL (ref 150–450)
RBC # BLD AUTO: 4.58 X10E6/UL (ref 3.77–5.28)
WBC # BLD AUTO: 9.4 X10E3/UL (ref 3.4–10.8)

## 2021-01-27 ENCOUNTER — DOCUMENTATION (OUTPATIENT)
Dept: FAMILY MEDICINE CLINIC | Facility: CLINIC | Age: 66
End: 2021-01-27

## 2021-01-27 DIAGNOSIS — J30.2 SEASONAL ALLERGIC RHINITIS: ICD-10-CM

## 2021-01-27 DIAGNOSIS — J45.20 MILD INTERMITTENT ASTHMA WITHOUT COMPLICATION: ICD-10-CM

## 2021-01-27 RX ORDER — ALBUTEROL SULFATE 90 UG/1
2 AEROSOL, METERED RESPIRATORY (INHALATION) EVERY 4 HOURS PRN
Qty: 1 G | Refills: 1 | Status: SHIPPED | OUTPATIENT
Start: 2021-01-27 | End: 2022-09-23 | Stop reason: SDUPTHER

## 2021-01-27 RX ORDER — ALBUTEROL SULFATE 90 UG/1
2 AEROSOL, METERED RESPIRATORY (INHALATION) EVERY 4 HOURS PRN
Qty: 1 G | Refills: 1 | Status: SHIPPED | OUTPATIENT
Start: 2021-01-27 | End: 2021-01-27 | Stop reason: SDUPTHER

## 2021-02-01 RX ORDER — ALBUTEROL SULFATE 90 UG/1
AEROSOL, METERED RESPIRATORY (INHALATION)
Qty: 18 G | Refills: 0 | OUTPATIENT
Start: 2021-02-01

## 2021-02-09 NOTE — TELEPHONE ENCOUNTER
Annita call to ask her 90 day supplies of medication.  I let her know that all her RX are for 90 days   She also stated she needs a rx for vit d

## 2021-02-11 RX ORDER — CYANOCOBALAMIN 1000 UG/ML
INJECTION, SOLUTION INTRAMUSCULAR; SUBCUTANEOUS
Qty: 2 ML | Refills: 3 | Status: SHIPPED | OUTPATIENT
Start: 2021-02-11 | End: 2021-04-27

## 2021-02-24 ENCOUNTER — TELEPHONE (OUTPATIENT)
Dept: FAMILY MEDICINE CLINIC | Facility: CLINIC | Age: 66
End: 2021-02-24

## 2021-02-24 NOTE — TELEPHONE ENCOUNTER
This is not a new complaint.  She has been reached out to numerous times and referred to numerous places but does not show up when help has been arranged.  Kaitlyn is more than welcome to try to help.

## 2021-02-24 NOTE — TELEPHONE ENCOUNTER
"RECEIVED CALL FROM PAULINO AT [facility] REACHED OUT ON A 3-WAY CALL WITH PATIENT REGARDING PATIENT REQUESTING HOME HEALTH SERVICES/ASSISTANCE.    PATIENT WAS CRYING, STRESSED, STATING THAT \"NO ONE IS HEARING HER.\"  PATIENT IS EXPRESSING MUCH SADNESS, DISTRESS, STATING SHE  CANNOT GET ANY HELP.  PATIENT STATED FEELING OF HELPLESSNESS.      PATIENT HAS BEEN UNABLE TO GET HELP WITH TRANSPORTATION TO AND FROM APPOINTMENTS, ERRANDS, MEALS, AND MEAL PREPARATION.      [facility]. CONTACT: JUWAN    PHONE:  [phone]  EXT. [phone]            PATIENT PHONE NUMBER    PLEASE ADVISE:  [phone]  "

## 2021-03-08 ENCOUNTER — TELEPHONE (OUTPATIENT)
Dept: FAMILY MEDICINE CLINIC | Facility: CLINIC | Age: 66
End: 2021-03-08

## 2021-03-08 DIAGNOSIS — E55.9 VITAMIN D DEFICIENCY: ICD-10-CM

## 2021-03-08 NOTE — TELEPHONE ENCOUNTER
PATIENT IS DESPERATELY TRYING TO BE SQUEEZED IN FOR AN APPOINTMENT WITH DR. SMITH BEFORE Wednesday 3/10/21 DUE TO TESTING POSSIBLE FOR SKIN CANCER.     PATIENT STATES SHE NEEDS TO BE SEEN BEFORE HER APPOINTMENT BACK WITH THE CANCER DOCTOR.    PLEASE ADVISE    CALL BACK NUMBER [phone]

## 2021-03-11 RX ORDER — ERGOCALCIFEROL 1.25 MG/1
CAPSULE ORAL
Qty: 12 CAPSULE | Refills: 3 | Status: SHIPPED | OUTPATIENT
Start: 2021-03-11 | End: 2021-06-25 | Stop reason: SDUPTHER

## 2021-03-19 ENCOUNTER — TELEPHONE (OUTPATIENT)
Dept: FAMILY MEDICINE CLINIC | Facility: CLINIC | Age: 66
End: 2021-03-19

## 2021-03-19 ENCOUNTER — OFFICE VISIT (OUTPATIENT)
Dept: FAMILY MEDICINE CLINIC | Facility: CLINIC | Age: 66
End: 2021-03-19

## 2021-03-19 VITALS
RESPIRATION RATE: 18 BRPM | DIASTOLIC BLOOD PRESSURE: 92 MMHG | HEART RATE: 100 BPM | OXYGEN SATURATION: 98 % | BODY MASS INDEX: 26.38 KG/M2 | SYSTOLIC BLOOD PRESSURE: 148 MMHG | WEIGHT: 114 LBS | HEIGHT: 55 IN

## 2021-03-19 DIAGNOSIS — M40.204 KYPHOSIS OF THORACIC REGION, UNSPECIFIED KYPHOSIS TYPE: Primary | ICD-10-CM

## 2021-03-19 DIAGNOSIS — F41.1 GAD (GENERALIZED ANXIETY DISORDER): ICD-10-CM

## 2021-03-19 DIAGNOSIS — I10 ESSENTIAL HYPERTENSION: ICD-10-CM

## 2021-03-19 DIAGNOSIS — C43.59 MALIGNANT MELANOMA OF TORSO EXCLUDING BREAST (HCC): ICD-10-CM

## 2021-03-19 PROCEDURE — 99214 OFFICE O/P EST MOD 30 MIN: CPT | Performed by: FAMILY MEDICINE

## 2021-03-19 NOTE — PROGRESS NOTES
"Subjective   Karen Rubio is a 65 y.o. female    Chief Complaint    Anxiety    History of Present Illness  The patient has a history of anxiety and is currently on alprazolam 1 mg four times daily. She has tried numerous SSRI and SNRI which have not been helpful. The patient has also been referred for psychological counseling but has not kept many of these appointments.     She describes having deterioration of the spine with severe pain and difficulty walking at times. The patient fell on some stairs which has caused leg pain and worsened her back pain. She also relates having accidentally dropped the bottle of her pain medicine in the commode and asked for a refill.    The patient describes seeing Dr. Trina Hurley who removed a \"bunion\" from her toe. She reports previous melanoma right thorax.  Work-up ongoing.    The patient relates her  has left her, taking the car, and she is having transportation and financial difficulty.    The following portions of the patient's history were reviewed and updated as appropriate: allergies, current medications, past social history and problem list    Review of Systems   Constitutional: Negative.  Negative for appetite change and fatigue.   Respiratory: Negative.  Negative for chest tightness and shortness of breath.    Gastrointestinal: Negative.  Negative for abdominal pain, diarrhea and nausea.   Musculoskeletal: Positive for back pain. Negative for arthralgias, gait problem and myalgias.   Neurological: Negative for dizziness, tremors, weakness, light-headedness, numbness and headaches.   Psychiatric/Behavioral: Positive for dysphoric mood and sleep disturbance. Negative for agitation, behavioral problems, confusion, decreased concentration and suicidal ideas. The patient is nervous/anxious.        Objective     Vitals:    03/19/21 0939   BP: 148/92   Pulse: 100   Resp: 18   SpO2: 98%       Physical Exam  Vitals and nursing note reviewed.   Constitutional:  "      Appearance: She is well-developed.   Cardiovascular:      Rate and Rhythm: Normal rate and regular rhythm.   Pulmonary:      Effort: Pulmonary effort is normal.      Breath sounds: Normal breath sounds.   Abdominal:      General: Bowel sounds are normal.      Palpations: Abdomen is soft.   Musculoskeletal:      Lumbar back: Tenderness and bony tenderness present. No swelling, deformity or spasms. Decreased range of motion.   Neurological:      Mental Status: She is alert and oriented to person, place, and time.      Deep Tendon Reflexes: Reflexes are normal and symmetric.         Assessment/Plan   Problems Addressed this Visit        Cardiac and Vasculature    Essential hypertension (Chronic)       Mental Health    BAILEY (generalized anxiety disorder)      Other Visit Diagnoses     Kyphosis of thoracic region, unspecified kyphosis type    -  Primary    Relevant Orders    Ambulatory Referral to Orthopedic Surgery (Completed)    Malignant melanoma of torso excluding breast (CMS/HCC)          Diagnoses       Codes Comments    Kyphosis of thoracic region, unspecified kyphosis type    -  Primary ICD-10-CM: M40.204  ICD-9-CM: 737.10     Essential hypertension     ICD-10-CM: I10  ICD-9-CM: 401.9     Malignant melanoma of torso excluding breast (CMS/HCC)     ICD-10-CM: C43.59  ICD-9-CM: 172.5     BAILEY (generalized anxiety disorder)     ICD-10-CM: F41.1  ICD-9-CM: 300.02               Scribed for COLLINS Hernandez MD by Gabrielle Posada.  03/19/21   13:16 EDT    I have personally performed the services described in this document as scribed by the above individual, and it is both accurate and complete.  COLLINS Hernandez MD  3/20/2021  14:22 EDT

## 2021-03-19 NOTE — TELEPHONE ENCOUNTER
PT. JUST SAW DR. WILKINS TODAY/AM.  PT. STATED HER BP IS ELEVATED, & WONDERING WHAT DR. WILKINS IS WANTING HER OFF OF?  PT. ALSO WONDERING IF DR. WILKINS IS NEEDING A F/U APPT.     PT. IS WANTING A CALL BACK TO CELL PHONE #: [phone].

## 2021-03-19 NOTE — TELEPHONE ENCOUNTER
Suspect her blood pressure was up slightly due to her emotional state and also to her back pain.  Continue to monitor but no changes to make at this time.

## 2021-04-28 ENCOUNTER — TELEPHONE (OUTPATIENT)
Dept: FAMILY MEDICINE CLINIC | Facility: CLINIC | Age: 66
End: 2021-04-28

## 2021-04-28 DIAGNOSIS — F41.9 ANXIETY: ICD-10-CM

## 2021-04-28 RX ORDER — ALPRAZOLAM 1 MG/1
1 TABLET ORAL 4 TIMES DAILY
Qty: 120 TABLET | Refills: 0 | Status: SHIPPED | OUTPATIENT
Start: 2021-04-28 | End: 2021-05-28 | Stop reason: SDUPTHER

## 2021-04-28 RX ORDER — CYANOCOBALAMIN 1000 UG/ML
INJECTION, SOLUTION INTRAMUSCULAR; SUBCUTANEOUS
Qty: 2 ML | Refills: 3 | Status: SHIPPED | OUTPATIENT
Start: 2021-04-28 | End: 2021-09-08

## 2021-04-28 NOTE — TELEPHONE ENCOUNTER
She is already on Xanax 4 times a day, has her prescription ?  If so I can send in enough for 1 month and she can make an appointment.  She can increase her lisinopril and take 2 tablets a day instead of 1.

## 2021-04-28 NOTE — TELEPHONE ENCOUNTER
Patient called stating she needs to be seen, informed her no availability for this week, patient broke down crying states that lisinopril is not helping and she needs Xanax, please advise if she can be worked in.

## 2021-04-30 ENCOUNTER — TELEPHONE (OUTPATIENT)
Dept: FAMILY MEDICINE CLINIC | Facility: CLINIC | Age: 66
End: 2021-04-30

## 2021-04-30 ENCOUNTER — NURSE TRIAGE (OUTPATIENT)
Dept: CALL CENTER | Facility: HOSPITAL | Age: 66
End: 2021-04-30

## 2021-04-30 NOTE — TELEPHONE ENCOUNTER
Caller: Karen Rubio    Relationship: Self    Best call back number: [phone]    What is the best time to reach you: ANYTIME    Who are you requesting to speak with (clinical staff, provider,  specific staff member): CLINICAL        What was the call regarding: PATIENT STATED HER  HAS TAKEN HER CAR AND SHE HAS NO TRANSPORTATION. SHE WOULD LIKE TO KNOW IF THERE ARE TRANSPORTATION SERVICES TO HELP HER GET TO APPOINTMENTS.    Do you require a callback: YES

## 2021-04-30 NOTE — TELEPHONE ENCOUNTER
"Call from [facility]. Caller is very up set and crying.  has walked out and her mattress is on the floor. He  took her car. Appt today with MD but will not be able to go, has no transportation.   Dx with Melanoma about one month ago. Caller has been under a lot of life changes and stress. She was calling to cancel her appt but offered televist. She is agreeable. Warm transfer to MD office to set up telehealth visit at appt time.    Reason for Disposition  • Patient sounds very upset or troubled to the triager    Additional Information  • Negative: Severe difficulty breathing (e.g., struggling for each breath, speaks in single words)  • Negative: Bluish (or gray) lips or face now  • Negative: Difficult to awaken or acting confused (e.g., disoriented, slurred speech)  • Negative: Hysterical or combative behavior  • Negative: Sounds like a life-threatening emergency to the triager  • Negative: Chest pain  • Negative: Palpitations, skipped heart beat, or rapid heart beat  • Negative: Cough is main symptom  • Negative: Suicide thoughts, threats, attempts, or questions  • Negative: Depression is main problem or symptom (e.g., feelings of sadness or hopelessness)  • Negative: [1] Difficulty breathing AND [2] persists > 10 minutes AND [3] not relieved by reassurance provided by triager  • Negative: [1] Lightheadedness or dizziness AND [2] persists > 10 minutes AND [3] not relieved by reassurance provided by triager  • Negative: [1] Alcohol or drug abuse, known or suspected AND [2] feeling very shaky (i.e., visible tremors of hands)  • Negative: Patient sounds very sick or weak to the triager  • Negative: Symptoms interfere with work or school  • Negative: Requesting to talk to a counselor (e.g., mental health worker, psychiatrist)    Answer Assessment - Initial Assessment Questions  1. CONCERN: \"What happened that made you call today?\"      Wanting to cancel appt   2. ANXIETY SYMPTOM SCREENING: \"Can you describe how " "you have been feeling?\"  (e.g., tense, restless, panicky, anxious, keyed up, trouble sleeping, trouble concentrating)    Over a andrey. Was dx with Melonoma and  walked out on her  3. ONSET: \"How long have you been feeling this way?\"      *No Answer*  4. RECURRENT: \"Have you felt this way before?\"  If yes: \"What happened that time?\" \"What helped these feelings go away in the past?\"      Takes medication  5. RISK OF HARM - SUICIDAL IDEATION:  \"Do you ever have thoughts of hurting or killing yourself?\"  (e.g., yes, no, no but preoccupation with thoughts about death)    - INTENT:  \"Do you have thoughts of hurting or killing yourself right NOW?\" (e.g., yes, no, N/A)    - PLAN: \"Do you have a specific plan for how you would do this?\" (e.g., gun, knife, overdose, no plan, N/A)  denies  6. RISK OF HARM - HOMICIDAL IDEATION:  \"Do you ever have thoughts of hurting or killing someone else?\"  (e.g., yes, no, no but preoccupation with thoughts about death)    - INTENT:  \"Do you have thoughts of hurting or killing someone right NOW?\" (e.g., yes, no, N/A)    - PLAN: \"Do you have a specific plan for how you would do this?\" (e.g., gun, knife, no plan, N/A)    denies  7. FUNCTIONAL IMPAIRMENT: \"How have things been going for you overall in your life? Have you had any more difficulties than usual doing your normal daily activities?\"  (e.g., better, same, worse; self-care, school, work, interactions)      *No Answer*  8. SUPPORT: \"Who is with you now?\" \"Who do you live with?\" \"Do you have family or friends nearby who you can talk to?\"       *No Answer*  9. THERAPIST: \"Do you have a counselor or therapist? Name?\"      *No Answer*  10. STRESSORS: \"Has there been any new stress or recent changes in your life?\"   walked out on her  11. CAFFEINE ABUSE: \"Do you drink caffeinated beverages, and how much each day?\" (e.g., coffee, tea, ana)        *No Answer*  12. SUBSTANCE ABUSE: \"Do you use any illegal drugs or alcohol?\"        " "*No Answer*  13. OTHER SYMPTOMS: \"Do you have any other physical symptoms right now?\" (e.g., chest pain, palpitations, difficulty breathing, fever)        *No Answer*  14. PREGNANCY: \"Is there any chance you are pregnant?\" \"When was your last menstrual period?\"  na    Protocols used: ANXIETY AND PANIC ATTACK-ADULT-AH      "

## 2021-04-30 NOTE — TELEPHONE ENCOUNTER
Patient canceled her appt today due to no transportation but now she wants to do a video call instead, can she be worked back into the schedule, she has android and can use google duo. Please advise.

## 2021-05-04 NOTE — TELEPHONE ENCOUNTER
Pt arrives from home with C/O SI.  Pt has a plan to harm herself by taking a \"bottle of ibuprofen\".  Pt denies taking any drugs, pills or alcohol today.  Pt stated that she got upset after seeing her  today.  + some non traumatic neck pain that started when she woke today.  Pt is voluntary at this time and A&O x 4   Pt's  called back asking about the MRI   Pt's  also asked about a letter he needs about living on a 1 level dwelling    Please advise and give pt's  number [phone]

## 2021-05-11 ENCOUNTER — TELEPHONE (OUTPATIENT)
Dept: ENDOCRINOLOGY | Facility: CLINIC | Age: 66
End: 2021-05-11

## 2021-05-18 RX ORDER — SYRINGE WITH NEEDLE, 1 ML 25GX5/8"
SYRINGE, EMPTY DISPOSABLE MISCELLANEOUS
Qty: 4 EACH | Refills: 3 | Status: SHIPPED | OUTPATIENT
Start: 2021-05-18

## 2021-05-19 ENCOUNTER — TELEPHONE (OUTPATIENT)
Dept: FAMILY MEDICINE CLINIC | Facility: CLINIC | Age: 66
End: 2021-05-19

## 2021-05-19 DIAGNOSIS — F32.9 MAJOR DEPRESSIVE DISORDER WITH CURRENT ACTIVE EPISODE, UNSPECIFIED DEPRESSION EPISODE SEVERITY, UNSPECIFIED WHETHER RECURRENT: ICD-10-CM

## 2021-05-19 DIAGNOSIS — F44.89 CONFUSION STATE: ICD-10-CM

## 2021-05-19 DIAGNOSIS — F41.9 ANXIETY: Primary | ICD-10-CM

## 2021-05-19 NOTE — TELEPHONE ENCOUNTER
Patient's daughter called with concerns about her mother she would like to speak to Dr. Kelly about. Please call back when possible. Thanks!

## 2021-05-19 NOTE — TELEPHONE ENCOUNTER
"Called Tania (on verbal release). She's concerned about Karen's mental health, she says in recent months her mother is confused, cries all the time, has been found wondering the streets before. She's concerned about her meds, says one time she was found wandering and had 2 full bottles of Xanax on her. I told her Karen has been depressed and crying in the last few office visits, and a few times she either cancels or no-shows. I told her I'm pretty sure we've recc psych to her before. I told her she needs to take her mother to a psych hospital to get evaluated and possibly held, she verbalized an understanding of that. I told her Karen has an appt scheduled 05/27 and that perhaps she should come with her and bring all her meds and we could advise. She says she's going to try to come with her, but it's hard because they;re relationship is strained due to Karen's \"life decisions\". She wants to know if you can go ahead and start the process of doing a psych referral? When referral is placed please call daughter Tania as a contact. Tania Abraham [phone].  "

## 2021-05-28 DIAGNOSIS — F41.9 ANXIETY: ICD-10-CM

## 2021-05-28 RX ORDER — ALPRAZOLAM 1 MG/1
1 TABLET ORAL 4 TIMES DAILY
Qty: 76 TABLET | Refills: 0 | Status: SHIPPED | OUTPATIENT
Start: 2021-05-28 | End: 2021-06-25 | Stop reason: SDUPTHER

## 2021-05-28 NOTE — TELEPHONE ENCOUNTER
Patient's medication has been pended for 19 days (76 tablets) which should be enough to get her to her appointment. Please advise if this is okay or sign off. Thank you!

## 2021-05-28 NOTE — TELEPHONE ENCOUNTER
Patient missed her appt yesterday states she thought it was today, and that her ride from [place] never show up to pick her up, states she needs a refill on her xanax and rs her appt for telehealth for next available 6/16. Please advise.

## 2021-06-10 ENCOUNTER — TELEPHONE (OUTPATIENT)
Dept: ENDOCRINOLOGY | Facility: CLINIC | Age: 66
End: 2021-06-10

## 2021-06-10 NOTE — TELEPHONE ENCOUNTER
Spoke with  patient advised that that medication was NOT on her med list. She stated she had a bottle with Dr Morales name on it. I contacted the pharmacy they stated that they received an E- Script on 10/14/2020. I advised them NOT to fill it until I spoke with Dr Morales. About this     Patient was also upset about the fear of being discharged from the Practice due to NOT showing for her appointments.  I explained that when she misses an appt or cancels at the last minute that it prevents someone else from coming and that Dr Morales cancellation list was over 100 patient waiting for appointments. Patient stated that she would do better, she just has a lot of personal things going on in her life right now.       Patient will not take the medication as she has only taken a couple because she did not know if she to take it or not.  Please advise on this matter if patient is to be on Propranolol 20mg

## 2021-06-10 NOTE — TELEPHONE ENCOUNTER
Did you happen to speak to her about scheduling an appt.  Also this medication is not on her med list, and we cannot send to alternate provider to fill without being seen. Can we see if eBck[facility] has anything on her schedule?

## 2021-06-14 NOTE — TELEPHONE ENCOUNTER
Mailbox full. Unable to lvm for patient to contact our office to schedule on 6/22 with Dr. Morales.

## 2021-06-14 NOTE — TELEPHONE ENCOUNTER
We can plan on scheduling her next week: Tuesday morning Sharri 22 (I would like to open this day and schedule patients from cancellation list). Maybe she could be a last patient of the day (1 pm)

## 2021-06-25 ENCOUNTER — OFFICE VISIT (OUTPATIENT)
Dept: ENDOCRINOLOGY | Facility: CLINIC | Age: 66
End: 2021-06-25

## 2021-06-25 ENCOUNTER — LAB (OUTPATIENT)
Dept: LAB | Facility: HOSPITAL | Age: 66
End: 2021-06-25

## 2021-06-25 VITALS
HEART RATE: 78 BPM | DIASTOLIC BLOOD PRESSURE: 86 MMHG | WEIGHT: 116 LBS | HEIGHT: 55 IN | OXYGEN SATURATION: 98 % | BODY MASS INDEX: 26.85 KG/M2 | SYSTOLIC BLOOD PRESSURE: 144 MMHG

## 2021-06-25 DIAGNOSIS — E89.0 POSTABLATIVE HYPOTHYROIDISM: Chronic | ICD-10-CM

## 2021-06-25 DIAGNOSIS — E55.9 VITAMIN D DEFICIENCY: ICD-10-CM

## 2021-06-25 DIAGNOSIS — I10 ESSENTIAL HYPERTENSION: Chronic | ICD-10-CM

## 2021-06-25 DIAGNOSIS — E27.1 ADDISON'S DISEASE (HCC): Chronic | ICD-10-CM

## 2021-06-25 DIAGNOSIS — E11.9 CONTROLLED TYPE 2 DIABETES MELLITUS WITHOUT COMPLICATION, WITHOUT LONG-TERM CURRENT USE OF INSULIN (HCC): Primary | ICD-10-CM

## 2021-06-25 DIAGNOSIS — F41.9 ANXIETY: ICD-10-CM

## 2021-06-25 DIAGNOSIS — E11.9 TYPE 2 DIABETES MELLITUS WITHOUT COMPLICATION, WITHOUT LONG-TERM CURRENT USE OF INSULIN (HCC): ICD-10-CM

## 2021-06-25 LAB
25(OH)D3 SERPL-MCNC: 63.4 NG/ML
ANION GAP SERPL CALCULATED.3IONS-SCNC: 8.6 MMOL/L (ref 5–15)
BUN SERPL-MCNC: 16 MG/DL (ref 8–23)
BUN/CREAT SERPL: 21.3 (ref 7–25)
CALCIUM SPEC-SCNC: 9.2 MG/DL (ref 8.6–10.5)
CHLORIDE SERPL-SCNC: 101 MMOL/L (ref 98–107)
CO2 SERPL-SCNC: 27.4 MMOL/L (ref 22–29)
CREAT SERPL-MCNC: 0.75 MG/DL (ref 0.57–1)
GFR SERPL CREATININE-BSD FRML MDRD: 77 ML/MIN/1.73
GLUCOSE BLDC GLUCOMTR-MCNC: 121 MG/DL (ref 70–130)
GLUCOSE SERPL-MCNC: 137 MG/DL (ref 65–99)
HBA1C MFR BLD: 6.8 %
POTASSIUM SERPL-SCNC: 4.6 MMOL/L (ref 3.5–5.2)
SODIUM SERPL-SCNC: 137 MMOL/L (ref 136–145)
T4 FREE SERPL-MCNC: 1.09 NG/DL (ref 0.93–1.7)
TSH SERPL DL<=0.05 MIU/L-ACNC: 0.99 UIU/ML (ref 0.27–4.2)

## 2021-06-25 PROCEDURE — 80048 BASIC METABOLIC PNL TOTAL CA: CPT | Performed by: INTERNAL MEDICINE

## 2021-06-25 PROCEDURE — 3044F HG A1C LEVEL LT 7.0%: CPT | Performed by: INTERNAL MEDICINE

## 2021-06-25 PROCEDURE — 83036 HEMOGLOBIN GLYCOSYLATED A1C: CPT | Performed by: INTERNAL MEDICINE

## 2021-06-25 PROCEDURE — 84443 ASSAY THYROID STIM HORMONE: CPT | Performed by: INTERNAL MEDICINE

## 2021-06-25 PROCEDURE — 96372 THER/PROPH/DIAG INJ SC/IM: CPT | Performed by: INTERNAL MEDICINE

## 2021-06-25 PROCEDURE — 82306 VITAMIN D 25 HYDROXY: CPT | Performed by: INTERNAL MEDICINE

## 2021-06-25 PROCEDURE — 84439 ASSAY OF FREE THYROXINE: CPT | Performed by: INTERNAL MEDICINE

## 2021-06-25 PROCEDURE — 99214 OFFICE O/P EST MOD 30 MIN: CPT | Performed by: INTERNAL MEDICINE

## 2021-06-25 PROCEDURE — 82947 ASSAY GLUCOSE BLOOD QUANT: CPT | Performed by: INTERNAL MEDICINE

## 2021-06-25 RX ORDER — LISINOPRIL 10 MG/1
10 TABLET ORAL DAILY
Qty: 90 TABLET | Refills: 3 | Status: SHIPPED | OUTPATIENT
Start: 2021-06-25 | End: 2022-02-18 | Stop reason: SDUPTHER

## 2021-06-25 RX ORDER — ERGOCALCIFEROL 1.25 MG/1
CAPSULE ORAL
Qty: 12 CAPSULE | Refills: 3 | Status: SHIPPED | OUTPATIENT
Start: 2021-06-25 | End: 2022-02-18 | Stop reason: SDUPTHER

## 2021-06-25 RX ORDER — HYDROCORTISONE 5 MG/1
TABLET ORAL
Qty: 200 TABLET | Refills: 3 | Status: SHIPPED | OUTPATIENT
Start: 2021-06-25 | End: 2022-02-18 | Stop reason: SDUPTHER

## 2021-06-25 RX ORDER — LEVOTHYROXINE SODIUM 0.05 MG/1
50 TABLET ORAL DAILY
Qty: 90 TABLET | Refills: 3 | Status: SHIPPED | OUTPATIENT
Start: 2021-06-25 | End: 2022-04-04 | Stop reason: SDUPTHER

## 2021-06-25 RX ORDER — FLUDROCORTISONE ACETATE 0.1 MG/1
0.05 TABLET ORAL DAILY
Qty: 90 TABLET | Refills: 3 | Status: SHIPPED | OUTPATIENT
Start: 2021-06-25 | End: 2022-04-12

## 2021-06-25 RX ORDER — ALPRAZOLAM 1 MG/1
1 TABLET ORAL 4 TIMES DAILY
Qty: 70 TABLET | Refills: 0 | Status: SHIPPED | OUTPATIENT
Start: 2021-06-25 | End: 2021-08-30 | Stop reason: SDUPTHER

## 2021-06-25 RX ORDER — SIMVASTATIN 20 MG
20 TABLET ORAL NIGHTLY
Qty: 90 TABLET | Refills: 3 | Status: SHIPPED | OUTPATIENT
Start: 2021-06-25 | End: 2021-12-30

## 2021-06-25 RX ADMIN — CYANOCOBALAMIN 1000 MCG: 1000 INJECTION, SOLUTION INTRAMUSCULAR; SUBCUTANEOUS at 15:44

## 2021-06-25 NOTE — PROGRESS NOTES
"Chief complaint  Diabetes (follow up )    Subjective   Karen Rubio is a 65 y.o. female is here today for follow-up.  Follow-up for hypothyroidism and Durbin's disease. HTN and Depression. Diabetes.   Adrenal insufficiency / Shawn's disease dx in 2004. She had significant hyperpigmentation and fatigue. She was taking hydrocortisone and fludrocortisone.                                                                                                                      Diabetes mellitus type 2   On Januvia 100  mg daily.     Hypothyroidism postablative. S/p I-131 therapy at age 14. She is taking levothyroxine 50 mcg.       Patient reported that her social situation is still difficult. Divorce is not finalized yet and she has limited resources.       Medications    Current Outpatient Medications:   •  albuterol sulfate  (90 Base) MCG/ACT inhaler, Inhale 2 puffs Every 4 (Four) Hours As Needed for Wheezing., Disp: 3.7 g, Rfl: 11  •  albuterol sulfate  (90 Base) MCG/ACT inhaler, Inhale 2 puffs Every 4 (Four) Hours As Needed for Wheezing., Disp: 1 g, Rfl: 1  •  ALPRAZolam (XANAX) 1 MG tablet, Take 1 tablet by mouth 4 (Four) Times a Day., Disp: 70 tablet, Rfl: 0  •  B-D 3CC LUER-JELLY SYR 25GX1\" 25G X 1\" 3 ML misc, AS DIRECTED WITH B12 INJECTIONS, Disp: 4 each, Rfl: 3  •  carboxymethylcellulose (Lubricant Eye Drops) 0.5 % solution, Administer 1 drop to both eyes 3 (Three) Times a Day As Needed for Dry Eyes., Disp: 30 mL, Rfl: 11  •  cetirizine (zyrTEC) 10 MG tablet, TAKE 1 TABLET BY MOUTH EVERY DAY, Disp: 30 tablet, Rfl: 9  •  cyanocobalamin 1000 MCG/ML injection, INJECT 1ML INTRAMUSCULARLY EVERY 2 WEEKS AS DIRECTED, Disp: 2 mL, Rfl: 3  •  diclofenac (VOLTAREN) 75 MG EC tablet, , Disp: , Rfl:   •  fludrocortisone 0.1 MG tablet, Take 0.5 tablets by mouth Daily., Disp: 90 tablet, Rfl: 3  •  fluticasone (FLONASE) 50 MCG/ACT nasal spray, 1 spray into the nostril(s) as directed by provider Daily., Disp: 1 " bottle, Rfl: 3  •  glucose blood test strip, Test BG TID, Disp: 300 each, Rfl: 3  •  glucose monitor monitoring kit, Test BG TID, Disp: 1 each, Rfl: 0  •  hydrocortisone (CORTEF) 5 MG tablet, TAKE 4 TABLETS BY MOUTH EVERY DAY IN THE MORNING AND ALSO TAKE 1 TABLET EVERY DAY AT 3 PM (TAKE DOUBLE DOSE IN THE EVENT OF ILLNESS), Disp: 200 tablet, Rfl: 3  •  lactulose (CHRONULAC) 10 GM/15ML solution, , Disp: , Rfl:   •  Lancets misc, Test BG TID, Disp: 300 each, Rfl: 3  •  levothyroxine (SYNTHROID, LEVOTHROID) 50 MCG tablet, Take 1 tablet by mouth Daily., Disp: 90 tablet, Rfl: 3  •  lisinopril (PRINIVIL,ZESTRIL) 10 MG tablet, Take 1 tablet by mouth Daily. For BP, Disp: 90 tablet, Rfl: 3  •  montelukast (SINGULAIR) 10 MG tablet, TAKE 1 TABLET BY MOUTH EVERY DAY, Disp: 30 tablet, Rfl: 11  •  ondansetron ODT (ZOFRAN ODT) 8 MG disintegrating tablet, Take 1 tablet by mouth Every 8 (Eight) Hours As Needed for Nausea or Vomiting., Disp: 15 tablet, Rfl: 2  •  oxyCODONE-acetaminophen (PERCOCET)  MG per tablet, Take 1 tablet by mouth Every 4 (Four) Hours As Needed for Moderate Pain ., Disp: 150 tablet, Rfl: 0  •  simvastatin (ZOCOR) 20 MG tablet, Take 1 tablet by mouth Every Night., Disp: 90 tablet, Rfl: 3  •  SITagliptin (Januvia) 100 MG tablet, Take 1 tablet by mouth Daily., Disp: 90 tablet, Rfl: 3  •  vitamin D (ERGOCALCIFEROL) 1.25 MG (27750 UT) capsule capsule, TAKE 1 CAPSULE BY MOUTH ONE TIME PER WEEK, Disp: 12 capsule, Rfl: 3    Current Facility-Administered Medications:   •  cyanocobalamin injection 1,000 mcg, 1,000 mcg, Intramuscular, Q28 Days, Aleah Morales MD, 1,000 mcg at 06/25/21 1544      Review of systems  Review of Systems   Constitutional: Positive for appetite change, diaphoresis and fatigue.   Eyes: Positive for visual disturbance.   Respiratory: Positive for shortness of breath.    Endocrine: Positive for cold intolerance and heat intolerance.   Musculoskeletal: Positive for arthralgias, back pain and  "gait problem.   Neurological: Positive for dizziness, weakness and headaches.   Psychiatric/Behavioral: Positive for confusion and sleep disturbance. The patient is nervous/anxious.        Physical exam  Objective   Blood pressure 144/86, pulse 78, height 132.1 cm (52\"), weight 52.6 kg (116 lb), SpO2 98 %, not currently breastfeeding.   Physical Exam   Constitutional: She is oriented to person, place, and time. She appears well-developed.   HENT:   Head: Normocephalic and atraumatic.   Eyes: Conjunctivae are normal.   Neck:   The neck is supple and symmetric   Cardiovascular: Normal rate, regular rhythm and normal heart sounds.   Pulmonary/Chest: Effort normal and breath sounds normal.   Musculoskeletal: Deformity present.     Skin Integrity  - She has left foot ulcer (pre-ulcerative callus medial aspect second toe)..  Neurological: She is alert and oriented to person, place, and time. She has normal reflexes.   Skin: Skin is warm and dry. No rash noted.   Psychiatric: Thought content normal. Her mood appears anxious. She exhibits a depressed mood.   Vitals reviewed.        LABS AND IMAGING  Office Visit on 06/25/2021   Component Date Value Ref Range Status   • Glucose 06/25/2021 121  70 - 130 mg/dL Final   • Hemoglobin A1C 06/25/2021 6.8  % Final   • TSH 06/25/2021 0.992  0.270 - 4.200 uIU/mL Final   • Free T4 06/25/2021 1.09  0.93 - 1.70 ng/dL Final   • 25 Hydroxy, Vitamin D 06/25/2021 63.4  ng/ml Final   • Glucose 06/25/2021 137* 65 - 99 mg/dL Final   • BUN 06/25/2021 16  8 - 23 mg/dL Final   • Creatinine 06/25/2021 0.75  0.57 - 1.00 mg/dL Final   • Sodium 06/25/2021 137  136 - 145 mmol/L Final   • Potassium 06/25/2021 4.6  3.5 - 5.2 mmol/L Final   • Chloride 06/25/2021 101  98 - 107 mmol/L Final   • CO2 06/25/2021 27.4  22.0 - 29.0 mmol/L Final   • Calcium 06/25/2021 9.2  8.6 - 10.5 mg/dL Final   • eGFR Non  Amer 06/25/2021 77  >60 mL/min/1.73 Final   • BUN/Creatinine Ratio 06/25/2021 21.3  7.0 - 25.0 " Final   • Anion Gap 06/25/2021 8.6  5.0 - 15.0 mmol/L Final           Assessment  Assessment/Plan   1. Controlled type 2 diabetes mellitus without complication, without long-term current use of insulin (CMS/[facility])    2. Shawn's disease (CMS/[facility])    3. Postablative hypothyroidism    4. Essential hypertension    5. Vitamin D deficiency    6. Type 2 diabetes mellitus without complication, without long-term current use of insulin (CMS/[facility])    7. Anxiety      Orders Placed This Encounter   Procedures   • TSH   • T4, Free   • Vitamin D 25 Hydroxy   • Basic Metabolic Panel   • POC Glucose, Blood   • POC Glycosylated Hemoglobin (Hb A1C)     Plan    -Hydrocortisone dose: 20 mg in am and 5 mg in pm.    -cont 1/2 tab fludrocortisone    -HTN - lisinopril 10 mg daily.     --continue Januvia 100 mg daily, encouraged to continue taking it          -continue Levothyroxine 50 mcg a day. Thyroid function was normal on last labs.     - check vitamin d     B12 administered today.     Follow-up in 4 months

## 2021-07-13 ENCOUNTER — TELEPHONE (OUTPATIENT)
Dept: FAMILY MEDICINE CLINIC | Facility: CLINIC | Age: 66
End: 2021-07-13

## 2021-07-13 NOTE — TELEPHONE ENCOUNTER
Patient has an appt scheduled with Rajani on Monday 7/19 to discuss medication refills and changes. She is having severe anxiety due to her living situation and is not sleeping. She asked if something could be sent in until her video visit with Rajani so she could get some rest. She was unable to keep her last video visit because her  disconnected her cell phone service. She is very upset and distraught, and she is staying with her mother in the hospital right now. I let her know someone would give her a call back as soon as possible. Thanks!

## 2021-08-30 DIAGNOSIS — F41.9 ANXIETY: ICD-10-CM

## 2021-08-30 RX ORDER — ALPRAZOLAM 1 MG/1
1 TABLET ORAL 4 TIMES DAILY
Qty: 28 TABLET | Refills: 0 | Status: SHIPPED | OUTPATIENT
Start: 2021-08-30 | End: 2023-03-20 | Stop reason: SDUPTHER

## 2021-08-30 NOTE — TELEPHONE ENCOUNTER
Notified patient we can send in a week supply and that is it; will have to make appt before we get any refills; she was transferred up front to schedule.

## 2021-08-30 NOTE — TELEPHONE ENCOUNTER
Caller: Karen Rubio    Relationship: Self    Best call back number: [phone] (H)    Medication needed:   ALPRAZolam (XANAX) 1 MG tablet  1 mg, 4 Times Daily 0 ordered         Summary: Take 1 tablet by mouth 4 (Four) Times a Day., Starting Fri 6/25/2021, Print   Dose, Route, Frequency: 1 mg, Oral, 4 Times Daily          When do you need the refill by: TODAY    What additional details did the patient provide when requesting the medication:   PATIENT STATES THAT SHE IS OUT OF HER MEDICATION AND SHE IS SHAKING    Does the patient have less than a 3 day supply:  [x] Yes  [] No    What is the patient's preferred pharmacy:    [facility], [facility]. [place] [address] [address]  [phone] [facility] [phone] FX      PATIENT HAS BEEN ADVISED THAT THIS REQUEST HAS BEEN MARKED AS A HIGH PRIORITY, PLEASE ALLOW 48 HOURS FOR OUR CLINICAL TEAM TO FOLLOW UP ON THIS REQUEST.

## 2021-09-08 DIAGNOSIS — F41.9 ANXIETY: ICD-10-CM

## 2021-09-08 RX ORDER — CYANOCOBALAMIN 1000 UG/ML
INJECTION, SOLUTION INTRAMUSCULAR; SUBCUTANEOUS
Qty: 2 ML | Refills: 3 | Status: SHIPPED | OUTPATIENT
Start: 2021-09-08 | End: 2022-06-08

## 2021-09-08 RX ORDER — ALPRAZOLAM 1 MG/1
TABLET ORAL
Qty: 28 TABLET | Refills: 0 | OUTPATIENT
Start: 2021-09-08

## 2021-09-08 RX ORDER — CETIRIZINE HYDROCHLORIDE 10 MG/1
TABLET ORAL
Qty: 30 TABLET | Refills: 0 | OUTPATIENT
Start: 2021-09-08

## 2021-09-22 ENCOUNTER — TELEPHONE (OUTPATIENT)
Dept: ENDOCRINOLOGY | Facility: CLINIC | Age: 66
End: 2021-09-22

## 2021-09-22 NOTE — TELEPHONE ENCOUNTER
PT called very upset saying that someone had broken into her cabinet and had dumped all of her medicine and it was a big mess and that she could not tell what was what. I advised her that from our stand point there wasn't much we could do because her medications had refills on them but that I doubted that her insurance would allow them to fill them early. I suggested that she speak to the pharmacy and see if the could do an emergency over ride. She continued to tell me all the horrible things that were going on in her life and repeating that she needed her medication. She kept saying that she had her pills but that did not know which one was which. I advised her to take the pills to the pharmacy and she if they could help her sort them out. She told me that she had taken her sugar medication and thyroid medication but that she needed to take the rest of it. I continued to try to tell her to see if the pharmacy could help her, until the phone went dead or she hung up.

## 2021-09-28 DIAGNOSIS — J30.2 SEASONAL ALLERGIC RHINITIS: ICD-10-CM

## 2021-09-30 ENCOUNTER — TELEPHONE (OUTPATIENT)
Dept: ENDOCRINOLOGY | Facility: CLINIC | Age: 66
End: 2021-09-30

## 2021-09-30 NOTE — TELEPHONE ENCOUNTER
Returned pt call, she stated that  The pharmacy is not refilling her RX and telling her that she does not have refills.  I advised pt that she does, some for 90 days with 3 refills.  I have faxed a copy of all the RX Dr frederick has written that has refills and asked them to fill them.

## 2021-10-01 RX ORDER — MONTELUKAST SODIUM 10 MG/1
TABLET ORAL
Qty: 30 TABLET | Refills: 5 | Status: SHIPPED | OUTPATIENT
Start: 2021-10-01 | End: 2022-04-04 | Stop reason: SDUPTHER

## 2021-11-18 ENCOUNTER — TELEPHONE (OUTPATIENT)
Dept: ENDOCRINOLOGY | Facility: CLINIC | Age: 66
End: 2021-11-18

## 2021-12-03 DIAGNOSIS — F41.9 ANXIETY: ICD-10-CM

## 2021-12-03 NOTE — TELEPHONE ENCOUNTER
"The HUB transferred pt phone call to us, pt told the HUB she was \"contemplating suicide\" but \"wasn't sure if she was going to.\". lani Arce[facility] kept pt on the phone talking, I called 911 and was transferred to [place] dispatch. Spoke with a man, lelia #[id number] and he said they would send EMT/police to the patients home.  "

## 2021-12-03 NOTE — TELEPHONE ENCOUNTER
Caller: Karen Rubio    Relationship: Self    Best call back number: [phone]     Requested Prescriptions:   Requested Prescriptions     Pending Prescriptions Disp Refills   • ALPRAZolam (XANAX) 1 MG tablet 28 tablet 0     Sig: Take 1 tablet by mouth 4 (Four) Times a Day.        Pharmacy where request should be sent: [facility], [address] [phone]  - [phone]     Additional details provided by patient: OUT OF MEDICATION. PATIENT STATES SHE IS STRUGGLING TO SLEEP AND VERY ANXIOUS. PATIENT STATES SHE CAN SCHEDULE A TELEHEALTH BEFORE PRE-OP ON 2/21/22    Does the patient have less than a 3 day supply:  [x] Yes  [] No    Clay Lehman Rep   12/03/21 15:40 EST

## 2021-12-06 RX ORDER — ALPRAZOLAM 1 MG/1
1 TABLET ORAL 4 TIMES DAILY
Qty: 28 TABLET | Refills: 0 | OUTPATIENT
Start: 2021-12-06

## 2021-12-08 ENCOUNTER — OFFICE VISIT (OUTPATIENT)
Dept: ENDOCRINOLOGY | Facility: CLINIC | Age: 66
End: 2021-12-08

## 2021-12-08 VITALS
HEIGHT: 55 IN | OXYGEN SATURATION: 98 % | SYSTOLIC BLOOD PRESSURE: 142 MMHG | WEIGHT: 121 LBS | DIASTOLIC BLOOD PRESSURE: 92 MMHG | HEART RATE: 78 BPM | BODY MASS INDEX: 28 KG/M2

## 2021-12-08 DIAGNOSIS — E27.1 ADDISON'S DISEASE (HCC): Chronic | ICD-10-CM

## 2021-12-08 DIAGNOSIS — E11.9 CONTROLLED TYPE 2 DIABETES MELLITUS WITHOUT COMPLICATION, WITHOUT LONG-TERM CURRENT USE OF INSULIN (HCC): Primary | ICD-10-CM

## 2021-12-08 DIAGNOSIS — E55.9 VITAMIN D DEFICIENCY, UNSPECIFIED: ICD-10-CM

## 2021-12-08 DIAGNOSIS — E53.8 VITAMIN B12 DEFICIENCY: ICD-10-CM

## 2021-12-08 DIAGNOSIS — E89.0 POSTABLATIVE HYPOTHYROIDISM: Chronic | ICD-10-CM

## 2021-12-08 LAB
EXPIRATION DATE: ABNORMAL
EXPIRATION DATE: NORMAL
GLUCOSE BLDC GLUCOMTR-MCNC: 262 MG/DL (ref 70–130)
HBA1C MFR BLD: 6.9 %
Lab: ABNORMAL
Lab: NORMAL

## 2021-12-08 PROCEDURE — 82947 ASSAY GLUCOSE BLOOD QUANT: CPT | Performed by: INTERNAL MEDICINE

## 2021-12-08 PROCEDURE — 3044F HG A1C LEVEL LT 7.0%: CPT | Performed by: INTERNAL MEDICINE

## 2021-12-08 PROCEDURE — 83036 HEMOGLOBIN GLYCOSYLATED A1C: CPT | Performed by: INTERNAL MEDICINE

## 2021-12-08 PROCEDURE — 99214 OFFICE O/P EST MOD 30 MIN: CPT | Performed by: INTERNAL MEDICINE

## 2021-12-08 PROCEDURE — 96372 THER/PROPH/DIAG INJ SC/IM: CPT | Performed by: INTERNAL MEDICINE

## 2021-12-08 RX ORDER — GABAPENTIN 300 MG/1
CAPSULE ORAL
COMMUNITY
Start: 2021-12-01

## 2021-12-08 RX ORDER — BUPRENORPHINE HYDROCHLORIDE 75 UG/1
FILM, SOLUBLE BUCCAL
COMMUNITY
Start: 2021-12-01

## 2021-12-08 RX ORDER — CYANOCOBALAMIN 1000 UG/ML
1000 INJECTION, SOLUTION INTRAMUSCULAR; SUBCUTANEOUS
Status: DISCONTINUED | OUTPATIENT
Start: 2021-12-08 | End: 2023-04-06

## 2021-12-08 NOTE — PROGRESS NOTES
"Chief complaint  Diabetes (Follow Up), Hypothyroidism, and Shawn's Disease    Subjective   Karen Rubio is a 66 y.o. female is here today for follow-up.  Follow-up for hypothyroidism and [place]atawba's disease. HTN and Depression. Diabetes.   Adrenal insufficiency / Catawba's disease dx in 2004. She had significant hyperpigmentation and fatigue. She was taking hydrocortisone and fludrocortisone.                                                                                                                      Diabetes mellitus type 2   On Januvia 100  mg daily.     Hypothyroidism postablative. S/p I-131 therapy at age 14. She is taking levothyroxine 50 mcg.       Patient reported that her social situation is still difficult. Divorce is not finalized yet and she has limited resources. She does not have access to the car and not able to buy groceries or go to the pharmacy to  medications without asking someone to ride her.    BP is elevated and she has started taking the medication.      Medications    Current Outpatient Medications:   •  albuterol sulfate  (90 Base) MCG/ACT inhaler, Inhale 2 puffs Every 4 (Four) Hours As Needed for Wheezing., Disp: 3.7 g, Rfl: 11  •  albuterol sulfate  (90 Base) MCG/ACT inhaler, Inhale 2 puffs Every 4 (Four) Hours As Needed for Wheezing., Disp: 1 g, Rfl: 1  •  ALPRAZolam (XANAX) 1 MG tablet, Take 1 tablet by mouth 4 (Four) Times a Day., Disp: 28 tablet, Rfl: 0  •  B-D 3CC LUER-JELLY SYR 25GX1\" 25G X 1\" 3 ML misc, AS DIRECTED WITH B12 INJECTIONS, Disp: 4 each, Rfl: 3  •  Belbuca 75 MCG film, , Disp: , Rfl:   •  carboxymethylcellulose (Lubricant Eye Drops) 0.5 % solution, Administer 1 drop to both eyes 3 (Three) Times a Day As Needed for Dry Eyes., Disp: 30 mL, Rfl: 11  •  cetirizine (zyrTEC) 10 MG tablet, TAKE 1 TABLET BY MOUTH EVERY DAY, Disp: 30 tablet, Rfl: 9  •  cyanocobalamin 1000 MCG/ML injection, INJECT 1ML INTRAMUSCULARLY EVERY 2 WEEKS AS DIRECTED, Disp: 2 " mL, Rfl: 3  •  diclofenac (VOLTAREN) 75 MG EC tablet, , Disp: , Rfl:   •  fludrocortisone 0.1 MG tablet, Take 0.5 tablets by mouth Daily., Disp: 90 tablet, Rfl: 3  •  fluticasone (FLONASE) 50 MCG/ACT nasal spray, 1 spray into the nostril(s) as directed by provider Daily., Disp: 1 bottle, Rfl: 3  •  gabapentin (NEURONTIN) 300 MG capsule, , Disp: , Rfl:   •  glucose blood test strip, Test BG TID, Disp: 300 each, Rfl: 3  •  glucose monitor monitoring kit, Test BG TID, Disp: 1 each, Rfl: 0  •  hydrocortisone (CORTEF) 5 MG tablet, TAKE 4 TABLETS BY MOUTH EVERY DAY IN THE MORNING AND ALSO TAKE 1 TABLET EVERY DAY AT 3 PM (TAKE DOUBLE DOSE IN THE EVENT OF ILLNESS), Disp: 200 tablet, Rfl: 3  •  lactulose (CHRONULAC) 10 GM/15ML solution, , Disp: , Rfl:   •  Lancets misc, Test BG TID, Disp: 300 each, Rfl: 3  •  levothyroxine (SYNTHROID, LEVOTHROID) 50 MCG tablet, Take 1 tablet by mouth Daily., Disp: 90 tablet, Rfl: 3  •  lisinopril (PRINIVIL,ZESTRIL) 10 MG tablet, Take 1 tablet by mouth Daily. For BP, Disp: 90 tablet, Rfl: 3  •  montelukast (SINGULAIR) 10 MG tablet, TAKE ONE TABLET BY MOUTH EVERY NIGHT, Disp: 30 tablet, Rfl: 5  •  ondansetron ODT (ZOFRAN ODT) 8 MG disintegrating tablet, Take 1 tablet by mouth Every 8 (Eight) Hours As Needed for Nausea or Vomiting., Disp: 15 tablet, Rfl: 2  •  oxyCODONE-acetaminophen (PERCOCET)  MG per tablet, Take 1 tablet by mouth Every 4 (Four) Hours As Needed for Moderate Pain ., Disp: 150 tablet, Rfl: 0  •  simvastatin (ZOCOR) 20 MG tablet, Take 1 tablet by mouth Every Night., Disp: 90 tablet, Rfl: 3  •  SITagliptin (Januvia) 100 MG tablet, Take 1 tablet by mouth Daily., Disp: 90 tablet, Rfl: 3  •  vitamin D (ERGOCALCIFEROL) 1.25 MG (01759 UT) capsule capsule, TAKE 1 CAPSULE BY MOUTH ONE TIME PER WEEK, Disp: 12 capsule, Rfl: 3  •  cyanocobalamin ([employer] Vitamin B-12) 1000 MCG tablet, Take 1 tablet by mouth Daily., Disp: 90 tablet, Rfl: 3  No current facility-administered medications for  "this visit.      Review of systems  Review of Systems   Constitutional: Positive for appetite change, diaphoresis and fatigue.   Eyes: Positive for visual disturbance.   Musculoskeletal: Positive for arthralgias, back pain and gait problem.   Neurological: Positive for dizziness, weakness and headaches.   Psychiatric/Behavioral: Positive for confusion and sleep disturbance. The patient is nervous/anxious.        Physical exam  Objective   Blood pressure 142/92, pulse 78, height 132.1 cm (52\"), weight 54.9 kg (121 lb), SpO2 98 %, not currently breastfeeding.   Physical Exam   Constitutional: She is oriented to person, place, and time. She appears well-developed.   HENT:   Head: Normocephalic and atraumatic.   Eyes: Conjunctivae are normal.   Neck: No thyromegaly present.   Cardiovascular: Normal rate, regular rhythm, normal heart sounds and normal pulses.   Pulmonary/Chest: Effort normal and breath sounds normal.   Neurological: She is alert and oriented to person, place, and time.   Psychiatric: Thought content normal.   Vitals reviewed.        LABS AND IMAGING  Office Visit on 12/08/2021   Component Date Value Ref Range Status   • Hemoglobin A1C 12/08/2021 6.9  % Final   • Lot Number 12/08/2021 [id number]   Final   • Expiration Date 12/08/2021 08/30/2023   Final   • Glucose 12/08/2021 262* 70 - 130 mg/dL Final   • Lot Number 12/08/2021 [id number]   Final   • Expiration Date 12/08/2021 07/21/2022   Final           Assessment  Assessment/Plan   1. Controlled type 2 diabetes mellitus without complication, without long-term current use of insulin ([facility])    2. Postablative hypothyroidism    3. Daw[place]'s disease (HCC)    4. Vitamin D deficiency, unspecified       Orders Placed This Encounter   Procedures   • Comprehensive Metabolic Panel   • TSH   • T4, Free   • Vitamin D 25 Hydroxy   • POC Glycosylated Hemoglobin (Hb A1C)   • POC Glucose, Blood     Plan    -Hydrocortisone dose: 20 mg in am and 5 mg in pm.  It appears that " she is doing well without fludrocortisone and her BP is high. No salt cravings.     -HTN - lisinopril 10 mg daily.          - DM 2 - Continue Januvia 100 mg daily,          -continue Levothyroxine 50 mcg a day. Thyroid function was normal on last labs.     - check vitamin d and continue Vit D 1000 IU daily.     B12 administered today. Her levels were low and     Follow-up in 4 months

## 2021-12-08 NOTE — PATIENT INSTRUCTIONS
Karen Rubio is a patient with multiple medical conditions and she requires transportation for receiving her medications from the pharmacy and purchasing the food from the grocery store. She needs to be able to come for her appointments.   She is currently not having transportation because her spouse has kept her vehicle.     Please assist Karen Rubio in receiving a means of transportation she is entitled to.     If you have any questions, please dont hesitate to call our office.     Sincerely,     Aleah Morales MD

## 2021-12-09 LAB
25(OH)D3+25(OH)D2 SERPL-MCNC: 51.9 NG/ML (ref 30–100)
ALBUMIN SERPL-MCNC: 4.3 G/DL (ref 3.8–4.8)
ALBUMIN/GLOB SERPL: 1.8 {RATIO} (ref 1.2–2.2)
ALP SERPL-CCNC: 75 IU/L (ref 44–121)
ALT SERPL-CCNC: 15 IU/L (ref 0–32)
AST SERPL-CCNC: 24 IU/L (ref 0–40)
BILIRUB SERPL-MCNC: 0.6 MG/DL (ref 0–1.2)
BUN SERPL-MCNC: 10 MG/DL (ref 8–27)
BUN/CREAT SERPL: 12 (ref 12–28)
CALCIUM SERPL-MCNC: 9.3 MG/DL (ref 8.7–10.3)
CHLORIDE SERPL-SCNC: 103 MMOL/L (ref 96–106)
CO2 SERPL-SCNC: 24 MMOL/L (ref 20–29)
CREAT SERPL-MCNC: 0.81 MG/DL (ref 0.57–1)
GLOBULIN SER CALC-MCNC: 2.4 G/DL (ref 1.5–4.5)
GLUCOSE SERPL-MCNC: 174 MG/DL (ref 65–99)
POTASSIUM SERPL-SCNC: 4.3 MMOL/L (ref 3.5–5.2)
PROT SERPL-MCNC: 6.7 G/DL (ref 6–8.5)
SODIUM SERPL-SCNC: 142 MMOL/L (ref 134–144)
T4 FREE SERPL-MCNC: 1.27 NG/DL (ref 0.82–1.77)
TSH SERPL DL<=0.005 MIU/L-ACNC: 0.54 UIU/ML (ref 0.45–4.5)

## 2021-12-14 RX ADMIN — CYANOCOBALAMIN 1000 MCG: 1000 INJECTION, SOLUTION INTRAMUSCULAR; SUBCUTANEOUS at 12:00

## 2021-12-14 RX ADMIN — CYANOCOBALAMIN 1000 MCG: 1000 INJECTION, SOLUTION INTRAMUSCULAR; SUBCUTANEOUS at 16:25

## 2021-12-27 NOTE — TELEPHONE ENCOUNTER
----- Message from Bharti Davis sent at 6/5/2017  2:00 PM EDT -----  Patient has broken a tooth (infected) and would like an antibiotic and she only can take Amoxicillin. called in to [facility] [place]..  ThanksBharti..   Pt with pancytopenia  anc: 1400  hgb 10 (hx of hematamesis, denies currently, denies melena)  ptl : 61  unclear etiology, covid? ESRD?   Monitor cbc  f/u hiv, [place], rpr, hcv  consider heme consult?

## 2021-12-30 RX ORDER — CETIRIZINE HYDROCHLORIDE 10 MG/1
TABLET ORAL
Qty: 30 TABLET | Refills: 6 | Status: SHIPPED | OUTPATIENT
Start: 2021-12-30 | End: 2023-02-02

## 2021-12-30 RX ORDER — SIMVASTATIN 20 MG
TABLET ORAL
Qty: 90 TABLET | Refills: 2 | Status: SHIPPED | OUTPATIENT
Start: 2021-12-30 | End: 2022-07-28 | Stop reason: SDUPTHER

## 2022-01-24 ENCOUNTER — TELEPHONE (OUTPATIENT)
Dept: ENDOCRINOLOGY | Facility: CLINIC | Age: 67
End: 2022-01-24

## 2022-01-24 NOTE — TELEPHONE ENCOUNTER
Patient called she has been vomiting and not able to keep anything down. Having stomach pain and a horrible flare up with her condition. Wants to know if something can be called into her pharmacy

## 2022-01-25 NOTE — TELEPHONE ENCOUNTER
Returned pt call., Left detailed message on VM advising pt that she should see [facility] or PCP, as it could be related to something else.

## 2022-02-18 DIAGNOSIS — E27.1 ADDISON'S DISEASE: Chronic | ICD-10-CM

## 2022-02-18 DIAGNOSIS — E55.9 VITAMIN D DEFICIENCY: ICD-10-CM

## 2022-02-18 NOTE — TELEPHONE ENCOUNTER
PT CALLED REQUESTING VIT D, HYDROCHORTISONE, AND LISINOPRIL TO BE SENT IN TO [facility]. PLEASE AND THANK YOU

## 2022-02-22 RX ORDER — ERGOCALCIFEROL 1.25 MG/1
CAPSULE ORAL
Qty: 12 CAPSULE | Refills: 3 | Status: SHIPPED | OUTPATIENT
Start: 2022-02-22 | End: 2022-04-04 | Stop reason: SDUPTHER

## 2022-02-22 RX ORDER — LISINOPRIL 10 MG/1
10 TABLET ORAL DAILY
Qty: 90 TABLET | Refills: 3 | Status: SHIPPED | OUTPATIENT
Start: 2022-02-22 | End: 2022-04-04 | Stop reason: SDUPTHER

## 2022-02-22 RX ORDER — HYDROCORTISONE 5 MG/1
TABLET ORAL
Qty: 200 TABLET | Refills: 3 | Status: SHIPPED | OUTPATIENT
Start: 2022-02-22 | End: 2022-04-04 | Stop reason: SDUPTHER

## 2022-02-28 RX ORDER — ALBUTEROL SULFATE 90 UG/1
AEROSOL, METERED RESPIRATORY (INHALATION)
Qty: 18 G | Refills: 5 | OUTPATIENT
Start: 2022-02-28

## 2022-03-08 ENCOUNTER — TELEPHONE (OUTPATIENT)
Dept: FAMILY MEDICINE CLINIC | Facility: CLINIC | Age: 67
End: 2022-03-08

## 2022-03-08 NOTE — TELEPHONE ENCOUNTER
Caller: CHARMAINE    Relationship:      Best call back number:[phone]    What is the best time to reach you: ANY TIME    Who are you requesting to speak with (clinical staff, provider,  specific staff member): DR SMITH    Do you know the name of the person who called: CHARMAINE    What was the call regarding: CHARMAINE FROM [employer] CALLED STATING PATIENT IS HAVING TROUBLE GETTING HER MEDICATIONS, LOST GLUCOMETER, NO RELIABLE TRANSPORTATION, GOING THROUGH DIVORCE AND JUST HAVING A HARD TIME. CHARMAINE WANTED TO LET  DR SMITH KNOW OF WHAT IS GOING ON WITH PATIENT. IF PATIENT DECIDES SCRIPTS CAN BE SENT TO [employer] MAIL ORDER -[phone] TO MAKE SURE SHE IS GETTING ALL HER MEDICATIONS. PATIENT BELIEVES SHE HAS CANCER AGAIN AND IS ALL WORKED UP ABOUT THAT AS WELL.    Do you require a callback: NO

## 2022-03-10 ENCOUNTER — TELEMEDICINE (OUTPATIENT)
Dept: FAMILY MEDICINE CLINIC | Facility: CLINIC | Age: 67
End: 2022-03-10

## 2022-03-10 DIAGNOSIS — F32.9 MAJOR DEPRESSIVE DISORDER WITH CURRENT ACTIVE EPISODE, UNSPECIFIED DEPRESSION EPISODE SEVERITY, UNSPECIFIED WHETHER RECURRENT: ICD-10-CM

## 2022-03-10 DIAGNOSIS — M41.9 KYPHOSCOLIOSIS DEFORMITY OF SPINE: ICD-10-CM

## 2022-03-10 DIAGNOSIS — F41.9 ANXIETY: Primary | ICD-10-CM

## 2022-03-10 PROCEDURE — 99213 OFFICE O/P EST LOW 20 MIN: CPT | Performed by: FAMILY MEDICINE

## 2022-03-10 NOTE — PROGRESS NOTES
Subjective   Karen Rubio is a 66 y.o. female   Patient requested consents to telemedicine visit using audiovisual aid.    Chief Complaint    Anxiety  Scoliosis    History of Present Illness  Patient presents via telemedicine visit using assistance from a friend and their phone to use [employer] for connection.  She is crying through the entire conversation.  Apparently her  left her and took  most of the items in the house and took her car so she has  had significant transportation problems.  She says a nurse from [facility] has been trying to help her but they have been unable to get anyone to do a home visit for some reason.  The patient says all of them have had Covid and could not keep their appointments.  Somewhere along the line she saw an orthopedic surgeon in [place] where she lives who did some x-rays and discovered that she had scoliosis.    I told her that I would see if we can get home health to come out and evaluate her and also involve  somehow.  She is agreeable to this.    The following portions of the patient's history were reviewed and updated as appropriate: allergies, current medications, past social history and problem list    Review of Systems   Constitutional: Negative.  Negative for appetite change and fatigue.   Respiratory: Negative.  Negative for chest tightness and shortness of breath.    Gastrointestinal: Negative.  Negative for abdominal pain, diarrhea and nausea.   Musculoskeletal: Positive for back pain. Negative for arthralgias, gait problem and myalgias.   Neurological: Negative for dizziness, tremors, weakness, light-headedness, numbness and headaches.   Psychiatric/Behavioral: Positive for dysphoric mood and sleep disturbance. Negative for agitation, behavioral problems, confusion, decreased concentration and suicidal ideas. The patient is nervous/anxious.        Objective     There were no vitals filed for this visit.    Physical Exam  HENT:      Head:  Normocephalic and atraumatic.   Pulmonary:      Effort: Pulmonary effort is normal. No respiratory distress.   Neurological:      Mental Status: She is alert and oriented to person, place, and time.   Psychiatric:         Mood and Affect: Mood is anxious. Affect is tearful.         Speech: Speech is rapid and pressured.         Behavior: Behavior is agitated.         Thought Content: Thought content does not include homicidal or suicidal ideation.         Assessment/Plan   Problems Addressed this Visit        Mental Health    Anxiety - Primary    Relevant Orders    Ambulatory Referral to Social Work      Other Visit Diagnoses     Kyphoscoliosis deformity of spine        Major depressive disorder with current active episode, unspecified depression episode severity, unspecified whether recurrent        Relevant Orders    Ambulatory Referral to Social Work      Diagnoses       Codes Comments    Anxiety    -  Primary ICD-10-CM: F41.9  ICD-9-CM: 300.00     Kyphoscoliosis deformity of spine     ICD-10-CM: M41.9  ICD-9-CM: 737.30     Major depressive disorder with current active episode, unspecified depression episode severity, unspecified whether recurrent     ICD-10-CM: F32.9  ICD-9-CM: 296.30         I spent 25 minutes in patient care: Reviewing records prior to the visit, examining the patient, entering orders and documentation    Part of this note may be an electronic transcription/translation of spoken language to printed text using the Dragon Dictation System.

## 2022-03-11 DIAGNOSIS — F32.9 MAJOR DEPRESSIVE DISORDER WITH CURRENT ACTIVE EPISODE, UNSPECIFIED DEPRESSION EPISODE SEVERITY, UNSPECIFIED WHETHER RECURRENT: Primary | ICD-10-CM

## 2022-03-11 DIAGNOSIS — F41.9 ANXIETY: ICD-10-CM

## 2022-03-15 ENCOUNTER — PATIENT OUTREACH (OUTPATIENT)
Dept: CASE MANAGEMENT | Facility: OTHER | Age: 67
End: 2022-03-15

## 2022-03-15 NOTE — OUTREACH NOTE
Adult Patient Profile  Questions/Answers    Flowsheet Row Most Recent Value   Importance of Change 8   Confidence to Make Change 8   Readiness to Change 9   People in Home alone   Current Living Arrangements apartment        Adult Wellness Assessment  Questions/Answers    Flowsheet Row Most Recent Value   Help with Reading Health-Related Information often   Problems Learning about Medical Condition often   Confidence in Filling Out Forms by Self often        Social Work Assessment  Questions/Answers    Flowsheet Row Most Recent Value   Advance Care Planning Reviewed no concerns identified   People in Home alone   Current Living Arrangements apartment   Primary Care Provided by self   Provides Primary Care For no one, unable/limited ability to care for self   Employment Status disabled   Source of Income disability   Plan --  [Pt reported that she is in need of a rollator and also suffers from anixety and depression. ]   Plan Comments --  [SW told pt that she will let DR. Thomas know that pt would like a Rollator. and provided pt with the info for telehealth counseling. ]   Final Note --  [SW will continue to follow pt. ]        SDOH updated and reviewed with the patient during this program:  Financial Resource Strain: High Risk   • Difficulty of Paying Living Expenses: Hard      Food Insecurity: No Food Insecurity   • Worried About Running Out of Food in the Last Year: Never true   • Ran Out of Food in the Last Year: Never true      Transportation Needs: Unmet Transportation Needs   • Lack of Transportation (Medical): Yes   • Lack of Transportation (Non-Medical): Yes      Housing Stability: Low Risk    • Unable to Pay for Housing in the Last Year: No   • Number of Places Lived in the Last Year: 1   • Unstable Housing in the Last Year: No      Continuing Care   [facility]    [address], [facility] [address]    Phone: [phone]    Resource for: Intimate  Partner Violence       Care Coordination    SW sent PCP a message regarding orders for a Rollator.    Liza PARNELL    Ambulatory Case Management    3/15/2022 16:27 EDT

## 2022-03-16 ENCOUNTER — PATIENT OUTREACH (OUTPATIENT)
Dept: CASE MANAGEMENT | Facility: OTHER | Age: 67
End: 2022-03-16

## 2022-03-16 NOTE — OUTREACH NOTE
Patient Outreach    SW followed up with pt today regarding the rollator and to expect a call from [facility]. Pt reported that [facility] has already called her to discuss it. SW asked pt if she was able to get in touch with a counselor to schedule an appt. Pt reported that her biggest concern now is getting up and down the steps to get in and out of her apt. Pt reported that her apt has 13 steps and pt is having a difficult time getting up and down. Pt has submitted a request to the [facility] for an apt on the first floor but was told that she has 3 people in front of her that are already on the list. Pt reported that she would like her PCP to write a letter to the  explaining that she is unable to walk down stairs and would need to have a first floor apt. SW asked for leasing agents name and phone number to gather more information on this situation. Pt provided SW the name of Kenia Marks at [facility].    Care Coordination    SW reached out to Kenia at [facility] but had to leave a  for a return call.         ZOE will continue to follow case    Liza PARNELL    Ambulatory Case Management    3/16/2022 12:12 EDT

## 2022-04-04 DIAGNOSIS — E89.0 POSTABLATIVE HYPOTHYROIDISM: Chronic | ICD-10-CM

## 2022-04-04 DIAGNOSIS — E55.9 VITAMIN D DEFICIENCY: ICD-10-CM

## 2022-04-04 DIAGNOSIS — J30.2 SEASONAL ALLERGIC RHINITIS: ICD-10-CM

## 2022-04-04 DIAGNOSIS — E27.1 ADDISON'S DISEASE: Chronic | ICD-10-CM

## 2022-04-04 DIAGNOSIS — E11.9 TYPE 2 DIABETES MELLITUS WITHOUT COMPLICATION, WITHOUT LONG-TERM CURRENT USE OF INSULIN: ICD-10-CM

## 2022-04-04 NOTE — TELEPHONE ENCOUNTER
Patient called stated she is low on all of these medications. Please advise.     Last ov 12/8/21  Follow up scheduled 4/12/22

## 2022-04-06 ENCOUNTER — TELEPHONE (OUTPATIENT)
Dept: FAMILY MEDICINE CLINIC | Facility: CLINIC | Age: 67
End: 2022-04-06

## 2022-04-06 RX ORDER — MONTELUKAST SODIUM 10 MG/1
10 TABLET ORAL
Qty: 90 TABLET | Refills: 1 | Status: SHIPPED | OUTPATIENT
Start: 2022-04-06 | End: 2022-07-28 | Stop reason: SDUPTHER

## 2022-04-06 RX ORDER — HYDROCORTISONE 5 MG/1
TABLET ORAL
Qty: 200 TABLET | Refills: 3 | Status: SHIPPED | OUTPATIENT
Start: 2022-04-06 | End: 2022-07-28 | Stop reason: SDUPTHER

## 2022-04-06 RX ORDER — LEVOTHYROXINE SODIUM 0.05 MG/1
50 TABLET ORAL DAILY
Qty: 90 TABLET | Refills: 1 | Status: SHIPPED | OUTPATIENT
Start: 2022-04-06 | End: 2023-01-01 | Stop reason: SDUPTHER

## 2022-04-06 RX ORDER — FLUTICASONE PROPIONATE 50 MCG
1 SPRAY, SUSPENSION (ML) NASAL DAILY
Qty: 1 G | Refills: 2 | Status: SHIPPED | OUTPATIENT
Start: 2022-04-06

## 2022-04-06 RX ORDER — LISINOPRIL 10 MG/1
10 TABLET ORAL DAILY
Qty: 90 TABLET | Refills: 1 | Status: SHIPPED | OUTPATIENT
Start: 2022-04-06 | End: 2022-04-12 | Stop reason: SDUPTHER

## 2022-04-06 RX ORDER — ERGOCALCIFEROL 1.25 MG/1
CAPSULE ORAL
Qty: 12 CAPSULE | Refills: 1 | Status: SHIPPED | OUTPATIENT
Start: 2022-04-06 | End: 2022-07-28 | Stop reason: SDUPTHER

## 2022-04-06 NOTE — TELEPHONE ENCOUNTER
Notified patient that we do not write letters for emotional support animals, this will need to come from a behavioral health specialist

## 2022-04-06 NOTE — TELEPHONE ENCOUNTER
PATIENT CALLED BACK STATING THE APARTMENT MANAGE WILL NOT BE IN THE OFFICE THE REMAINDER OF THE WEEK AND SHE NEEDS THIS LETTER ASAP SO SHE WILL NOT LOSE OUT ON GETTING HER DOG.    PLEASE ADVISE AND CALL PATIENT [phone]

## 2022-04-12 ENCOUNTER — OFFICE VISIT (OUTPATIENT)
Dept: ENDOCRINOLOGY | Facility: CLINIC | Age: 67
End: 2022-04-12

## 2022-04-12 VITALS
HEART RATE: 98 BPM | BODY MASS INDEX: 28.6 KG/M2 | WEIGHT: 123.6 LBS | SYSTOLIC BLOOD PRESSURE: 136 MMHG | DIASTOLIC BLOOD PRESSURE: 88 MMHG | OXYGEN SATURATION: 98 % | HEIGHT: 55 IN

## 2022-04-12 DIAGNOSIS — E27.1 ADDISON'S DISEASE: ICD-10-CM

## 2022-04-12 DIAGNOSIS — E53.8 COBALAMIN DEFICIENCY: ICD-10-CM

## 2022-04-12 DIAGNOSIS — E55.9 VITAMIN D DEFICIENCY: ICD-10-CM

## 2022-04-12 DIAGNOSIS — I10 ESSENTIAL HYPERTENSION: Chronic | ICD-10-CM

## 2022-04-12 DIAGNOSIS — E11.9 CONTROLLED TYPE 2 DIABETES MELLITUS WITHOUT COMPLICATION, WITHOUT LONG-TERM CURRENT USE OF INSULIN: Primary | ICD-10-CM

## 2022-04-12 DIAGNOSIS — E78.2 MIXED HYPERLIPIDEMIA: ICD-10-CM

## 2022-04-12 DIAGNOSIS — E89.0 POSTABLATIVE HYPOTHYROIDISM: Chronic | ICD-10-CM

## 2022-04-12 LAB
EXPIRATION DATE: ABNORMAL
EXPIRATION DATE: NORMAL
GLUCOSE BLDC GLUCOMTR-MCNC: 200 MG/DL (ref 70–130)
HBA1C MFR BLD: 6.3 %
Lab: ABNORMAL
Lab: NORMAL

## 2022-04-12 PROCEDURE — 82947 ASSAY GLUCOSE BLOOD QUANT: CPT | Performed by: INTERNAL MEDICINE

## 2022-04-12 PROCEDURE — 99214 OFFICE O/P EST MOD 30 MIN: CPT | Performed by: INTERNAL MEDICINE

## 2022-04-12 PROCEDURE — 3044F HG A1C LEVEL LT 7.0%: CPT | Performed by: INTERNAL MEDICINE

## 2022-04-12 PROCEDURE — 83036 HEMOGLOBIN GLYCOSYLATED A1C: CPT | Performed by: INTERNAL MEDICINE

## 2022-04-12 RX ORDER — LISINOPRIL 5 MG/1
5 TABLET ORAL DAILY
Qty: 30 TABLET | Refills: 6 | Status: SHIPPED | OUTPATIENT
Start: 2022-04-12 | End: 2022-07-28 | Stop reason: SDUPTHER

## 2022-04-12 NOTE — PROGRESS NOTES
"Chief complaint  Diabetes, Hypothyroidism, and Addisons disease (Follow UP:  Pt upset, crying. ( Could we referrer her to [facility]) )    Subjective   Karen Rubio is a 66 y.o. female is here today for follow-up.  Follow-up for hypothyroidism and Shawn's disease. HTN and Depression. Diabetes.   Adrenal insufficiency / Shawn's disease dx in 2004. She had significant hyperpigmentation and fatigue. She was taking hydrocortisone and fludrocortisone.                                                                                                                      Diabetes mellitus type 2   On Januvia 100  mg daily.     Hypothyroidism postablative. S/p I-131 therapy at age 14. She is taking levothyroxine 50 mcg.       HTN - BP is normal.   Patient reported that her social situation is still difficult. She has no car and doesn't have resources to get to the pharmacy or groceries.     She has severe deforming arthritis. Has worsening of the condition. She has severe back pain.      Medications.    Current Outpatient Medications:   •  albuterol sulfate  (90 Base) MCG/ACT inhaler, Inhale 2 puffs Every 4 (Four) Hours As Needed for Wheezing., Disp: 1 g, Rfl: 1  •  ALPRAZolam (XANAX) 1 MG tablet, Take 1 tablet by mouth 4 (Four) Times a Day., Disp: 28 tablet, Rfl: 0  •  B-D 3CC LUER-JELLY SYR 25GX1\" 25G X 1\" 3 ML misc, AS DIRECTED WITH B12 INJECTIONS, Disp: 4 each, Rfl: 3  •  Belbuca 75 MCG film, , Disp: , Rfl:   •  carboxymethylcellulose (Lubricant Eye Drops) 0.5 % solution, Administer 1 drop to both eyes 3 (Three) Times a Day As Needed for Dry Eyes., Disp: 30 mL, Rfl: 11  •  cetirizine (zyrTEC) 10 MG tablet, TAKE ONE TABLET BY MOUTH EVERY DAY, Disp: 30 tablet, Rfl: 6  •  cyanocobalamin ([employer]S Vitamin B-12) 1000 MCG tablet, Take 1 tablet by mouth Daily., Disp: 90 tablet, Rfl: 3  •  cyanocobalamin 1000 MCG/ML injection, INJECT 1ML INTRAMUSCULARLY EVERY 2 WEEKS AS DIRECTED, Disp: 2 mL, Rfl: 3  •  " diclofenac (VOLTAREN) 75 MG EC tablet, , Disp: , Rfl:   •  fluticasone (FLONASE) 50 MCG/ACT nasal spray, 1 spray into the nostril(s) as directed by provider Daily., Disp: 1 g, Rfl: 2  •  gabapentin (NEURONTIN) 300 MG capsule, , Disp: , Rfl:   •  glucose blood test strip, Test BG TID, Disp: 300 each, Rfl: 3  •  glucose monitor monitoring kit, Test BG TID, Disp: 1 each, Rfl: 0  •  hydrocortisone (CORTEF) 5 MG tablet, TAKE 4 TABLETS BY MOUTH EVERY DAY IN THE MORNING AND ALSO TAKE 1 TABLET EVERY DAY AT 3 PM (TAKE DOUBLE DOSE IN THE EVENT OF ILLNESS), Disp: 200 tablet, Rfl: 3  •  lactulose (CHRONULAC) 10 GM/15ML solution, , Disp: , Rfl:   •  Lancets misc, Test BG TID, Disp: 300 each, Rfl: 3  •  levothyroxine (SYNTHROID, LEVOTHROID) 50 MCG tablet, Take 1 tablet by mouth Daily., Disp: 90 tablet, Rfl: 1  •  lisinopril (PRINIVIL,ZESTRIL) 5 MG tablet, Take 1 tablet by mouth Daily. For BP, Disp: 30 tablet, Rfl: 6  •  montelukast (SINGULAIR) 10 MG tablet, Take 1 tablet by mouth every night at bedtime., Disp: 90 tablet, Rfl: 1  •  ondansetron ODT (ZOFRAN ODT) 8 MG disintegrating tablet, Take 1 tablet by mouth Every 8 (Eight) Hours As Needed for Nausea or Vomiting., Disp: 15 tablet, Rfl: 2  •  oxyCODONE-acetaminophen (PERCOCET)  MG per tablet, Take 1 tablet by mouth Every 4 (Four) Hours As Needed for Moderate Pain ., Disp: 150 tablet, Rfl: 0  •  simvastatin (ZOCOR) 20 MG tablet, TAKE ONE TABLET BY MOUTH EVERY NIGHT, Disp: 90 tablet, Rfl: 2  •  SITagliptin (Januvia) 100 MG tablet, Take 1 tablet by mouth Daily., Disp: 90 tablet, Rfl: 1  •  vitamin D (ERGOCALCIFEROL) 1.25 MG (03566 UT) capsule capsule, TAKE 1 CAPSULE BY MOUTH ONE TIME PER WEEK, Disp: 12 capsule, Rfl: 1    Current Facility-Administered Medications:   •  cyanocobalamin injection 1,000 mcg, 1,000 mcg, Intramuscular, Q28 Days, Aleah Morales MD, 1,000 mcg at 12/14/21 1629      Review of systems  Review of Systems   Constitutional: Positive for appetite change,  "diaphoresis and fatigue.   Eyes: Positive for visual disturbance.   Musculoskeletal: Positive for arthralgias, back pain and gait problem.   Neurological: Positive for dizziness, weakness and headaches.   Psychiatric/Behavioral: Positive for confusion and sleep disturbance. The patient is nervous/anxious.        Physical exam  Objective   Blood pressure 136/88, pulse 98, height 132.1 cm (52\"), weight 56.1 kg (123 lb 9.6 oz), SpO2 98 %, not currently breastfeeding.   Physical Exam   Constitutional: She is oriented to person, place, and time. She appears well-developed.   HENT:   Head: Normocephalic and atraumatic.   Eyes: Conjunctivae are normal.   Neck: No thyromegaly present.   Cardiovascular: Normal rate, regular rhythm, normal heart sounds and normal pulses.   Pulmonary/Chest: Effort normal and breath sounds normal.   Musculoskeletal: Deformity present.   Neurological: She is alert and oriented to person, place, and time.   Psychiatric: Thought content normal.   Vitals reviewed.        LABS AND IMAGING  Office Visit on 04/12/2022   Component Date Value Ref Range Status   • Glucose 04/12/2022 200 (A) 70 - 130 mg/dL Final   • Lot Number 04/12/2022 [id number]   Final   • Expiration Date 04/12/2022 07/14/2022   Final   • Hemoglobin A1C 04/12/2022 6.3  % Final   • Lot Number 04/12/2022 [id number]   Final   • Expiration Date 04/12/2022 01/07/2024   Final           Assessment  Assessment/Plan   1. Controlled type 2 diabetes mellitus without complication, without long-term current use of insulin ([facility])    2. Postablative hypothyroidism    3. [place]'s disease (HCC)    4. Essential hypertension    5. Mixed hyperlipidemia    6. Vitamin D deficiency    7. Cobalamin deficiency      Orders Placed This Encounter   Procedures   • POC Glucose, Blood   • POC Glycosylated Hemoglobin (Hb A1C)     Plan    -Hydrocortisone dose: 20 mg in am and 5 mg in pm.  It appears that she is doing well without fludrocortisone and her BP is " normal.    -HTN - lisinopril 5 mg daily.          - DM 2 - Continue Januvia 100 mg daily,          -continue Levothyroxine 50 mcg a day. Thyroid function was normal on last labs.     - check vitamin d and continue Vit D 1000 IU daily.     B12 administered a week ago. Her levels were low in the past    Follow-up in 4 months

## 2022-04-12 NOTE — PATIENT INSTRUCTIONS
Karen Rubio  is a patient of mine with multiple medical programs, including adrenal problem, thyroid problem, severe depression and diabetes.   She would greatly benefit from having a pet for emotional support.   Please allow her to obtain a pet.     If you have any questions, please call to our office.     Sincerely,   Aleah Morales MD

## 2022-04-18 ENCOUNTER — TELEPHONE (OUTPATIENT)
Dept: ENDOCRINOLOGY | Facility: CLINIC | Age: 67
End: 2022-04-18

## 2022-04-18 NOTE — TELEPHONE ENCOUNTER
Returned pt call, she stated that someone was suppose to fax a paper for Dr frederick to fill out/sign for her to get her puppy. I advised that we have not received the form, nor anything in her chart that states that we have.  Pt will  who was to have sent the form

## 2022-04-26 ENCOUNTER — PATIENT OUTREACH (OUTPATIENT)
Dept: CASE MANAGEMENT | Facility: OTHER | Age: 67
End: 2022-04-26

## 2022-04-26 NOTE — OUTREACH NOTE
Patient Outreach    SW contacted to discuss resources. Pt reports that she is waiting for a downstairs apartment because she is having difficulty getting up and down her steps. Pt then requested that SW call her back tomorrow as she is at the doctor with a friend. SW will f/u tomorrow.    SHANNON STERN -   Ambulatory Case Management    4/26/2022, 14:01 EDT

## 2022-04-29 ENCOUNTER — PATIENT OUTREACH (OUTPATIENT)
Dept: CASE MANAGEMENT | Facility: OTHER | Age: 67
End: 2022-04-29

## 2022-04-29 NOTE — OUTREACH NOTE
SDOH updated and reviewed with the patient during this program:  Continuing Care   [facility]    [address], [facility] [address]    Phone: [phone]    Resource for: Food Insecurity   [facility]    [address] [place], [facility] [address]    Phone: [phone]    Resource for: Intimate Partner Violence   [facility]    [address], [facility] [address]    Phone: [phone]    Resource for: Intimate Partner Violence       Patient Outreach    SW contacted pt to discuss ongoing needs. Pt reports that she would like to speak to a therapist but would prefer not to use telehealth. SW provided information on services in her community. Pt also reports the need for additional supports in her home and SW provided education on medicaid waivers. Pt agreed to SW sending referral to [employer]. Pt reports that she is still waiting for a downstairs apartment but did not want SW to contact [facility].    Care Coordination    SW submitted referral to [facility]

## 2022-05-06 ENCOUNTER — TELEPHONE (OUTPATIENT)
Dept: ENDOCRINOLOGY | Facility: CLINIC | Age: 67
End: 2022-05-06

## 2022-05-06 RX ORDER — ALENDRONATE SODIUM 70 MG/1
70 TABLET ORAL
Qty: 4 TABLET | Refills: 11 | Status: SHIPPED | OUTPATIENT
Start: 2022-05-06 | End: 2022-08-30

## 2022-05-06 NOTE — TELEPHONE ENCOUNTER
----- Message from Aleah THOMAS MD sent at 5/5/2022  6:02 PM EDT -----  Fosamax 70 mg once a week. Please send rx   ----- Message -----  From: Elvira Ayala MA  Sent: 4/26/2022   9:32 AM EDT  To: Aleah THOMAS MD    Pt notified of results via Letter  Please advise on dosage of Fosamax

## 2022-05-09 ENCOUNTER — PATIENT OUTREACH (OUTPATIENT)
Dept: CASE MANAGEMENT | Facility: OTHER | Age: 67
End: 2022-05-09

## 2022-05-09 NOTE — OUTREACH NOTE
Patient Outreach    SW contacted pt to discuss resources from last week. In the past week, a family member  and pt is currently on the way to talk to a corner about financial concerns regarding cremation. SW provided emotional support and will follow up with pt at a later time.     SHANNON STERN -   Ambulatory Case Management    2022, 14:13 EDT

## 2022-05-20 ENCOUNTER — PATIENT OUTREACH (OUTPATIENT)
Dept: CASE MANAGEMENT | Facility: OTHER | Age: 67
End: 2022-05-20

## 2022-05-20 NOTE — OUTREACH NOTE
Care Coordination    SW received a follow up call from [facility] reporting that pt already has a medicaid waiver and that pt should either contact  Geena or contacted home and community based waiver help desk at [phone].    Patient Outreach    SW contacted pt to provide update. Pt reports her foot is broken and she is in the hospital and requests that SW call back later.    SHANNON STERN -   Ambulatory Case Management    5/20/2022, 15:09 EDT

## 2022-05-27 ENCOUNTER — PATIENT OUTREACH (OUTPATIENT)
Dept: CASE MANAGEMENT | Facility: OTHER | Age: 67
End: 2022-05-27

## 2022-05-27 NOTE — OUTREACH NOTE
Patient Outreach    SW contacted pt to let her know that she needs to contact her medicaid waiver program for more help  [phone]. Pt states she can not write down the phone number and does not have an email. She requests that sW call her another day.     SHANNON STERN -   Ambulatory Case Management    5/27/2022, 15:45 EDT

## 2022-06-06 ENCOUNTER — PATIENT OUTREACH (OUTPATIENT)
Dept: CASE MANAGEMENT | Facility: OTHER | Age: 67
End: 2022-06-06

## 2022-06-06 NOTE — OUTREACH NOTE
Patient Outreach    SW contacted pt to review that she would need to contact the Medicaid Waiver Helpdesk to request more help as she already has a waiver and a case manger. SW provided pt with contact information. Pt states that she will call. SW will continue to follow to ensure she was able to contact them and assess for additional assistance needed.    SHANNON STERN -   Ambulatory Case Management    6/6/2022, 11:25 EDT

## 2022-06-08 RX ORDER — CYANOCOBALAMIN 1000 UG/ML
INJECTION, SOLUTION INTRAMUSCULAR; SUBCUTANEOUS
Qty: 1 ML | Refills: 1 | Status: SHIPPED | OUTPATIENT
Start: 2022-06-08 | End: 2022-08-31

## 2022-06-10 ENCOUNTER — TELEPHONE (OUTPATIENT)
Dept: ENDOCRINOLOGY | Facility: CLINIC | Age: 67
End: 2022-06-10

## 2022-06-10 NOTE — TELEPHONE ENCOUNTER
"Attempted return call. \"VM full and cannot accept calls at this time\" PT has refills on all her medications.  Some are for 90 days with refills.  She should not be out. She should contact pharmacy   "

## 2022-06-10 NOTE — TELEPHONE ENCOUNTER
PT CALLED REQUESTING JÚNIOR DISEASE INJECTION (ONCE A MONTH INJECTION) TO BE SENT IN TO [facility] IN [place]    SHE ALSO REQUESTED STEROIDS AND BP MEDS TO BE SENT IN TO [facility].    PT THEN STATED SHE NEEDS ALL OF HER MEDS SENT IN, AND THAT SHE IS OUT OF SOME.

## 2022-06-16 ENCOUNTER — PATIENT OUTREACH (OUTPATIENT)
Dept: CASE MANAGEMENT | Facility: OTHER | Age: 67
End: 2022-06-16

## 2022-06-16 NOTE — OUTREACH NOTE
Patient Outreach    SW contacted pt to discuss needs. Pt states no one will help her. She states that her landlord will not move her to a first floor apartment. She does not want SW to contact landlord. She states that she could get another apartment, but cannot move. Pt states that she does not have anyone to help her move and that she cannot afford to hire help. Unfortunately, SW is unaware of any resources that can assist with moving. Pt has not contacted medicaid waiver program.  Care Coordination    SW contacted medicaid wiaver helpdesk to inquire about . Pts  is Donna Baker [phone]. SW left message requesting callback.     SHANNON STERN -   Ambulatory Case Management    6/16/2022, 14:37 EDT

## 2022-06-20 ENCOUNTER — PATIENT OUTREACH (OUTPATIENT)
Dept: CASE MANAGEMENT | Facility: OTHER | Age: 67
End: 2022-06-20

## 2022-06-20 NOTE — OUTREACH NOTE
Patient Outreach    SW received a return call from Medicaid Waiver  Geena Baker. Geena states that pt does qualify for 30 hours of assistance in the home, including help with instrumental activities of daily living and transportation to appointments. PT had a caregiver but pt requested a new one and since then, staffing has made it difficult to find a caregiver for one. Geena is hopeful that they have found one that will be able to work to her but if not, will make additional referrals. Geena is planning on calling pt today. SW will follow up with pt to make sure she has the information on another day.     SHANNON STERN -   Ambulatory Case Management    6/20/2022, 09:32 EDT

## 2022-07-01 ENCOUNTER — PATIENT OUTREACH (OUTPATIENT)
Dept: CASE MANAGEMENT | Facility: OTHER | Age: 67
End: 2022-07-01

## 2022-07-01 NOTE — OUTREACH NOTE
Patient Outreach    SW contacted pt to discuss resources. Pt reports that she is still having trouble around the home. It is very difficult for her to get to the laundry mat and grocery shopping is difficult. SW reviewed that pt does have a waiver and that her  is still working to find a care-giver for her. No new needs expressed, so SW will close referral at this time. SW encouraged pt to contact SW if any additional needs arise.    SHANNON STERN -   Ambulatory Case Management    7/1/2022, 11:33 EDT

## 2022-07-25 ENCOUNTER — TELEPHONE (OUTPATIENT)
Dept: ENDOCRINOLOGY | Facility: CLINIC | Age: 67
End: 2022-07-25

## 2022-07-25 NOTE — TELEPHONE ENCOUNTER
Pt needs all of her medications refilled     Pt uses [facility] pharmacy     Please call the pt if you have questions

## 2022-07-28 DIAGNOSIS — E27.1 ADDISON'S DISEASE: Chronic | ICD-10-CM

## 2022-07-28 DIAGNOSIS — E55.9 VITAMIN D DEFICIENCY: ICD-10-CM

## 2022-07-28 DIAGNOSIS — J30.2 SEASONAL ALLERGIC RHINITIS: ICD-10-CM

## 2022-07-29 RX ORDER — HYDROCORTISONE 5 MG/1
TABLET ORAL
Qty: 200 TABLET | Refills: 1 | Status: SHIPPED | OUTPATIENT
Start: 2022-07-29 | End: 2022-09-13 | Stop reason: SDUPTHER

## 2022-07-29 RX ORDER — SIMVASTATIN 20 MG
20 TABLET ORAL
Qty: 90 TABLET | Refills: 1 | Status: SHIPPED | OUTPATIENT
Start: 2022-07-29 | End: 2023-01-01 | Stop reason: SDUPTHER

## 2022-07-29 RX ORDER — LISINOPRIL 5 MG/1
5 TABLET ORAL DAILY
Qty: 30 TABLET | Refills: 5 | Status: SHIPPED | OUTPATIENT
Start: 2022-07-29 | End: 2022-10-04

## 2022-07-29 RX ORDER — MONTELUKAST SODIUM 10 MG/1
10 TABLET ORAL
Qty: 90 TABLET | Refills: 1 | Status: SHIPPED | OUTPATIENT
Start: 2022-07-29

## 2022-07-29 RX ORDER — ERGOCALCIFEROL 1.25 MG/1
CAPSULE ORAL
Qty: 12 CAPSULE | Refills: 1 | Status: SHIPPED | OUTPATIENT
Start: 2022-07-29 | End: 2023-01-01 | Stop reason: SDUPTHER

## 2022-08-17 ENCOUNTER — TELEPHONE (OUTPATIENT)
Dept: ENDOCRINOLOGY | Facility: CLINIC | Age: 67
End: 2022-08-17

## 2022-08-22 ENCOUNTER — LAB (OUTPATIENT)
Dept: LAB | Facility: HOSPITAL | Age: 67
End: 2022-08-22

## 2022-08-22 ENCOUNTER — OFFICE VISIT (OUTPATIENT)
Dept: ENDOCRINOLOGY | Facility: CLINIC | Age: 67
End: 2022-08-22

## 2022-08-22 VITALS
OXYGEN SATURATION: 97 % | DIASTOLIC BLOOD PRESSURE: 82 MMHG | BODY MASS INDEX: 28.93 KG/M2 | WEIGHT: 125 LBS | HEART RATE: 74 BPM | HEIGHT: 55 IN | SYSTOLIC BLOOD PRESSURE: 132 MMHG

## 2022-08-22 DIAGNOSIS — M81.0 OSTEOPOROSIS, UNSPECIFIED OSTEOPOROSIS TYPE, UNSPECIFIED PATHOLOGICAL FRACTURE PRESENCE: ICD-10-CM

## 2022-08-22 DIAGNOSIS — E11.9 CONTROLLED TYPE 2 DIABETES MELLITUS WITHOUT COMPLICATION, WITHOUT LONG-TERM CURRENT USE OF INSULIN: Primary | ICD-10-CM

## 2022-08-22 DIAGNOSIS — E89.0 POSTABLATIVE HYPOTHYROIDISM: Chronic | ICD-10-CM

## 2022-08-22 DIAGNOSIS — I10 ESSENTIAL HYPERTENSION: Chronic | ICD-10-CM

## 2022-08-22 DIAGNOSIS — E27.1 ADDISON'S DISEASE: Chronic | ICD-10-CM

## 2022-08-22 DIAGNOSIS — E78.2 MIXED HYPERLIPIDEMIA: ICD-10-CM

## 2022-08-22 LAB
EXPIRATION DATE: ABNORMAL
EXPIRATION DATE: NORMAL
GLUCOSE BLDC GLUCOMTR-MCNC: 214 MG/DL (ref 70–130)
HBA1C MFR BLD: 6.6 %
Lab: ABNORMAL
Lab: NORMAL

## 2022-08-22 PROCEDURE — 84443 ASSAY THYROID STIM HORMONE: CPT | Performed by: INTERNAL MEDICINE

## 2022-08-22 PROCEDURE — 84439 ASSAY OF FREE THYROXINE: CPT | Performed by: INTERNAL MEDICINE

## 2022-08-22 PROCEDURE — 83036 HEMOGLOBIN GLYCOSYLATED A1C: CPT | Performed by: INTERNAL MEDICINE

## 2022-08-22 PROCEDURE — 82947 ASSAY GLUCOSE BLOOD QUANT: CPT | Performed by: INTERNAL MEDICINE

## 2022-08-22 PROCEDURE — 3044F HG A1C LEVEL LT 7.0%: CPT | Performed by: INTERNAL MEDICINE

## 2022-08-22 PROCEDURE — 80053 COMPREHEN METABOLIC PANEL: CPT | Performed by: INTERNAL MEDICINE

## 2022-08-22 PROCEDURE — 99214 OFFICE O/P EST MOD 30 MIN: CPT | Performed by: INTERNAL MEDICINE

## 2022-08-22 RX ORDER — BLOOD-GLUCOSE METER
KIT MISCELLANEOUS
Qty: 1 EACH | Refills: 0 | Status: SHIPPED | OUTPATIENT
Start: 2022-08-22

## 2022-08-22 RX ORDER — LANCETS 30 GAUGE
EACH MISCELLANEOUS
Qty: 300 EACH | Refills: 3 | Status: SHIPPED | OUTPATIENT
Start: 2022-08-22

## 2022-08-22 NOTE — PROGRESS NOTES
"Chief complaint  Diabetes and Hypothyroidism (Follow up)    Subjective   Karen Rubio is a 66 y.o. female is here today for follow-up.  Follow-up for Hypothyroidism and Hialeah's disease. HTN and Depression. Diabetes.   Adrenal insufficiency / Hialeah's disease dx in 2004. She had significant hyperpigmentation and fatigue. She was taking hydrocortisone and fludrocortisone.                                                                                                                      Diabetes mellitus type 2   On Januvia 100  mg daily.     Hypothyroidism postablative. S/p I-131 therapy at age 14. She is taking levothyroxine 50 mcg.       HTN - BP is normal.   Patient reported that her social situation is still difficult. She has no car and doesn't have resources to get to the pharmacy or groceries.     She has severe deforming arthritis and pain . Has worsening of the condition. She has severe back pain. She has high stress and lots of financial issues.      Medications.    Current Outpatient Medications:   •  albuterol sulfate  (90 Base) MCG/ACT inhaler, Inhale 2 puffs Every 4 (Four) Hours As Needed for Wheezing., Disp: 1 g, Rfl: 1  •  alendronate (Fosamax) 70 MG tablet, Take 1 tablet by mouth Every 7 (Seven) Days., Disp: 4 tablet, Rfl: 11  •  ALPRAZolam (XANAX) 1 MG tablet, Take 1 tablet by mouth 4 (Four) Times a Day., Disp: 28 tablet, Rfl: 0  •  B-D 3CC LUER-JELLY SYR 25GX1\" 25G X 1\" 3 ML misc, AS DIRECTED WITH B12 INJECTIONS, Disp: 4 each, Rfl: 3  •  Belbuca 75 MCG film, , Disp: , Rfl:   •  carboxymethylcellulose (Lubricant Eye Drops) 0.5 % solution, Administer 1 drop to both eyes 3 (Three) Times a Day As Needed for Dry Eyes., Disp: 30 mL, Rfl: 11  •  cetirizine (zyrTEC) 10 MG tablet, TAKE ONE TABLET BY MOUTH EVERY DAY, Disp: 30 tablet, Rfl: 6  •  cyanocobalamin ([employer] Vitamin B-12) 1000 MCG tablet, Take 1 tablet by mouth Daily., Disp: 90 tablet, Rfl: 3  •  cyanocobalamin 1000 MCG/ML injection, INJECT " 1 ML INTRAMUSCULARLY EVERY 2 WEEKS AS DIRECTED, Disp: 1 mL, Rfl: 1  •  diclofenac (VOLTAREN) 75 MG EC tablet, , Disp: , Rfl:   •  fluticasone (FLONASE) 50 MCG/ACT nasal spray, 1 spray into the nostril(s) as directed by provider Daily., Disp: 1 g, Rfl: 2  •  gabapentin (NEURONTIN) 300 MG capsule, , Disp: , Rfl:   •  glucose blood test strip, Test BG TID, Disp: 300 each, Rfl: 3  •  glucose monitor monitoring kit, Test BG TID, Disp: 1 each, Rfl: 0  •  hydrocortisone (CORTEF) 5 MG tablet, TAKE 4 TABLETS BY MOUTH EVERY DAY IN THE MORNING AND ALSO TAKE 1 TABLET EVERY DAY AT 3 PM (TAKE DOUBLE DOSE IN THE EVENT OF ILLNESS), Disp: 200 tablet, Rfl: 1  •  lactulose (CHRONULAC) 10 GM/15ML solution, , Disp: , Rfl:   •  Lancets misc, Test BG TID, Disp: 300 each, Rfl: 3  •  levothyroxine (SYNTHROID, LEVOTHROID) 50 MCG tablet, Take 1 tablet by mouth Daily., Disp: 90 tablet, Rfl: 1  •  lisinopril (PRINIVIL,ZESTRIL) 5 MG tablet, Take 1 tablet by mouth Daily. For BP, Disp: 30 tablet, Rfl: 5  •  montelukast (SINGULAIR) 10 MG tablet, Take 1 tablet by mouth every night at bedtime., Disp: 90 tablet, Rfl: 1  •  ondansetron ODT (ZOFRAN ODT) 8 MG disintegrating tablet, Take 1 tablet by mouth Every 8 (Eight) Hours As Needed for Nausea or Vomiting., Disp: 15 tablet, Rfl: 2  •  oxyCODONE-acetaminophen (PERCOCET)  MG per tablet, Take 1 tablet by mouth Every 4 (Four) Hours As Needed for Moderate Pain ., Disp: 150 tablet, Rfl: 0  •  simvastatin (ZOCOR) 20 MG tablet, Take 1 tablet by mouth every night at bedtime., Disp: 90 tablet, Rfl: 1  •  SITagliptin (Januvia) 100 MG tablet, Take 1 tablet by mouth Daily., Disp: 90 tablet, Rfl: 1  •  vitamin D (ERGOCALCIFEROL) 1.25 MG (43159 UT) capsule capsule, TAKE 1 CAPSULE BY MOUTH ONE TIME PER WEEK, Disp: 12 capsule, Rfl: 1    Current Facility-Administered Medications:   •  cyanocobalamin injection 1,000 mcg, 1,000 mcg, Intramuscular, Q28 Days, Aleah Morales MD, 1,000 mcg at 12/14/21 9461      Review  "of systems  Review of Systems   Constitutional: Positive for appetite change, diaphoresis and fatigue.   Eyes: Positive for visual disturbance.   Musculoskeletal: Positive for arthralgias, back pain and gait problem.   Neurological: Positive for dizziness, weakness and headaches.   Psychiatric/Behavioral: Positive for confusion and sleep disturbance. The patient is nervous/anxious.        Physical exam  Objective   /82   Pulse 74   Ht 132.1 cm (52\")   Wt 56.7 kg (125 lb)   SpO2 97%   BMI 32.50 kg/m²   Physical Exam   Constitutional: She is oriented to person, place, and time. She appears well-developed.   HENT:   Head: Normocephalic and atraumatic.   Eyes: Conjunctivae are normal.   Neck: No thyromegaly present.   Cardiovascular: Normal rate, regular rhythm, normal heart sounds and normal pulses.   Pulmonary/Chest: Effort normal and breath sounds normal.   Musculoskeletal: Deformity present.   Neurological: She is alert and oriented to person, place, and time.   Psychiatric: Thought content normal.   Vitals reviewed.        LABS AND IMAGING  Office Visit on 08/22/2022   Component Date Value Ref Range Status   • Hemoglobin A1C 08/22/2022 6.6  % Final   • Lot Number 08/22/2022 [id number]   Final   • Expiration Date 08/22/2022 04/03/2024   Final   • Glucose 08/22/2022 214 (A) 70 - 130 mg/dL Final   • Lot Number 08/22/2022 [id number]   Final   • Expiration Date 08/22/2022 02/26/2023   Final           Assessment  Assessment & Plan   1. Controlled type 2 diabetes mellitus without complication, without long-term current use of insulin ([facility])    2. Shanw's disease (HCC)    3. Postablative hypothyroidism    4. Essential hypertension    5. Mixed hyperlipidemia    6. Osteoporosis, unspecified osteoporosis type, unspecified pathological fracture presence      Orders Placed This Encounter   Procedures   • TSH   • T4, Free   • Comprehensive Metabolic Panel   • POC Glycosylated Hemoglobin (Hb A1C)   • POC Glucose, Blood "     Plan    -Hydrocortisone dose: 20 mg in am and 5 mg in pm.  It appears that she is doing well without fludrocortisone and her BP is normal.    -HTN - lisinopril 5 mg daily. BP is normal          - DM 2 - Continue Januvia 100 mg daily,          -continue Levothyroxine 50 mcg a day. Thyroid function was normal on last labs.     -cont weekly alendronate and high Vit D     Labs today   Follow-up in 4 months

## 2022-08-23 ENCOUNTER — TELEPHONE (OUTPATIENT)
Dept: ENDOCRINOLOGY | Facility: CLINIC | Age: 67
End: 2022-08-23

## 2022-08-23 LAB
ALBUMIN SERPL-MCNC: 4.5 G/DL (ref 3.5–5.2)
ALBUMIN/GLOB SERPL: 2.3 G/DL
ALP SERPL-CCNC: 82 U/L (ref 39–117)
ALT SERPL W P-5'-P-CCNC: 16 U/L (ref 1–33)
ANION GAP SERPL CALCULATED.3IONS-SCNC: 12 MMOL/L (ref 5–15)
AST SERPL-CCNC: 29 U/L (ref 1–32)
BILIRUB SERPL-MCNC: 0.6 MG/DL (ref 0–1.2)
BUN SERPL-MCNC: 13 MG/DL (ref 8–23)
BUN/CREAT SERPL: 14.1 (ref 7–25)
CALCIUM SPEC-SCNC: 9.5 MG/DL (ref 8.6–10.5)
CHLORIDE SERPL-SCNC: 104 MMOL/L (ref 98–107)
CO2 SERPL-SCNC: 24 MMOL/L (ref 22–29)
CREAT SERPL-MCNC: 0.92 MG/DL (ref 0.57–1)
EGFRCR SERPLBLD CKD-EPI 2021: 68.4 ML/MIN/1.73
GLOBULIN UR ELPH-MCNC: 2 GM/DL
GLUCOSE SERPL-MCNC: 227 MG/DL (ref 65–99)
POTASSIUM SERPL-SCNC: 5 MMOL/L (ref 3.5–5.2)
PROT SERPL-MCNC: 6.5 G/DL (ref 6–8.5)
SODIUM SERPL-SCNC: 140 MMOL/L (ref 136–145)
T4 FREE SERPL-MCNC: 0.97 NG/DL (ref 0.93–1.7)
TSH SERPL DL<=0.05 MIU/L-ACNC: 1.17 UIU/ML (ref 0.27–4.2)

## 2022-08-23 NOTE — TELEPHONE ENCOUNTER
Contacted Pharmacy left a detailed message on VM advising that PT stated they would pre package her mediations if we requested it.  Advised to contact if they have any questions.

## 2022-08-23 NOTE — TELEPHONE ENCOUNTER
----- Message from Aleah THOMAS MD sent at 8/22/2022  3:56 PM EDT -----  Please call the pharmacy and request weekly medication organized. Patient has memory issues and forget to take medications. I would like the pharmacy to prepackage and provide weekly packages.

## 2022-08-30 RX ORDER — ALENDRONATE SODIUM 70 MG/1
70 TABLET ORAL
Qty: 12 TABLET | Refills: 3 | Status: SHIPPED | OUTPATIENT
Start: 2022-08-30 | End: 2023-08-30

## 2022-08-30 NOTE — TELEPHONE ENCOUNTER
FAX from [facility] requesting Aldendrate to be  90 day supply  Will resend as Rx was written for 30 day

## 2022-08-31 RX ORDER — CYANOCOBALAMIN 1000 UG/ML
INJECTION, SOLUTION INTRAMUSCULAR; SUBCUTANEOUS
Qty: 1 ML | Refills: 1 | Status: SHIPPED | OUTPATIENT
Start: 2022-08-31 | End: 2023-04-06

## 2022-09-06 RX ORDER — LISINOPRIL 10 MG/1
TABLET ORAL
Qty: 90 TABLET | Refills: 0 | OUTPATIENT
Start: 2022-09-06

## 2022-09-06 NOTE — TELEPHONE ENCOUNTER
RX was change to 5mg  lisinopril (PRINIVIL,ZESTRIL) 5 MG tablet [[id number]]     Order Details  Dose: 5 mg Route: Oral Frequency: Daily   Dispense Quantity: 30 tablet Refills: 5          Sig: Take 1 tablet by mouth Daily. For BP         Start Date: 07/29/22 End Date: --   Written Date: 07/29/22 Expiration Date: 07/29/23   Original Order:  lisinopril (PRINIVIL,ZESTRIL) 5 MG tablet [[id number]]     Providers    Ordering Provider and Authorizing Provider:    Aleah Morales MD   [address] [facility] [address]   Phone:  [phone]   Fax:  [phone]   NPI:  [id number]        Ordering User:  Aleah Morales MD          Pharmacy    [facility], [place], [place] - [address] [place] [address] - [phone]  - [phone] [address] [facility] [address]   Phone:  [phone]  Fax:  [phone]         Lab Test Results    Component Date/Time Value Range & Unit Status   Creatinine 08/22/22 1604 0.92 0.57 - 1.00 mg/dL Final   Potassium 08/22/22 1604 5.0 3.5 - 5.2 mmol/L Final                         Warnings History    No Interaction Warnings Shown              Pharmacist Clinical Review History    This prescription has not been clinically reviewed.       Order Reconciliation Actions       Order Reconciliation Actions                 E-Prescribing Status      Outpatient Medication Detail    lisinopril (PRINIVIL,ZESTRIL) 5 MG tablet        Sig: Take 1 tablet by mouth Daily. For BP        Sent to pharmacy as: Lisinopril 5 MG Oral Tablet (PRINIVIL,ZESTRIL)        Class: Normal        Route: Oral        E-Prescribing Status: Receipt confirmed by pharmacy (7/29/2022 10:02 AM EDT)

## 2022-09-13 DIAGNOSIS — E27.1 ADDISON'S DISEASE: Chronic | ICD-10-CM

## 2022-09-13 RX ORDER — HYDROCORTISONE 5 MG/1
TABLET ORAL
Qty: 200 TABLET | Refills: 1 | Status: SHIPPED | OUTPATIENT
Start: 2022-09-13 | End: 2023-01-01 | Stop reason: SDUPTHER

## 2022-09-13 NOTE — TELEPHONE ENCOUNTER
Pt called requesting a prescription for Hydrocortisone 5 mg. Pt last f/u 08/22/22 pt next f/u 01/26/23

## 2022-09-23 RX ORDER — ALBUTEROL SULFATE 90 UG/1
2 AEROSOL, METERED RESPIRATORY (INHALATION) EVERY 4 HOURS PRN
Qty: 1 G | Refills: 1 | Status: SHIPPED | OUTPATIENT
Start: 2022-09-23

## 2022-10-03 NOTE — TELEPHONE ENCOUNTER
5mg is listed in her chart and on med list.  Need to confirm dosage was not increased       Plan     -Hydrocortisone dose: 20 mg in am and 5 mg in pm.  It appears that she is doing well without fludrocortisone and her BP is normal.     -HTN - lisinopril 5 mg daily. BP is normal

## 2022-10-04 RX ORDER — LISINOPRIL 5 MG/1
5 TABLET ORAL DAILY
Qty: 90 TABLET | Refills: 3 | Status: SHIPPED | OUTPATIENT
Start: 2022-10-04

## 2022-10-24 ENCOUNTER — TELEPHONE (OUTPATIENT)
Dept: ENDOCRINOLOGY | Facility: CLINIC | Age: 67
End: 2022-10-24

## 2022-10-24 NOTE — TELEPHONE ENCOUNTER
Pt notified recall on her Lisinopril, per Dr Morales VO contact pt to let her know and she is to contact the pharmacy about replacing the RX.

## 2022-12-07 ENCOUNTER — TELEPHONE (OUTPATIENT)
Dept: ENDOCRINOLOGY | Facility: CLINIC | Age: 67
End: 2022-12-07

## 2022-12-07 NOTE — TELEPHONE ENCOUNTER
Pt called to get in to see dr frederick. Dr frederick has a few 15 minute spots available in december    Pt has had deaths in the family and had to cancel previous appts      Pt has been very sick. 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    Please call to discuss care or make appt for this month. Pt has appt in January.      Call pt:  [phone]

## 2022-12-10 DIAGNOSIS — E27.1 ADDISON'S DISEASE: Chronic | ICD-10-CM

## 2022-12-12 ENCOUNTER — TELEPHONE (OUTPATIENT)
Dept: ENDOCRINOLOGY | Facility: CLINIC | Age: 67
End: 2022-12-12

## 2022-12-12 RX ORDER — HYDROCORTISONE 5 MG/1
TABLET ORAL
Qty: 200 TABLET | Refills: 0 | OUTPATIENT
Start: 2022-12-12

## 2022-12-13 NOTE — TELEPHONE ENCOUNTER
Attempted contact message states number dialed not in service.  Will wait for pt to call back  Unable to reach pt at numbers listed in her chart

## 2022-12-29 ENCOUNTER — OFFICE VISIT (OUTPATIENT)
Dept: ENDOCRINOLOGY | Facility: CLINIC | Age: 67
End: 2022-12-29
Payer: MEDICARE

## 2022-12-29 DIAGNOSIS — E11.9 TYPE 2 DIABETES MELLITUS WITHOUT COMPLICATION, WITHOUT LONG-TERM CURRENT USE OF INSULIN: ICD-10-CM

## 2022-12-29 DIAGNOSIS — E55.9 VITAMIN D DEFICIENCY: ICD-10-CM

## 2022-12-29 DIAGNOSIS — E89.0 POSTABLATIVE HYPOTHYROIDISM: Chronic | ICD-10-CM

## 2022-12-29 DIAGNOSIS — E27.1 ADDISON'S DISEASE: Chronic | ICD-10-CM

## 2022-12-29 PROCEDURE — 1159F MED LIST DOCD IN RCRD: CPT | Performed by: INTERNAL MEDICINE

## 2022-12-29 PROCEDURE — 1160F RVW MEDS BY RX/DR IN RCRD: CPT | Performed by: INTERNAL MEDICINE

## 2022-12-29 PROCEDURE — 99442 PR PHYS/QHP TELEPHONE EVALUATION 11-20 MIN: CPT | Performed by: INTERNAL MEDICINE

## 2022-12-29 NOTE — PROGRESS NOTES
You have chosen to receive care through a telephone visit. Do you consent to use a telephone visit for your medical care today? Yes    Chief complaint  Diabetes and Woods's Disease (Follow UP:  )    Subjective   Karen Rubio is a 67 y.o. female is here today for follow-up.  Follow-up for Hypothyroidism and Shawn's disease. HTN and Depression. Diabetes.   Adrenal insufficiency / Shawn's disease dx in 2004. She had significant hyperpigmentation and fatigue. She was taking hydrocortisone and fludrocortisone.                                                                                                                      Diabetes mellitus type 2   On Januvia 100  mg daily.     Hypothyroidism postablative. S/p I-131 therapy at age 14. She is taking levothyroxine 50 mcg.       HTN - BP is normal.   Patient reported that her social situation is still difficult. She has no car and doesn't have resources to get to the pharmacy or groceries. She has a very small support group and has a friend who helps her with getting groceries.     She has severe deforming arthritis and pain . Has worsening of the condition. She has severe back pain, depression. She is going to start seeing a therapist.      Medications.    Current Outpatient Medications:   •  albuterol sulfate  (90 Base) MCG/ACT inhaler, Inhale 2 puffs Every 4 (Four) Hours As Needed for Wheezing., Disp: 1 g, Rfl: 1  •  alendronate (Fosamax) 70 MG tablet, Take 1 tablet by mouth Every 7 (Seven) Days., Disp: 12 tablet, Rfl: 3  •  ALPRAZolam (XANAX) 1 MG tablet, Take 1 tablet by mouth 4 (Four) Times a Day., Disp: 28 tablet, Rfl: 0  •  B-D 3CC LUER-JELLY SYR 25GX1\" 25G X 1\" 3 ML misc, AS DIRECTED WITH B12 INJECTIONS, Disp: 4 each, Rfl: 3  •  Belbuca 75 MCG film, , Disp: , Rfl:   •  carboxymethylcellulose (Lubricant Eye Drops) 0.5 % solution, Administer 1 drop to both eyes 3 (Three) Times a Day As Needed for Dry Eyes., Disp: 30 mL, Rfl: 11  •  cetirizine  (zyrTEC) 10 MG tablet, TAKE ONE TABLET BY MOUTH EVERY DAY, Disp: 30 tablet, Rfl: 6  •  cyanocobalamin 1000 MCG/ML injection, INJECT 1 ML INTRAMUSCULARLY EVERY 2 WEEKS AS DIRECTED, Disp: 1 mL, Rfl: 1  •  diclofenac (VOLTAREN) 75 MG EC tablet, , Disp: , Rfl:   •  fluticasone (FLONASE) 50 MCG/ACT nasal spray, 1 spray into the nostril(s) as directed by provider Daily., Disp: 1 g, Rfl: 2  •  gabapentin (NEURONTIN) 300 MG capsule, , Disp: , Rfl:   •  glucose blood test strip, Test BG TID, Disp: 300 each, Rfl: 3  •  glucose monitor monitoring kit, Test BG TID, Disp: 1 each, Rfl: 0  •  hydrocortisone (CORTEF) 5 MG tablet, TAKE 4 TABLETS BY MOUTH EVERY DAY IN THE MORNING AND ALSO TAKE 1 TABLET EVERY DAY AT 3 PM (TAKE DOUBLE DOSE IN THE EVENT OF ILLNESS), Disp: 200 tablet, Rfl: 3  •  lactulose (CHRONULAC) 10 GM/15ML solution, , Disp: , Rfl:   •  Lancets misc, Test BG TID, Disp: 300 each, Rfl: 3  •  levothyroxine (SYNTHROID, LEVOTHROID) 50 MCG tablet, Take 1 tablet by mouth Daily., Disp: 90 tablet, Rfl: 3  •  lisinopril (PRINIVIL,ZESTRIL) 5 MG tablet, Take 1 tablet by mouth Daily. for blood pressure, Disp: 90 tablet, Rfl: 3  •  montelukast (SINGULAIR) 10 MG tablet, Take 1 tablet by mouth every night at bedtime., Disp: 90 tablet, Rfl: 1  •  ondansetron ODT (ZOFRAN ODT) 8 MG disintegrating tablet, Take 1 tablet by mouth Every 8 (Eight) Hours As Needed for Nausea or Vomiting., Disp: 15 tablet, Rfl: 2  •  oxyCODONE-acetaminophen (PERCOCET)  MG per tablet, Take 1 tablet by mouth Every 4 (Four) Hours As Needed for Moderate Pain ., Disp: 150 tablet, Rfl: 0  •  simvastatin (ZOCOR) 20 MG tablet, Take 1 tablet by mouth every night at bedtime., Disp: 90 tablet, Rfl: 3  •  SITagliptin (Januvia) 100 MG tablet, Take 1 tablet by mouth Daily., Disp: 90 tablet, Rfl: 3  •  vitamin D (ERGOCALCIFEROL) 1.25 MG (95864 UT) capsule capsule, TAKE 1 CAPSULE BY MOUTH ONE TIME PER WEEK, Disp: 12 capsule, Rfl: 3    Current Facility-Administered  Medications:   •  cyanocobalamin injection 1,000 mcg, 1,000 mcg, Intramuscular, Q28 Days, Aleah Morales MD, 1,000 mcg at 12/14/21 1625      LABS AND IMAGING  No visits with results within 1 Month(s) from this visit.   Latest known visit with results is:   Office Visit on 08/22/2022   Component Date Value Ref Range Status   • Hemoglobin A1C 08/22/2022 6.6  % Final   • Lot Number 08/22/2022 [id number]   Final   • Expiration Date 08/22/2022 04/03/2024   Final   • Glucose 08/22/2022 214 (A)  70 - 130 mg/dL Final   • Lot Number 08/22/2022 [id number]   Final   • Expiration Date 08/22/2022 02/26/2023   Final   • TSH 08/22/2022 1.170  0.270 - 4.200 uIU/mL Final   • Free T4 08/22/2022 0.97  0.93 - 1.70 ng/dL Final   • Glucose 08/22/2022 227 (H)  65 - 99 mg/dL Final   • BUN 08/22/2022 13  8 - 23 mg/dL Final   • Creatinine 08/22/2022 0.92  0.57 - 1.00 mg/dL Final   • Sodium 08/22/2022 140  136 - 145 mmol/L Final   • Potassium 08/22/2022 5.0  3.5 - 5.2 mmol/L Final   • Chloride 08/22/2022 104  98 - 107 mmol/L Final   • CO2 08/22/2022 24.0  22.0 - 29.0 mmol/L Final   • Calcium 08/22/2022 9.5  8.6 - 10.5 mg/dL Final   • Total Protein 08/22/2022 6.5  6.0 - 8.5 g/dL Final   • Albumin 08/22/2022 4.50  3.50 - 5.20 g/dL Final   • ALT (SGPT) 08/22/2022 16  1 - 33 U/L Final   • AST (SGOT) 08/22/2022 29  1 - 32 U/L Final   • Alkaline Phosphatase 08/22/2022 82  39 - 117 U/L Final   • Total Bilirubin 08/22/2022 0.6  0.0 - 1.2 mg/dL Final   • Globulin 08/22/2022 2.0  gm/dL Final   • A/G Ratio 08/22/2022 2.3  g/dL Final   • BUN/Creatinine Ratio 08/22/2022 14.1  7.0 - 25.0 Final   • Anion Gap 08/22/2022 12.0  5.0 - 15.0 mmol/L Final   • eGFR 08/22/2022 68.4  >60.0 mL/min/1.73 Final    National Kidney Foundation and American Society of Nephrology (ASN) Task Force recommended calculation based on the Chronic Kidney Disease Epidemiology Collaboration (CKD-EPI) equation refit without adjustment for race.           Assessment  Assessment & Plan    1. Type 2 diabetes mellitus without complication, without long-term current use of insulin (HCC)    2. Armour's disease (HCC)    3. Postablative hypothyroidism    4. Vitamin D deficiency      Orders Placed This Encounter   Procedures   • Comprehensive Metabolic Panel   • Lipid Panel   • TSH   • T4, Free     Plan    -Hydrocortisone dose: 20 mg in am and 5 mg in pm. Meds refilled.     -HTN - lisinopril 5 mg daily. BP is normal          - DM 2 - Continue Januvia 100 mg daily,          -continue Levothyroxine 50 mcg a day. Repeat labs    -cont weekly alendronate and high Vit D     Advised patient to get established with therapist. She has a lot of symptoms of depression.     Follow-up in 4 months    15 min spent for medical discussion

## 2023-01-01 RX ORDER — LEVOTHYROXINE SODIUM 0.05 MG/1
50 TABLET ORAL DAILY
Qty: 90 TABLET | Refills: 3 | Status: SHIPPED | OUTPATIENT
Start: 2023-01-01

## 2023-01-01 RX ORDER — HYDROCORTISONE 5 MG/1
TABLET ORAL
Qty: 200 TABLET | Refills: 3 | Status: SHIPPED | OUTPATIENT
Start: 2023-01-01

## 2023-01-01 RX ORDER — SIMVASTATIN 20 MG
20 TABLET ORAL
Qty: 90 TABLET | Refills: 3 | Status: SHIPPED | OUTPATIENT
Start: 2023-01-01

## 2023-01-01 RX ORDER — ERGOCALCIFEROL 1.25 MG/1
CAPSULE ORAL
Qty: 12 CAPSULE | Refills: 3 | Status: SHIPPED | OUTPATIENT
Start: 2023-01-01

## 2023-01-26 ENCOUNTER — OFFICE VISIT (OUTPATIENT)
Dept: ENDOCRINOLOGY | Facility: CLINIC | Age: 68
End: 2023-01-26
Payer: MEDICARE

## 2023-01-26 VITALS
DIASTOLIC BLOOD PRESSURE: 84 MMHG | HEART RATE: 93 BPM | OXYGEN SATURATION: 99 % | BODY MASS INDEX: 29.55 KG/M2 | SYSTOLIC BLOOD PRESSURE: 138 MMHG | WEIGHT: 127.7 LBS | HEIGHT: 55 IN

## 2023-01-26 DIAGNOSIS — E27.1 ADDISON'S DISEASE: ICD-10-CM

## 2023-01-26 DIAGNOSIS — E53.8 VITAMIN B12 DEFICIENCY: ICD-10-CM

## 2023-01-26 DIAGNOSIS — E53.8 COBALAMIN DEFICIENCY: ICD-10-CM

## 2023-01-26 DIAGNOSIS — I10 ESSENTIAL HYPERTENSION: Chronic | ICD-10-CM

## 2023-01-26 DIAGNOSIS — E78.2 MIXED HYPERLIPIDEMIA: ICD-10-CM

## 2023-01-26 DIAGNOSIS — M81.0 OSTEOPOROSIS, UNSPECIFIED OSTEOPOROSIS TYPE, UNSPECIFIED PATHOLOGICAL FRACTURE PRESENCE: ICD-10-CM

## 2023-01-26 DIAGNOSIS — E89.0 POSTABLATIVE HYPOTHYROIDISM: Chronic | ICD-10-CM

## 2023-01-26 DIAGNOSIS — E11.9 TYPE 2 DIABETES MELLITUS WITHOUT COMPLICATION, WITHOUT LONG-TERM CURRENT USE OF INSULIN: ICD-10-CM

## 2023-01-26 DIAGNOSIS — E11.9 CONTROLLED TYPE 2 DIABETES MELLITUS WITHOUT COMPLICATION, WITHOUT LONG-TERM CURRENT USE OF INSULIN: Primary | Chronic | ICD-10-CM

## 2023-01-26 LAB
DEPRECATED RDW RBC AUTO: 37.2 FL (ref 37–54)
ERYTHROCYTE [DISTWIDTH] IN BLOOD BY AUTOMATED COUNT: 12.1 % (ref 12.3–15.4)
EXPIRATION DATE: NORMAL
EXPIRATION DATE: NORMAL
GLUCOSE BLDC GLUCOMTR-MCNC: 115 MG/DL (ref 70–130)
HBA1C MFR BLD: 7.1 %
HCT VFR BLD AUTO: 38.4 % (ref 34–46.6)
HGB BLD-MCNC: 12.9 G/DL (ref 12–15.9)
Lab: NORMAL
Lab: NORMAL
MCH RBC QN AUTO: 28.9 PG (ref 26.6–33)
MCHC RBC AUTO-ENTMCNC: 33.6 G/DL (ref 31.5–35.7)
MCV RBC AUTO: 86.1 FL (ref 79–97)
PLATELET # BLD AUTO: 204 10*3/MM3 (ref 140–450)
PMV BLD AUTO: 10 FL (ref 6–12)
RBC # BLD AUTO: 4.46 10*6/MM3 (ref 3.77–5.28)
WBC NRBC COR # BLD: 11.31 10*3/MM3 (ref 3.4–10.8)

## 2023-01-26 PROCEDURE — 82947 ASSAY GLUCOSE BLOOD QUANT: CPT | Performed by: INTERNAL MEDICINE

## 2023-01-26 PROCEDURE — 85027 COMPLETE CBC AUTOMATED: CPT | Performed by: INTERNAL MEDICINE

## 2023-01-26 PROCEDURE — 3051F HG A1C>EQUAL 7.0%<8.0%: CPT | Performed by: INTERNAL MEDICINE

## 2023-01-26 PROCEDURE — 99214 OFFICE O/P EST MOD 30 MIN: CPT | Performed by: INTERNAL MEDICINE

## 2023-01-26 PROCEDURE — 96372 THER/PROPH/DIAG INJ SC/IM: CPT | Performed by: INTERNAL MEDICINE

## 2023-01-26 PROCEDURE — 80061 LIPID PANEL: CPT | Performed by: INTERNAL MEDICINE

## 2023-01-26 PROCEDURE — 83036 HEMOGLOBIN GLYCOSYLATED A1C: CPT | Performed by: INTERNAL MEDICINE

## 2023-01-26 PROCEDURE — 84443 ASSAY THYROID STIM HORMONE: CPT | Performed by: INTERNAL MEDICINE

## 2023-01-26 PROCEDURE — 84439 ASSAY OF FREE THYROXINE: CPT | Performed by: INTERNAL MEDICINE

## 2023-01-26 PROCEDURE — 80053 COMPREHEN METABOLIC PANEL: CPT | Performed by: INTERNAL MEDICINE

## 2023-01-26 PROCEDURE — 36415 COLL VENOUS BLD VENIPUNCTURE: CPT | Performed by: INTERNAL MEDICINE

## 2023-01-26 RX ORDER — CYANOCOBALAMIN 1000 UG/ML
1000 INJECTION, SOLUTION INTRAMUSCULAR; SUBCUTANEOUS
Status: DISCONTINUED | OUTPATIENT
Start: 2023-01-26 | End: 2023-04-06

## 2023-01-26 RX ORDER — ONDANSETRON 8 MG/1
8 TABLET, ORALLY DISINTEGRATING ORAL EVERY 8 HOURS PRN
Qty: 15 TABLET | Refills: 2 | Status: SHIPPED | OUTPATIENT
Start: 2023-01-26

## 2023-01-26 RX ADMIN — CYANOCOBALAMIN 1000 MCG: 1000 INJECTION, SOLUTION INTRAMUSCULAR; SUBCUTANEOUS at 11:43

## 2023-01-26 NOTE — PROGRESS NOTES
"Chief complaint  Diabetes and addisons disease (Follow UP)    Subjective   Karen Rubio is a 67 y.o. female is here today for follow-up.  Follow-up for Hypothyroidism and Gooding's disease.   Patient has multiple medical conditions: HTN, Depression, Diabetes mellitus.   Adrenal insufficiency / Gooding's disease dx in 2004. She is taking hydrocortisone and fludrocortisone.                                                                                                                      Diabetes mellitus type 2   On Januvia 100  mg daily.     Hypothyroidism postablative. S/p I-131 therapy at age 14. She is taking levothyroxine 50 mcg.       HTN - BP is normal.     Patient reported that her social situation is complicated. She has no resources to get to the pharmacy or groceries. She has a very small support group and has a friend who helps her with getting groceries.   She has severe deforming arthritis and pain . Has worsening of the condition. She has severe back pain, depression. C/o LLQ abdominal pain - she had ovarian surgery for cabcer. Left shoulder pain and sciatica pain. She cant move her left shoulder.   She has memory loss and frequently forgets her medications. She had pigmented lesion removed form the stomach and noticed that she has new lesions coming up. SHe didn't follow-up with dermatologist after procedure due to her stressful events.    She said that has difficulty climbing up the second floor to her apartment. She often feels dizzy and unstable, not able to bring groceries upstairs. She has asked for the letter to help with requesting a first level apartment.       Medications.    Current Outpatient Medications:   •  albuterol sulfate  (90 Base) MCG/ACT inhaler, Inhale 2 puffs Every 4 (Four) Hours As Needed for Wheezing., Disp: 1 g, Rfl: 1  •  alendronate (Fosamax) 70 MG tablet, Take 1 tablet by mouth Every 7 (Seven) Days., Disp: 12 tablet, Rfl: 3  •  B-D 3CC LUER-JELLY SYR 25GX1\" 25G X " "1\" 3 ML misc, AS DIRECTED WITH B12 INJECTIONS, Disp: 4 each, Rfl: 3  •  Belbuca 75 MCG film, , Disp: , Rfl:   •  carboxymethylcellulose (Lubricant Eye Drops) 0.5 % solution, Administer 1 drop to both eyes 3 (Three) Times a Day As Needed for Dry Eyes., Disp: 30 mL, Rfl: 11  •  cetirizine (zyrTEC) 10 MG tablet, TAKE ONE TABLET BY MOUTH EVERY DAY, Disp: 30 tablet, Rfl: 6  •  cyanocobalamin 1000 MCG/ML injection, INJECT 1 ML INTRAMUSCULARLY EVERY 2 WEEKS AS DIRECTED, Disp: 1 mL, Rfl: 1  •  diclofenac (VOLTAREN) 75 MG EC tablet, , Disp: , Rfl:   •  fluticasone (FLONASE) 50 MCG/ACT nasal spray, 1 spray into the nostril(s) as directed by provider Daily., Disp: 1 g, Rfl: 2  •  gabapentin (NEURONTIN) 300 MG capsule, , Disp: , Rfl:   •  glucose blood test strip, Test BG TID, Disp: 300 each, Rfl: 3  •  glucose monitor monitoring kit, Test BG TID, Disp: 1 each, Rfl: 0  •  hydrocortisone (CORTEF) 5 MG tablet, TAKE 4 TABLETS BY MOUTH EVERY DAY IN THE MORNING AND ALSO TAKE 1 TABLET EVERY DAY AT 3 PM (TAKE DOUBLE DOSE IN THE EVENT OF ILLNESS), Disp: 200 tablet, Rfl: 3  •  lactulose (CHRONULAC) 10 GM/15ML solution, , Disp: , Rfl:   •  Lancets misc, Test BG TID, Disp: 300 each, Rfl: 3  •  levothyroxine (SYNTHROID, LEVOTHROID) 50 MCG tablet, Take 1 tablet by mouth Daily., Disp: 90 tablet, Rfl: 3  •  lisinopril (PRINIVIL,ZESTRIL) 5 MG tablet, Take 1 tablet by mouth Daily. for blood pressure, Disp: 90 tablet, Rfl: 3  •  montelukast (SINGULAIR) 10 MG tablet, Take 1 tablet by mouth every night at bedtime., Disp: 90 tablet, Rfl: 1  •  ondansetron ODT (ZOFRAN ODT) 8 MG disintegrating tablet, Take 1 tablet by mouth Every 8 (Eight) Hours As Needed for Nausea or Vomiting., Disp: 15 tablet, Rfl: 2  •  oxyCODONE-acetaminophen (PERCOCET)  MG per tablet, Take 1 tablet by mouth Every 4 (Four) Hours As Needed for Moderate Pain ., Disp: 150 tablet, Rfl: 0  •  simvastatin (ZOCOR) 20 MG tablet, Take 1 tablet by mouth every night at bedtime., " Disp: 90 tablet, Rfl: 3  •  SITagliptin (Januvia) 100 MG tablet, Take 1 tablet by mouth Daily., Disp: 90 tablet, Rfl: 3  •  vitamin D (ERGOCALCIFEROL) 1.25 MG (11554 UT) capsule capsule, TAKE 1 CAPSULE BY MOUTH ONE TIME PER WEEK, Disp: 12 capsule, Rfl: 3  •  ALPRAZolam (XANAX) 1 MG tablet, Take 1 tablet by mouth 4 (Four) Times a Day., Disp: 28 tablet, Rfl: 0    Current Facility-Administered Medications:   •  cyanocobalamin injection 1,000 mcg, 1,000 mcg, Intramuscular, Q28 Days, Aleah Morales MD, 1,000 mcg at 12/14/21 1625      PHYSICAL EXAM  Physical Exam  Constitutional:       Appearance: She is ill-appearing.   Cardiovascular:      Rate and Rhythm: Normal rate and regular rhythm.      Pulses: Normal pulses.      Heart sounds: Normal heart sounds.   Pulmonary:      Effort: Pulmonary effort is normal.      Breath sounds: Normal breath sounds.   Musculoskeletal:         General: Deformity (spine) present.   Neurological:      Mental Status: She is alert.         LABS AND IMAGING  Office Visit on 01/26/2023   Component Date Value Ref Range Status   • Hemoglobin A1C 01/26/2023 7.1  % Final   • Lot Number 01/26/2023 [id number]   Final   • Expiration Date 01/26/2023 11/03/2024   Final   • Glucose 01/26/2023 115  70 - 130 mg/dL Final   • Lot Number 01/26/2023 [id number]   Final   • Expiration Date 01/26/2023 07/22/2023   Final           Assessment  Assessment & Plan   1. Controlled type 2 diabetes mellitus without complication, without long-term current use of insulin (HCC)    2. Postablative hypothyroidism    3. Hazelton's disease (HCC)    4. Osteoporosis, unspecified osteoporosis type, unspecified pathological fracture presence    5. Essential hypertension    6. Mixed hyperlipidemia    7. Cobalamin deficiency      Orders Placed This Encounter   Procedures   • POC Glycosylated Hemoglobin (Hb A1C)   • POC Glucose, Blood     Plan    1-Hydrocortisone dose: 20 mg in am and 5 mg in pm. Meds refilled.     2-HTN - lisinopril 5  mg daily. BP is normal         3- DM 2 - Continue Januvia 100 mg daily,     4- Continue Levothyroxine 50 mcg a day. Repeat labs    5 - Cont weekly alendronate and high Vit D     6  Recommended to see therapist and psychiatrist. Patient has appointment with therapist next month.     7. Patient needs to see GYN for ovarian tumor follow-up since she has symptoms of LLQ pain.  I have also advised her to call dermatologist since the pigmented lesion on the abdomen enlarged. She hasn't seen PCP  and I have recommended her to schedule appt with Dr Hernandez.     Labs today     Follow-up in 4 months

## 2023-01-27 LAB
ALBUMIN SERPL-MCNC: 4.4 G/DL (ref 3.5–5.2)
ALBUMIN/GLOB SERPL: 2.3 G/DL
ALP SERPL-CCNC: 64 U/L (ref 39–117)
ALT SERPL W P-5'-P-CCNC: 12 U/L (ref 1–33)
ANION GAP SERPL CALCULATED.3IONS-SCNC: 9 MMOL/L (ref 5–15)
AST SERPL-CCNC: 20 U/L (ref 1–32)
BILIRUB SERPL-MCNC: 0.7 MG/DL (ref 0–1.2)
BUN SERPL-MCNC: 13 MG/DL (ref 8–23)
BUN/CREAT SERPL: 13.5 (ref 7–25)
CALCIUM SPEC-SCNC: 9.4 MG/DL (ref 8.6–10.5)
CHLORIDE SERPL-SCNC: 106 MMOL/L (ref 98–107)
CHOLEST SERPL-MCNC: 136 MG/DL (ref 0–200)
CO2 SERPL-SCNC: 26 MMOL/L (ref 22–29)
CREAT SERPL-MCNC: 0.96 MG/DL (ref 0.57–1)
EGFRCR SERPLBLD CKD-EPI 2021: 64.6 ML/MIN/1.73
GLOBULIN UR ELPH-MCNC: 1.9 GM/DL
GLUCOSE SERPL-MCNC: 124 MG/DL (ref 65–99)
HDLC SERPL-MCNC: 58 MG/DL (ref 40–60)
LDLC SERPL CALC-MCNC: 61 MG/DL (ref 0–100)
LDLC/HDLC SERPL: 1.03 {RATIO}
POTASSIUM SERPL-SCNC: 5.4 MMOL/L (ref 3.5–5.2)
PROT SERPL-MCNC: 6.3 G/DL (ref 6–8.5)
SODIUM SERPL-SCNC: 141 MMOL/L (ref 136–145)
T4 FREE SERPL-MCNC: 0.96 NG/DL (ref 0.93–1.7)
TRIGL SERPL-MCNC: 92 MG/DL (ref 0–150)
TSH SERPL DL<=0.05 MIU/L-ACNC: 1.17 UIU/ML (ref 0.27–4.2)
VLDLC SERPL-MCNC: 17 MG/DL (ref 5–40)

## 2023-01-30 NOTE — TELEPHONE ENCOUNTER
----- Message from Princess Jeffrey sent at 2/7/2018 11:00 AM EST -----  Contact: PATIENT   SHE IS OUT AND NEEDS A REFILL ON:       ALPRAZolam (XANAX) 1 MG tablet         Sig - Route: Take 1 mg by mouth 4 (Four) Times a Day. - Oral       [facility], [facility] - [place] [address] [address] - [phone]  - [phone]  [phone] (Phone)  [phone] (Fax)       total assistance Yes

## 2023-02-02 RX ORDER — CETIRIZINE HYDROCHLORIDE 10 MG/1
TABLET ORAL
Qty: 30 TABLET | Refills: 2 | Status: SHIPPED | OUTPATIENT
Start: 2023-02-02

## 2023-03-20 ENCOUNTER — OFFICE VISIT (OUTPATIENT)
Dept: FAMILY MEDICINE CLINIC | Facility: CLINIC | Age: 68
End: 2023-03-20
Payer: MEDICARE

## 2023-03-20 VITALS
HEART RATE: 82 BPM | BODY MASS INDEX: 30.55 KG/M2 | HEIGHT: 55 IN | TEMPERATURE: 96.5 F | SYSTOLIC BLOOD PRESSURE: 142 MMHG | OXYGEN SATURATION: 97 % | RESPIRATION RATE: 16 BRPM | DIASTOLIC BLOOD PRESSURE: 88 MMHG | WEIGHT: 132 LBS

## 2023-03-20 DIAGNOSIS — I10 ESSENTIAL HYPERTENSION: Chronic | ICD-10-CM

## 2023-03-20 DIAGNOSIS — F41.9 ANXIETY: Primary | ICD-10-CM

## 2023-03-20 PROBLEM — S09.90XA INJURY OF HEAD: Status: ACTIVE | Noted: 2023-03-20

## 2023-03-20 PROBLEM — E87.0 HYPERNATREMIA: Status: ACTIVE | Noted: 2023-03-20

## 2023-03-20 PROBLEM — R21 RASH: Status: ACTIVE | Noted: 2023-03-20

## 2023-03-20 PROBLEM — F32.A DEPRESSION: Status: ACTIVE | Noted: 2023-03-20

## 2023-03-20 PROBLEM — R43.2 PARAGEUSIA: Status: ACTIVE | Noted: 2023-03-20

## 2023-03-20 PROBLEM — C48.2 PRIMARY PERITONEAL ADENOCARCINOMA (HCC): Status: ACTIVE | Noted: 2021-06-18

## 2023-03-20 PROBLEM — R53.83 FATIGUE: Status: ACTIVE | Noted: 2023-03-20

## 2023-03-20 PROBLEM — M54.9 BACK PAIN: Status: ACTIVE | Noted: 2023-03-20

## 2023-03-20 PROBLEM — R11.2 NAUSEA AND VOMITING: Status: ACTIVE | Noted: 2023-03-20

## 2023-03-20 PROCEDURE — 3077F SYST BP >= 140 MM HG: CPT | Performed by: FAMILY MEDICINE

## 2023-03-20 PROCEDURE — 1160F RVW MEDS BY RX/DR IN RCRD: CPT | Performed by: FAMILY MEDICINE

## 2023-03-20 PROCEDURE — 3051F HG A1C>EQUAL 7.0%<8.0%: CPT | Performed by: FAMILY MEDICINE

## 2023-03-20 PROCEDURE — 99213 OFFICE O/P EST LOW 20 MIN: CPT | Performed by: FAMILY MEDICINE

## 2023-03-20 PROCEDURE — 1159F MED LIST DOCD IN RCRD: CPT | Performed by: FAMILY MEDICINE

## 2023-03-20 PROCEDURE — 3079F DIAST BP 80-89 MM HG: CPT | Performed by: FAMILY MEDICINE

## 2023-03-20 RX ORDER — ALPRAZOLAM 1 MG/1
1 TABLET ORAL 3 TIMES DAILY PRN
Qty: 90 TABLET | Refills: 5 | Status: SHIPPED | OUTPATIENT
Start: 2023-03-20

## 2023-03-20 NOTE — PROGRESS NOTES
Subjective   Karen Rubio is a 67 y.o. female    Chief Complaint    Anxiety and derpression    History of Present Illness   The patient presents today for a follow-up visit regarding her anxiety and depression. She is requesting a refill of her alprazolam. The patient is currently taking this medication 4 times daily. Her TIFFANY is reviewed.    She reports she has been  from her  for over 1 year, as her  started abusing Xanax. She tried to talk to Javan about this. Her  passed away from an overdose of methamphetamine according to the patient.  We do not have confirmation of this.      The following portions of the patient's history were reviewed and updated as appropriate: allergies, current medications, past social history and problem list    Review of Systems   Constitutional: Negative for appetite change, fatigue and unexpected weight change.   Respiratory: Negative for cough, chest tightness and shortness of breath.    Cardiovascular: Negative for chest pain, palpitations and leg swelling.   Gastrointestinal: Negative for abdominal pain, diarrhea and nausea.   Skin: Negative for color change and rash.   Neurological: Negative for dizziness, tremors, syncope, weakness, light-headedness and headaches.   Psychiatric/Behavioral: Positive for dysphoric mood and sleep disturbance. Negative for agitation, behavioral problems, confusion, decreased concentration and suicidal ideas. The patient is nervous/anxious.        Objective     Vitals:    03/20/23 1024   BP: 142/88   Pulse: 82   Resp: 16   Temp: 96.5 °F (35.8 °C)   SpO2: 97%       Physical Exam  Vitals and nursing note reviewed.   Constitutional:       General: She is not in acute distress.     Appearance: Normal appearance. She is well-developed. She is not ill-appearing, toxic-appearing or diaphoretic.   Neck:      Vascular: No carotid bruit or JVD.   Cardiovascular:      Rate and Rhythm: Normal rate and regular rhythm.       Pulses: Normal pulses.      Heart sounds: Normal heart sounds. No murmur heard.  Pulmonary:      Effort: Pulmonary effort is normal. No respiratory distress.      Breath sounds: Normal breath sounds.   Abdominal:      Palpations: Abdomen is soft.      Tenderness: There is no abdominal tenderness.   Skin:     General: Skin is warm and dry.   Neurological:      Mental Status: She is alert.         Assessment & Plan   Problems Addressed this Visit        Cardiac and Vasculature    Essential hypertension (Chronic)       Mental Health    Anxiety - Primary    Relevant Medications    ALPRAZolam (XANAX) 1 MG tablet   Diagnoses       Codes Comments    Anxiety    -  Primary ICD-10-CM: F41.9  ICD-9-CM: 300.00     Essential hypertension     ICD-10-CM: I10  ICD-9-CM: 401.9         I spent 15 minutes in patient care: Reviewing records prior to the visit, examining the patient, entering orders and documentation    Part of this note may be an electronic transcription/translation of spoken language to printed text using the Dragon Dictation System.         Transcribed from ambient dictation for COLLINS Hernandez MD by Sera Finley.  03/20/23   11:36 EDT    Patient or patient representative verbalized consent to the visit recording.  I have personally performed the services described in this document as transcribed by the above individual, and it is both accurate and complete.

## 2023-04-06 ENCOUNTER — TELEPHONE (OUTPATIENT)
Dept: ENDOCRINOLOGY | Facility: CLINIC | Age: 68
End: 2023-04-06
Payer: MEDICARE

## 2023-04-06 RX ORDER — CYANOCOBALAMIN 1000 UG/ML
INJECTION, SOLUTION INTRAMUSCULAR; SUBCUTANEOUS
Qty: 1 ML | Refills: 1 | Status: SHIPPED | OUTPATIENT
Start: 2023-04-06

## 2023-04-06 NOTE — TELEPHONE ENCOUNTER
PT CALLED STATING SHE HAS BEEN OUT OF HER INJECTABLE RX FOR FOUR MONTHS. SHE WAS UNSURE WHAT THE MEDICATION IS CALLED. SHE REQUESTED A CALL BACK ASAP.

## 2023-04-06 NOTE — TELEPHONE ENCOUNTER
Pt called after hours for B12 injection to be refilled. She has missed several months of this injection as she forgets to go.     I already sent to the pharmacy today.     I informed the pt this injection is not related to her Shawn's disease. We ask for 48-72 hrs to send in a refill and she only called late afternoon before close. This is not an emergency and should be addressed during business hours (and was as the script was already sent). As the script has been sent, there should be no concern at this time.

## 2023-04-06 NOTE — TELEPHONE ENCOUNTER
PT CALLED AGAIN TO CHECK STATUS OF NOTE SENT @ 3:56pm. PT STATED SHE HAS BEEN TRYING TO GET THIS MEDICATION FROM US FOR THE PAST FOUR MONTHS AND HAS NOT BEEN ABLE TO GET A HOLD OF ANYONE. SHE REQUESTED WE REACH OUT TO HER TODAY.

## 2023-04-06 NOTE — TELEPHONE ENCOUNTER
Patient was seen on 1/26/2023    Tried to contact the patient, # is not working, call  does not complete

## 2023-04-14 ENCOUNTER — TELEPHONE (OUTPATIENT)
Dept: ENDOCRINOLOGY | Facility: CLINIC | Age: 68
End: 2023-04-14
Payer: MEDICARE

## 2023-04-14 DIAGNOSIS — E27.1 ADDISON'S DISEASE: Chronic | ICD-10-CM

## 2023-04-14 RX ORDER — ALBUTEROL SULFATE 90 UG/1
2 AEROSOL, METERED RESPIRATORY (INHALATION) EVERY 4 HOURS PRN
Qty: 1 G | Refills: 1 | Status: SHIPPED | OUTPATIENT
Start: 2023-04-14

## 2023-04-14 RX ORDER — ALBUTEROL SULFATE 90 UG/1
AEROSOL, METERED RESPIRATORY (INHALATION)
Qty: 18 G | Refills: 0 | OUTPATIENT
Start: 2023-04-14

## 2023-04-14 RX ORDER — ALBUTEROL SULFATE 90 UG/1
2 AEROSOL, METERED RESPIRATORY (INHALATION) EVERY 4 HOURS PRN
Qty: 1 G | Refills: 1 | OUTPATIENT
Start: 2023-04-14

## 2023-04-14 NOTE — TELEPHONE ENCOUNTER
"Pt called call center after hours requesting prescription for albuterol inhailer. I had  inform patient that she should call call her PCP's office as she called the after ours provider for Endocrinology. Pt told the  that she does not have a PCP and hasn't seen one in over a year. She also stated \"Dr. Morales will send in any prescription I need.\"  I agreed to send the prescription for albuterol as I did not have access to Epic at the time of her call.     OF NOTE: Pt does have a PCP and has an  Appointment with Dr. Kelly on 09/20/2023.     Will inform Dr. Morales, our , and Dr. Hernandez so that patient can be instructed to call the appropriate providers office for after hours requests.     Signed: Gita Garcia MD    "

## 2023-04-17 RX ORDER — HYDROCORTISONE 5 MG/1
TABLET ORAL
Qty: 100 TABLET | Refills: 3 | Status: SHIPPED | OUTPATIENT
Start: 2023-04-17

## 2023-08-11 ENCOUNTER — OFFICE VISIT (OUTPATIENT)
Dept: FAMILY MEDICINE CLINIC | Facility: CLINIC | Age: 68
End: 2023-08-11
Payer: MEDICARE

## 2023-08-11 VITALS
WEIGHT: 126 LBS | BODY MASS INDEX: 29.16 KG/M2 | DIASTOLIC BLOOD PRESSURE: 88 MMHG | OXYGEN SATURATION: 99 % | HEIGHT: 55 IN | HEART RATE: 106 BPM | RESPIRATION RATE: 18 BRPM | SYSTOLIC BLOOD PRESSURE: 134 MMHG

## 2023-08-11 DIAGNOSIS — F41.9 ANXIETY: ICD-10-CM

## 2023-08-11 PROCEDURE — 3079F DIAST BP 80-89 MM HG: CPT | Performed by: FAMILY MEDICINE

## 2023-08-11 PROCEDURE — 99213 OFFICE O/P EST LOW 20 MIN: CPT | Performed by: FAMILY MEDICINE

## 2023-08-11 PROCEDURE — 1160F RVW MEDS BY RX/DR IN RCRD: CPT | Performed by: FAMILY MEDICINE

## 2023-08-11 PROCEDURE — 1159F MED LIST DOCD IN RCRD: CPT | Performed by: FAMILY MEDICINE

## 2023-08-11 PROCEDURE — 3075F SYST BP GE 130 - 139MM HG: CPT | Performed by: FAMILY MEDICINE

## 2023-08-11 PROCEDURE — 3044F HG A1C LEVEL LT 7.0%: CPT | Performed by: FAMILY MEDICINE

## 2023-08-11 RX ORDER — ALPRAZOLAM 1 MG/1
1 TABLET ORAL 3 TIMES DAILY PRN
Qty: 90 TABLET | Refills: 5 | Status: SHIPPED | OUTPATIENT
Start: 2023-08-11

## 2023-08-11 NOTE — PROGRESS NOTES
Subjective   Karen Rubio is a 67 y.o. female    Chief Complaint    Anxiety    Anxiety  Symptoms include nervous/anxious behavior. Patient reports no confusion, decreased concentration, dizziness, nausea, shortness of breath or suicidal ideas.     History of present illness  The patient presents today accompanied by a caregiver and a service puppy dog. She is due for a refill of her alprazolam to be sent to the [facility] pharmacy. She is currently on alprazolam 1 mg 3 times daily.    She is enjoying her new service dog. Her mother  right after Bubba, patient's ex-spouse, did. Her family did not come around because he was on meth.     The following portions of the patient's history were reviewed and updated as appropriate: allergies, current medications, past social history and problem list    Review of Systems   Constitutional:  Negative for appetite change, fatigue and unexpected weight change.   Respiratory:  Negative for chest tightness and shortness of breath.    Gastrointestinal:  Negative for abdominal pain, diarrhea and nausea.   Neurological:  Negative for dizziness, tremors, weakness, light-headedness and headaches.   Psychiatric/Behavioral:  Positive for dysphoric mood and sleep disturbance. Negative for agitation, behavioral problems, confusion, decreased concentration, hallucinations and suicidal ideas. The patient is nervous/anxious. The patient is not hyperactive.      Objective     Vitals:    23 1424   BP: 134/88   Pulse: 106   Resp: 18   SpO2: 99%       Physical Exam  Vitals and nursing note reviewed.   Constitutional:       General: She is not in acute distress.     Appearance: Normal appearance. She is well-developed. She is not ill-appearing, toxic-appearing or diaphoretic.   Eyes:      Conjunctiva/sclera: Conjunctivae normal.   Cardiovascular:      Rate and Rhythm: Normal rate and regular rhythm.   Pulmonary:      Effort: Pulmonary effort is normal.      Breath sounds: Normal  breath sounds.   Neurological:      Mental Status: She is alert and oriented to person, place, and time.      Coordination: Coordination normal.   Psychiatric:         Attention and Perception: She is attentive.         Mood and Affect: Mood normal.         Behavior: Behavior normal.         Thought Content: Thought content normal.         Judgment: Judgment normal.       Assessment & Plan   Problems Addressed this Visit          Mental Health    Anxiety    Relevant Medications    ALPRAZolam (XANAX) 1 MG tablet     Diagnoses         Codes Comments    Anxiety     ICD-10-CM: F41.9  ICD-9-CM: 300.00           Plan    Renew prescription for 6 months.  Follow-up at that time.    I spent 15 minutes in patient care: Reviewing records prior to the visit, examining the patient, entering orders and documentation    Part of this note may be an electronic transcription/translation of spoken language to printed text using the Dragon Dictation System.          Transcribed from ambient dictation for COLLINS Hernandez MD by Sally Lizama.  08/11/23   16:37 EDT    Patient or patient representative verbalized consent to the visit recording.  I have personally performed the services described in this document as transcribed by the above individual, and it is both accurate and complete.

## 2023-10-25 DIAGNOSIS — J30.2 SEASONAL ALLERGIC RHINITIS: ICD-10-CM

## 2023-10-25 RX ORDER — MONTELUKAST SODIUM 10 MG/1
10 TABLET ORAL
Qty: 90 TABLET | Refills: 0 | Status: SHIPPED | OUTPATIENT
Start: 2023-10-25

## 2023-10-25 NOTE — TELEPHONE ENCOUNTER
Rx Refill Note    Requested Prescriptions     Pending Prescriptions Disp Refills    montelukast (SINGULAIR) 10 MG tablet [Pharmacy Med Name: *MONTELUKAST 10MG] 90 tablet 0     Sig: TAKE 1 TABLET AT BEDTIME.        Last office visit with prescribing clinician: 7/19/2023       Next office visit with prescribing clinician: 12/12/2023     {    Tania Emery MA  10/25/23, 12:12 EDT    2

## 2023-12-04 NOTE — TELEPHONE ENCOUNTER
Head,  normocephalic,  atraumatic,  Face,  Face within normal limits,  Ears,  External ears within normal limits,  Nose/Nasopharynx,  External nose  normal appearance,  nares patent,  no nasal discharge,  Mouth and Throat,  Oral cavity appearance normal,  Breath odor normal,  Lips,  Appearance normal I have sent a message to Dr. Morales about if/ when we would see the patient. Pt is aware that she must be seen in the office. Becky does not have any openings at this time.

## 2024-01-12 ENCOUNTER — TELEPHONE (OUTPATIENT)
Dept: ENDOCRINOLOGY | Facility: CLINIC | Age: 69
End: 2024-01-12

## 2024-01-13 DIAGNOSIS — J30.2 SEASONAL ALLERGIC RHINITIS: ICD-10-CM

## 2024-01-15 NOTE — TELEPHONE ENCOUNTER
Rx Refill Note  Requested Prescriptions     Pending Prescriptions Disp Refills    montelukast (SINGULAIR) 10 MG tablet [Pharmacy Med Name: montelukast 10 mg tablet] 90 tablet 0     Sig: TAKE 1 TABLET BY MOUTH AT BEDTIME.      Last office visit with prescribing clinician: 7/19/2023   Last telemedicine visit with prescribing clinician: Visit date not found   Next office visit with prescribing clinician: Visit date not found                         Would you like a call back once the refill request has been completed: [] Yes [] No    If the office needs to give you a call back, can they leave a voicemail: [] Yes [] No    Nevin Stafford MA  01/15/24, 08:16 EST

## 2024-01-16 DIAGNOSIS — E55.9 VITAMIN D DEFICIENCY: ICD-10-CM

## 2024-01-16 RX ORDER — ERGOCALCIFEROL 1.25 MG/1
CAPSULE ORAL
Qty: 12 CAPSULE | Refills: 3 | Status: SHIPPED | OUTPATIENT
Start: 2024-01-16

## 2024-01-16 RX ORDER — CYANOCOBALAMIN 1000 UG/ML
INJECTION, SOLUTION INTRAMUSCULAR; SUBCUTANEOUS
Qty: 1 ML | Refills: 1 | Status: SHIPPED | OUTPATIENT
Start: 2024-01-16

## 2024-01-16 RX ORDER — MONTELUKAST SODIUM 10 MG/1
10 TABLET ORAL
Qty: 90 TABLET | Refills: 0 | Status: SHIPPED | OUTPATIENT
Start: 2024-01-16

## 2024-01-16 NOTE — TELEPHONE ENCOUNTER
Rx Refill Note  Requested Prescriptions     Pending Prescriptions Disp Refills    vitamin D (ERGOCALCIFEROL) 1.25 MG (63651 UT) capsule capsule [Pharmacy Med Name: ergocalciferol (vitamin D2) 1,250 mcg (50,000 unit) capsule] 12 capsule 3     Sig: TAKE 1 CAPSULE BY MOUTH ONE TIME PER WEEK      Last office visit with prescribing clinician: 7/19/2023   Last telemedicine visit with prescribing clinician: Visit date not found   Next office visit with prescribing clinician: Visit date not found                         Would you like a call back once the refill request has been completed: [] Yes [] No    If the office needs to give you a call back, can they leave a voicemail: [] Yes [] No    Nevin Stafford MA  01/16/24, 09:37 EST

## 2024-01-16 NOTE — TELEPHONE ENCOUNTER
Rx Refill Note  Requested Prescriptions     Pending Prescriptions Disp Refills    cyanocobalamin 1000 MCG/ML injection [Pharmacy Med Name: cyanocobalamin (vit B-12) 1,000 mcg/mL injection solution] 1 mL 1     Sig: INJECT 1ML INTRAMUSCULARLY EVERY 2 WEEKS AS DIRECTED      Last office visit with prescribing clinician: 7/19/2023   Last telemedicine visit with prescribing clinician: Visit date not found   Next office visit with prescribing clinician: 1/16/2024                         Would you like a call back once the refill request has been completed: [] Yes [] No    If the office needs to give you a call back, can they leave a voicemail: [] Yes [] No    Nevin Stafford MA  01/16/24, 09:36 EST

## 2024-01-18 DIAGNOSIS — F32.A DEPRESSION, UNSPECIFIED DEPRESSION TYPE: ICD-10-CM

## 2024-01-18 RX ORDER — SERTRALINE HYDROCHLORIDE 25 MG/1
25 TABLET, FILM COATED ORAL DAILY
Qty: 30 TABLET | Refills: 2 | Status: SHIPPED | OUTPATIENT
Start: 2024-01-18 | End: 2025-01-17

## 2024-01-18 NOTE — TELEPHONE ENCOUNTER
Rx Refill Note    Requested Prescriptions     Pending Prescriptions Disp Refills    sertraline (ZOLOFT) 25 MG tablet [Pharmacy Med Name: sertraline 25 mg tablet] 30 tablet 2     Sig: TAKE 1 TABLET BY MOUTH DAILY.        Last office visit with prescribing clinician: 7/19/2023       Next office visit with prescribing clinician: Visit date not found     {    Tania Emery MA  01/18/24, 11:06 EST

## 2024-02-02 ENCOUNTER — TELEPHONE (OUTPATIENT)
Dept: ENDOCRINOLOGY | Facility: CLINIC | Age: 69
End: 2024-02-02
Payer: MEDICARE

## 2024-02-02 NOTE — TELEPHONE ENCOUNTER
Caller: Karen Rubio     Relationship: SELF    Best call back number: [phone]     Requested Prescriptions:   - fluticasone (FLONASE) 50 MCG/ACT nasal spray [31650] (Order [id number])     - levothyroxine (SYNTHROID, LEVOTHROID) 50 MCG tablet [4421] (Order [id number])     - ondansetron ODT (Zofran ODT) 8 MG disintegrating tablet [02708] (Order [id number])     - SITagliptin (Januvia) 100 MG tablet [72595] (Order [id number])     - lisinopril (PRINIVIL,ZESTRIL) 5 MG tablet [08916] (Order [id number])     - hydrocortisone (CORTEF) 5 MG tablet [96114] (Order [id number])     - alendronate (Fosamax) 70 MG tablet [34852] (Order [id number])       Pharmacy where request should be sent:    [address] [facility] - [phone]  - [phone]      Last office visit with prescribing clinician: 7/19/2023     Additional details provided by patient: PATIENT IS WORKING ON CHANGING INSURANCE TO BE IN NETWORK WITH [facility]. UNTIL SHE CAN SWITCH, SHE WILL NEED REFILLS ON HER MEDICATIONS TO LAST HER UNTIL THEN.     Does the patient have less than a 3 day supply:  [] Yes  [x] No    Would you like a call back once the refill request has been completed: [x] Yes [] No    If the office needs to give you a call back, can they leave a voicemail: [x] Yes [] No

## 2024-02-08 DIAGNOSIS — E89.0 POSTABLATIVE HYPOTHYROIDISM: Chronic | ICD-10-CM

## 2024-02-08 NOTE — TELEPHONE ENCOUNTER
This patient no showed her last appt with Dr. Morales-- she needs an appt and then we can refill her rx.  Thanks RT

## 2024-02-08 NOTE — TELEPHONE ENCOUNTER
Rx Refill Note  Requested Prescriptions     Pending Prescriptions Disp Refills    levothyroxine (SYNTHROID, LEVOTHROID) 50 MCG tablet [Pharmacy Med Name: levothyroxine 50 mcg tablet] 90 tablet 3     Sig: TAKE 1 TABLET BY MOUTH DAILY.      Last office visit with prescribing clinician: 7/19/2023   Last telemedicine visit with prescribing clinician: Visit date not found   Next office visit with prescribing clinician: Visit date not found                         Would you like a call back once the refill request has been completed: [] Yes [] No    If the office needs to give you a call back, can they leave a voicemail: [] Yes [] No    Nevin Stafford MA  02/08/24, 11:41 EST

## 2024-02-09 ENCOUNTER — OFFICE VISIT (OUTPATIENT)
Dept: FAMILY MEDICINE CLINIC | Facility: CLINIC | Age: 69
End: 2024-02-09
Payer: MEDICARE

## 2024-02-09 VITALS
WEIGHT: 109.4 LBS | TEMPERATURE: 98 F | SYSTOLIC BLOOD PRESSURE: 120 MMHG | OXYGEN SATURATION: 98 % | RESPIRATION RATE: 16 BRPM | BODY MASS INDEX: 25.32 KG/M2 | DIASTOLIC BLOOD PRESSURE: 66 MMHG | HEIGHT: 55 IN | HEART RATE: 82 BPM

## 2024-02-09 DIAGNOSIS — M54.41 CHRONIC BILATERAL LOW BACK PAIN WITH BILATERAL SCIATICA: ICD-10-CM

## 2024-02-09 DIAGNOSIS — G89.29 CHRONIC BILATERAL LOW BACK PAIN WITH BILATERAL SCIATICA: ICD-10-CM

## 2024-02-09 DIAGNOSIS — F32.9 MAJOR DEPRESSIVE DISORDER WITH CURRENT ACTIVE EPISODE, UNSPECIFIED DEPRESSION EPISODE SEVERITY, UNSPECIFIED WHETHER RECURRENT: ICD-10-CM

## 2024-02-09 DIAGNOSIS — M54.42 CHRONIC BILATERAL LOW BACK PAIN WITH BILATERAL SCIATICA: ICD-10-CM

## 2024-02-09 DIAGNOSIS — F41.9 ANXIETY: Primary | ICD-10-CM

## 2024-02-09 DIAGNOSIS — I10 ESSENTIAL HYPERTENSION: ICD-10-CM

## 2024-02-09 PROCEDURE — 1126F AMNT PAIN NOTED NONE PRSNT: CPT | Performed by: FAMILY MEDICINE

## 2024-02-09 PROCEDURE — 3044F HG A1C LEVEL LT 7.0%: CPT | Performed by: FAMILY MEDICINE

## 2024-02-09 PROCEDURE — 3074F SYST BP LT 130 MM HG: CPT | Performed by: FAMILY MEDICINE

## 2024-02-09 PROCEDURE — 99213 OFFICE O/P EST LOW 20 MIN: CPT | Performed by: FAMILY MEDICINE

## 2024-02-09 PROCEDURE — 3078F DIAST BP <80 MM HG: CPT | Performed by: FAMILY MEDICINE

## 2024-02-09 RX ORDER — ALPRAZOLAM 1 MG
1 TABLET ORAL 3 TIMES DAILY PRN
Qty: 90 TABLET | Refills: 5 | Status: SHIPPED | OUTPATIENT
Start: 2024-02-09

## 2024-02-10 NOTE — PROGRESS NOTES
"Subjective   Karen Rubio is a 68 y.o. female      Chief Complaint  Anxiety     History of Present Illness  Karen Rubio is a 68-year-old female who presents today for a 6-month follow-up regarding her anxiety. She is due for a refill on her alprazolam.    The patient states that she was sitting a while ago and heard a pop in her back. She has severe pain when she traverses stairs. She notices a lot of crepitus and popping in the back whenever she gets up. She has been told her spine is severely deteriorated. She was advised not to take Tylenol as it can exacerbate her abdominal pain. She is not taking aspirin or NSAIDs. She is seeing pain management, Dr. Lira, for her back pain. She is due to follow up with pain management at the end of 02/2024.    She complains of severe abdominal pain. The patient states her \"stomach burns like fire.\" She does not have a gastroenterologist. She will see  Please confirm in EHR) in a couple of months.     The patient had a gynecology exam in 08/2023. They checked for cancer, and they did not see any signs of it. She states that it had been almost 17 years since she had a gynecological exam. She sees Dr. Roberts.    She still lives in an apartment.  Anxiety  Symptoms include nervous/anxious behavior. Patient reports no confusion, decreased concentration, dizziness, nausea, shortness of breath or suicidal ideas.           The following portions of the patient's history were reviewed and updated as appropriate: allergies, current medications, past social history and problem list.    Review of Systems   Constitutional: Negative.  Negative for appetite change and fatigue.   Respiratory: Negative.  Negative for chest tightness and shortness of breath.    Gastrointestinal: Negative.  Negative for abdominal pain, diarrhea and nausea.   Musculoskeletal:  Positive for back pain. Negative for arthralgias, gait problem and myalgias.   Neurological:  Negative for dizziness, tremors, " weakness, light-headedness, numbness and headaches.   Psychiatric/Behavioral:  Positive for dysphoric mood and sleep disturbance. Negative for agitation, behavioral problems, confusion, decreased concentration and suicidal ideas. The patient is nervous/anxious.          Objective         Vitals:    02/09/24 1306   BP: 120/66   Pulse: 82   Resp: 16   Temp: 98 °F (36.7 °C)   SpO2: 98%         Physical Exam  Vitals and nursing note reviewed.   Constitutional:       Appearance: Normal appearance. She is well-developed.   HENT:      Head: Normocephalic and atraumatic.   Eyes:      General: No scleral icterus.     Conjunctiva/sclera: Conjunctivae normal.      Pupils: Pupils are equal, round, and reactive to light.   Cardiovascular:      Rate and Rhythm: Normal rate and regular rhythm.   Pulmonary:      Effort: Pulmonary effort is normal.      Breath sounds: Normal breath sounds.   Abdominal:      General: Bowel sounds are normal.      Palpations: Abdomen is soft.   Musculoskeletal:      Lumbar back: Tenderness and bony tenderness present. No swelling, deformity or spasms. Decreased range of motion.   Neurological:      Mental Status: She is alert and oriented to person, place, and time.      Deep Tendon Reflexes: Reflexes are normal and symmetric.   Psychiatric:         Mood and Affect: Mood normal.         Behavior: Behavior normal.              No results were obtained or interpreted today.      Assessment & Plan           I spent 25 minutes in patient care: Reviewing records prior to the visit, examining the patient, entering orders and documentation    Part of this note may be an electronic transcription/translation of spoken language to printed text using the Dragon Dictation System.          Transcribed from ambient dictation for COLLINS Hernandez MD by Raymond Luis.  02/09/24   20:39 EST    Patient or patient representative verbalized consent to the visit recording.  I have personally performed the services  described in this document as transcribed by the above individual, and it is both accurate and complete.

## 2024-02-20 RX ORDER — CYANOCOBALAMIN 1000 UG/ML
INJECTION, SOLUTION INTRAMUSCULAR; SUBCUTANEOUS
Qty: 1 ML | Refills: 1 | Status: SHIPPED | OUTPATIENT
Start: 2024-02-20

## 2024-02-20 NOTE — TELEPHONE ENCOUNTER
Rx Refill Note  Requested Prescriptions     Pending Prescriptions Disp Refills    cyanocobalamin 1000 MCG/ML injection [Pharmacy Med Name: cyanocobalamin (vit B-12) 1,000 mcg/mL injection solution] 1 mL 1     Sig: INJECT 1ML INTRAMUSCULARLY EVERY 2 WEEKS AS DIRECTED      Last office visit with prescribing clinician: 7/19/2023   Last telemedicine visit with prescribing clinician: Visit date not found   Next office visit with prescribing clinician: Visit date not found                         Would you like a call back once the refill request has been completed: [] Yes [] No    If the office needs to give you a call back, can they leave a voicemail: [] Yes [] No    Nevin Stafford MA  02/20/24, 09:20 [facility] Refill Note  Requested Prescriptions     Pending Prescriptions Disp Refills    cyanocobalamin 1000 MCG/ML injection [Pharmacy Med Name: cyanocobalamin (vit B-12) 1,000 mcg/mL injection solution] 1 mL 1     Sig: INJECT 1ML INTRAMUSCULARLY EVERY 2 WEEKS AS DIRECTED      Last office visit with prescribing clinician: 7/19/2023   Last telemedicine visit with prescribing clinician: Visit date not found   Next office visit with prescribing clinician: Visit date not found                         Would you like a call back once the refill request has been completed: [] Yes [] No    If the office needs to give you a call back, can they leave a voicemail: [] Yes [] No    Nevin Stafford MA  02/20/24, 09:20 EST

## 2024-02-21 ENCOUNTER — OFFICE VISIT (OUTPATIENT)
Dept: ENDOCRINOLOGY | Facility: CLINIC | Age: 69
End: 2024-02-21
Payer: MEDICARE

## 2024-02-21 VITALS
HEIGHT: 55 IN | BODY MASS INDEX: 25.69 KG/M2 | DIASTOLIC BLOOD PRESSURE: 74 MMHG | SYSTOLIC BLOOD PRESSURE: 142 MMHG | HEART RATE: 68 BPM | WEIGHT: 111 LBS

## 2024-02-21 DIAGNOSIS — E89.0 POSTABLATIVE HYPOTHYROIDISM: Chronic | ICD-10-CM

## 2024-02-21 DIAGNOSIS — E27.1 ADDISON'S DISEASE: Chronic | ICD-10-CM

## 2024-02-21 DIAGNOSIS — M81.0 OSTEOPOROSIS, UNSPECIFIED OSTEOPOROSIS TYPE, UNSPECIFIED PATHOLOGICAL FRACTURE PRESENCE: ICD-10-CM

## 2024-02-21 DIAGNOSIS — E55.9 VITAMIN D DEFICIENCY: ICD-10-CM

## 2024-02-21 DIAGNOSIS — E11.9 CONTROLLED TYPE 2 DIABETES MELLITUS WITHOUT COMPLICATION, WITHOUT LONG-TERM CURRENT USE OF INSULIN: Primary | Chronic | ICD-10-CM

## 2024-02-21 LAB
EXPIRATION DATE: ABNORMAL
EXPIRATION DATE: ABNORMAL
GLUCOSE BLDC GLUCOMTR-MCNC: 174 MG/DL (ref 70–130)
HBA1C MFR BLD: 6.2 % (ref 4.5–5.7)
Lab: ABNORMAL
Lab: ABNORMAL

## 2024-02-21 PROCEDURE — 84443 ASSAY THYROID STIM HORMONE: CPT | Performed by: INTERNAL MEDICINE

## 2024-02-21 PROCEDURE — 80053 COMPREHEN METABOLIC PANEL: CPT | Performed by: INTERNAL MEDICINE

## 2024-02-21 PROCEDURE — 84439 ASSAY OF FREE THYROXINE: CPT | Performed by: INTERNAL MEDICINE

## 2024-02-21 PROCEDURE — 82306 VITAMIN D 25 HYDROXY: CPT | Performed by: INTERNAL MEDICINE

## 2024-02-21 RX ORDER — LEVOTHYROXINE SODIUM 0.05 MG/1
50 TABLET ORAL DAILY
Qty: 90 TABLET | Refills: 3 | Status: SHIPPED | OUTPATIENT
Start: 2024-02-21

## 2024-02-21 RX ORDER — HYDROCORTISONE 5 MG/1
TABLET ORAL
Qty: 500 TABLET | Refills: 3 | Status: SHIPPED | OUTPATIENT
Start: 2024-02-21

## 2024-02-21 RX ORDER — LEVOTHYROXINE SODIUM 0.05 MG/1
50 TABLET ORAL DAILY
Qty: 90 TABLET | Refills: 0 | OUTPATIENT
Start: 2024-02-21

## 2024-02-21 NOTE — PROGRESS NOTES
"Chief complaint  Diabetes    Subjective   Karen Rubio is a 68 y.o. female is here today for follow-up.  Follow-up for Hypothyroidism and Hoke's disease.   Patient has multiple medical conditions: HTN, Depression, Diabetes mellitus.   Adrenal insufficiency / Shawn's disease dx in 2004. She is taking hydrocortisone and fludrocortisone.                                                                                                                      Diabetes mellitus type 2   On Januvia 100  mg daily.     Hypothyroidism postablative. S/p I-131 therapy at age 14. She is taking levothyroxine 50 mcg.       HTN - BP is normal. She is taking lisinopril.     She reported feeling depressed, having lack of motivation. She doesn't leave the house most of the days. Compliant with the medications. Pt reported depressed mood, she feels isolated and lonely, feels that her family and friends are not talking to her. She didn't get to see therapist.   She also has severe back pain and difficulty walking. She lives on the second floor of the apartment and coming up the steps is the problem for her.     Medications.    Current Outpatient Medications:     albuterol sulfate  (90 Base) MCG/ACT inhaler, Inhale 2 puffs Every 4 (Four) Hours As Needed for Wheezing., Disp: 1 g, Rfl: 1    alendronate (Fosamax) 70 MG tablet, Take 1 tablet by mouth Every 7 (Seven) Days., Disp: 12 tablet, Rfl: 3    ALPRAZolam (XANAX) 1 MG tablet, Take 1 tablet by mouth 3 (Three) Times a Day As Needed for Anxiety., Disp: 90 tablet, Rfl: 5    B-D 3CC LUER-JELLY SYR 25GX1\" 25G X 1\" 3 ML misc, AS DIRECTED WITH B12 INJECTIONS, Disp: 4 each, Rfl: 3    Belbuca 75 MCG film, , Disp: , Rfl:     carboxymethylcellulose (Lubricant Eye Drops) 0.5 % solution, Administer 1 drop to both eyes 3 (Three) Times a Day As Needed for Dry Eyes., Disp: 30 mL, Rfl: 11    cetirizine (zyrTEC) 10 MG tablet, TAKE 1 TABLET BY MOUTH ONCE DAILY., Disp: 30 tablet, Rfl: 1    " cyanocobalamin 1000 MCG/ML injection, INJECT 1ML INTRAMUSCULARLY EVERY 2 WEEKS AS DIRECTED, Disp: 1 mL, Rfl: 1    fluticasone (FLONASE) 50 MCG/ACT nasal spray, 1 spray into the nostril(s) as directed by provider Daily., Disp: 1 g, Rfl: 2    glucose blood test strip, Test BG TID, Disp: 300 each, Rfl: 3    glucose monitor monitoring kit, Test BG TID, Disp: 1 each, Rfl: 0    hydrocortisone (CORTEF) 5 MG tablet, TAKE 4 TABLETS BY MOUTH EVERY DAY IN THE MORNING AND TAKE 1 TABLET EVERY DAY AT 3 IN THE EVENING (TAKE DOUBLE DOSE IN THE EVENT OF ILLNESS), Disp: 500 tablet, Rfl: 3    lactulose (CHRONULAC) 10 GM/15ML solution, , Disp: , Rfl:     Lancets misc, Test BG TID, Disp: 300 each, Rfl: 3    levothyroxine (SYNTHROID, LEVOTHROID) 50 MCG tablet, Take 1 tablet by mouth Daily., Disp: 90 tablet, Rfl: 3    lisinopril (PRINIVIL,ZESTRIL) 5 MG tablet, Take 1 tablet by mouth Daily. for blood pressure, Disp: 90 tablet, Rfl: 3    montelukast (SINGULAIR) 10 MG tablet, TAKE 1 TABLET BY MOUTH AT BEDTIME., Disp: 90 tablet, Rfl: 0    ondansetron ODT (Zofran ODT) 8 MG disintegrating tablet, Place 1 tablet on the tongue Every 8 (Eight) Hours As Needed for Nausea or Vomiting., Disp: 15 tablet, Rfl: 2    oxyCODONE-acetaminophen (PERCOCET)  MG per tablet, Take 1 tablet by mouth Every 4 (Four) Hours As Needed for Moderate Pain ., Disp: 150 tablet, Rfl: 0    simvastatin (ZOCOR) 20 MG tablet, Take 1 tablet by mouth every night at bedtime., Disp: 90 tablet, Rfl: 3    SITagliptin (Januvia) 100 MG tablet, Take 1 tablet by mouth Daily., Disp: 90 tablet, Rfl: 3    vitamin D (ERGOCALCIFEROL) 1.25 MG (19636 UT) capsule capsule, TAKE 1 CAPSULE BY MOUTH ONE TIME PER WEEK, Disp: 12 capsule, Rfl: 3      PHYSICAL EXAM  Physical Exam  Constitutional:       Appearance: She is ill-appearing.   Cardiovascular:      Rate and Rhythm: Normal rate and regular rhythm.      Pulses: Normal pulses.      Heart sounds: Normal heart sounds.   Pulmonary:      Effort:  Pulmonary effort is normal.      Breath sounds: Normal breath sounds.   Musculoskeletal:         General: Deformity (spine) present.   Neurological:      Mental Status: She is alert.         LABS AND IMAGING  Office Visit on 02/21/2024   Component Date Value Ref Range Status    Glucose 02/21/2024 174 (A)  70 - 130 mg/dL Final    Lot Number 02/21/2024 [id number]   Final    Expiration Date 02/21/2024 10/05/2024   Final    Hemoglobin A1C 02/21/2024 6.2 (A)  4.5 - 5.7 % Final    Lot Number 02/21/2024 [id number]   Final    Expiration Date 02/21/2024 10/23/2023   Final           Assessment  Assessment & Plan   1. Controlled type 2 diabetes mellitus without complication, without long-term current use of insulin    2. Postablative hypothyroidism    3. Dunklin's disease    4. Vitamin D deficiency    5. Osteoporosis, unspecified osteoporosis type, unspecified pathological fracture presence        Orders Placed This Encounter   Procedures    Comprehensive Metabolic Panel    TSH    T4, Free    Vitamin D,25-Hydroxy    POC Glucose, Blood    POC Glycosylated Hemoglobin (Hb A1C)     Plan    1-Hydrocortisone dose: 20 mg in am and 5 mg in pm. Meds refilled.     2-HTN - lisinopril 5 mg daily. BP is high today, but she didn't take the medication.         3- DM 2 - Continue Januvia 100 mg daily,     4- Continue Levothyroxine 50 mcg a day. Repeat labs today.     5 -Cont weekly alendronate and high Vit D     Labs obtained today and meds refilled.     Follow-up in 4 months.

## 2024-02-22 ENCOUNTER — TELEPHONE (OUTPATIENT)
Dept: ENDOCRINOLOGY | Facility: CLINIC | Age: 69
End: 2024-02-22
Payer: MEDICARE

## 2024-02-22 LAB
25(OH)D3 SERPL-MCNC: 87 NG/ML (ref 30–100)
ALBUMIN SERPL-MCNC: 4.3 G/DL (ref 3.5–5.2)
ALBUMIN/GLOB SERPL: 1.7 G/DL
ALP SERPL-CCNC: 68 U/L (ref 39–117)
ALT SERPL W P-5'-P-CCNC: 11 U/L (ref 1–33)
ANION GAP SERPL CALCULATED.3IONS-SCNC: 13.4 MMOL/L (ref 5–15)
AST SERPL-CCNC: 22 U/L (ref 1–32)
BILIRUB SERPL-MCNC: 0.5 MG/DL (ref 0–1.2)
BUN SERPL-MCNC: 13 MG/DL (ref 8–23)
BUN/CREAT SERPL: 13.7 (ref 7–25)
CALCIUM SPEC-SCNC: 9.3 MG/DL (ref 8.6–10.5)
CHLORIDE SERPL-SCNC: 105 MMOL/L (ref 98–107)
CO2 SERPL-SCNC: 23.6 MMOL/L (ref 22–29)
CREAT SERPL-MCNC: 0.95 MG/DL (ref 0.57–1)
EGFRCR SERPLBLD CKD-EPI 2021: 65 ML/MIN/1.73
GLOBULIN UR ELPH-MCNC: 2.5 GM/DL
GLUCOSE SERPL-MCNC: 153 MG/DL (ref 65–99)
POTASSIUM SERPL-SCNC: 4.4 MMOL/L (ref 3.5–5.2)
PROT SERPL-MCNC: 6.8 G/DL (ref 6–8.5)
SODIUM SERPL-SCNC: 142 MMOL/L (ref 136–145)
T4 FREE SERPL-MCNC: 0.88 NG/DL (ref 0.93–1.7)
TSH SERPL DL<=0.05 MIU/L-ACNC: 1.04 UIU/ML (ref 0.27–4.2)

## 2024-02-22 NOTE — TELEPHONE ENCOUNTER
[facility] staff attempted to follow warm transfer process and was unsuccessful     Caller: SAMINA PRATT    Relationship to patient: SELF    Best call back number: [phone]    Patient is needing: PATIENT IS HAVING UTI SYMPTOMS. BURNING WITH GOING TO THE BATHROOM. WOULD LIKE A MEDICATION CALLED IN.

## 2024-02-29 NOTE — TELEPHONE ENCOUNTER
I would recommend to go to the urgent care to check urinalysis and urine culture. It can help with sensitivity and right antibiotics.

## 2024-03-29 NOTE — TELEPHONE ENCOUNTER
Pt notified, and she is nearly out of Xanax, due for refills, was sent in Oct with 5 refills. Please send in one month supply. She was transferred up front to schedule appt.   (3) adequate

## 2024-04-05 DIAGNOSIS — J30.2 SEASONAL ALLERGIC RHINITIS: ICD-10-CM

## 2024-04-05 NOTE — TELEPHONE ENCOUNTER
Rx Refill Note  Requested Prescriptions     Pending Prescriptions Disp Refills    montelukast (SINGULAIR) 10 MG tablet [Pharmacy Med Name: montelukast 10 mg tablet] 90 tablet 0     Sig: TAKE 1 TABLET BY MOUTH AT BEDTIME.      Last office visit with prescribing clinician: 2/21/2024   Last telemedicine visit with prescribing clinician: Visit date not found   Next office visit with prescribing clinician: Visit date not found                         Would you like a call back once the refill request has been completed: [] Yes [] No    If the office needs to give you a call back, can they leave a voicemail: [] Yes [] No    Nevin Stafford MA  04/05/24, 08:10 EDT

## 2024-04-08 RX ORDER — MONTELUKAST SODIUM 10 MG/1
10 TABLET ORAL
Qty: 90 TABLET | Refills: 0 | Status: SHIPPED | OUTPATIENT
Start: 2024-04-08

## 2024-04-18 ENCOUNTER — TELEPHONE (OUTPATIENT)
Dept: ENDOCRINOLOGY | Facility: CLINIC | Age: 69
End: 2024-04-18
Payer: MEDICARE

## 2024-04-18 RX ORDER — CYANOCOBALAMIN 1000 UG/ML
INJECTION, SOLUTION INTRAMUSCULAR; SUBCUTANEOUS
Qty: 1 ML | Refills: 0 | Status: SHIPPED | OUTPATIENT
Start: 2024-04-18

## 2024-04-18 NOTE — TELEPHONE ENCOUNTER
"Patient called stated she fells really bad and is needing her \"shot\" for zari's disease. She has not idea what it's called but needs Dr. Morales to send it in? And wants to know when she should go there to have it done?     She proceeded to say she felt like she was going to pass out. I explained if she felt she was going to pass out she needs to call 911 or get to the nearest ER if possible. She verbalized understanding.     She then said she would wait on Dr. Morales to call in this \"shot\" for her zari's.     Please advise.    "

## 2024-04-18 NOTE — TELEPHONE ENCOUNTER
She is probably talking of the hydrocortisone injections. I can send it to her pharmacy if that's what she is requesting. This medication is not covered regularly and may take few days to get it approved.  It is used as an emergency in the event she is not able to keep PO hydrocortisone. Otherwise if she can keep PO it is best if she just takes 3x normal dose of the hydrocortisone.   If she has a symptoms of adrenal crisis she needs to go to the ER and they would give her hydrocortisone injection there.     (Please call pt and give her the info, if she still has questions you can transfer call to me and I will answer any remaining questions)

## 2024-05-06 RX ORDER — CYANOCOBALAMIN 1000 UG/ML
INJECTION, SOLUTION INTRAMUSCULAR; SUBCUTANEOUS
Qty: 1 ML | Refills: 0 | Status: SHIPPED | OUTPATIENT
Start: 2024-05-06

## 2024-05-20 DIAGNOSIS — E11.9 TYPE 2 DIABETES MELLITUS WITHOUT COMPLICATION, WITHOUT LONG-TERM CURRENT USE OF INSULIN: ICD-10-CM

## 2024-05-20 RX ORDER — LISINOPRIL 5 MG/1
5 TABLET ORAL DAILY
Qty: 90 TABLET | Refills: 3 | Status: SHIPPED | OUTPATIENT
Start: 2024-05-20

## 2024-05-20 NOTE — TELEPHONE ENCOUNTER
Rx Refill Note  Requested Prescriptions     Pending Prescriptions Disp Refills    lisinopril (PRINIVIL,ZESTRIL) 5 MG tablet [Pharmacy Med Name: lisinopril 5 mg tablet] 90 tablet 3     Sig: TAKE 1 TABLET BY MOUTH ONCE DAILY FOR BLOOD PRESSURE      Last office visit with prescribing clinician: 2/21/2024   Last telemedicine visit with prescribing clinician: Visit date not found   Next office visit with prescribing clinician: Visit date not found                         Would you like a call back once the refill request has been completed: [] Yes [] No    If the office needs to give you a call back, can they leave a voicemail: [] Yes [] No    Nevin Stafford MA  05/20/24, 08:51 EDT

## 2024-06-19 RX ORDER — CYANOCOBALAMIN 1000 UG/ML
INJECTION, SOLUTION INTRAMUSCULAR; SUBCUTANEOUS
Qty: 1 ML | Refills: 0 | Status: SHIPPED | OUTPATIENT
Start: 2024-06-19

## 2024-06-19 NOTE — TELEPHONE ENCOUNTER
LVM to return call to office # [phone]     FOR HUB: Please inform pt was to schedule follow up around 6.21.24. No follow up scheduled. Refill can be sent but will need to maintain appt for further refills

## 2024-06-19 NOTE — TELEPHONE ENCOUNTER
Prescription request for Cyanocobalamin 1000 mcg. Please send to [facility]

## 2024-07-26 RX ORDER — ALBUTEROL SULFATE 90 UG/1
2 AEROSOL, METERED RESPIRATORY (INHALATION) EVERY 4 HOURS PRN
Qty: 8.5 G | Refills: 1 | Status: SHIPPED | OUTPATIENT
Start: 2024-07-26

## 2024-07-26 RX ORDER — ALBUTEROL SULFATE 90 UG/1
2 AEROSOL, METERED RESPIRATORY (INHALATION) EVERY 4 HOURS PRN
Qty: 8.5 G | Refills: 1 | Status: CANCELLED | OUTPATIENT
Start: 2024-07-26

## 2024-07-26 NOTE — TELEPHONE ENCOUNTER
Rx Refill Note    Requested Prescriptions     Pending Prescriptions Disp Refills    albuterol sulfate  (90 Base) MCG/ACT inhaler [Pharmacy Med Name: albuterol sulfate HFA 90 mcg/actuation aerosol inhaler] 8.5 g 1     Sig: Inhale 2 puffs Every 4 (Four) Hours As Needed for Wheezing.

## 2024-07-29 RX ORDER — CYANOCOBALAMIN 1000 UG/ML
INJECTION, SOLUTION INTRAMUSCULAR; SUBCUTANEOUS
Qty: 1 ML | Refills: 0 | Status: SHIPPED | OUTPATIENT
Start: 2024-07-29

## 2024-08-13 DIAGNOSIS — E11.9 TYPE 2 DIABETES MELLITUS WITHOUT COMPLICATION, WITHOUT LONG-TERM CURRENT USE OF INSULIN: ICD-10-CM

## 2024-08-13 NOTE — TELEPHONE ENCOUNTER
Rx Refill Note  Requested Prescriptions     Pending Prescriptions Disp Refills    Januvia 100 MG tablet [Pharmacy Med Name: Januvia 100 mg tablet] 90 tablet 3     Sig: TAKE 1 TABLET BY MOUTH DAILY.        Last office visit with prescribing clinician: 2/21/2024      Next office visit with prescribing clinician: 8/16/24      Sadia Doran (Jodi)  08/13/24, 15:20 EDT

## 2024-08-14 RX ORDER — SITAGLIPTIN 100 MG/1
100 TABLET, FILM COATED ORAL DAILY
Qty: 90 TABLET | Refills: 0 | Status: SHIPPED | OUTPATIENT
Start: 2024-08-14

## 2024-08-21 ENCOUNTER — TELEPHONE (OUTPATIENT)
Dept: ENDOCRINOLOGY | Facility: CLINIC | Age: 69
End: 2024-08-21
Payer: MEDICARE

## 2024-08-21 NOTE — TELEPHONE ENCOUNTER
Needs shot called into pharmacy at [phone]  [facility] pharmacy does the injection

## 2024-08-24 DIAGNOSIS — E27.1 ADDISON'S DISEASE: Chronic | ICD-10-CM

## 2024-08-26 NOTE — TELEPHONE ENCOUNTER
Rx Refill Note  Requested Prescriptions     Pending Prescriptions Disp Refills    hydrocortisone (CORTEF) 5 MG tablet [Pharmacy Med Name: hydrocortisone 5 mg tablet] 150 tablet 3     Sig: TAKE 4 TABLETS BY MOUTH EVERY MORNING and take 1 tablet BY MOUTH daily at 3 pm( take double dose in the event of illness)      Last office visit with prescribing clinician: 2/21/2024     Next office visit with prescribing clinician: 1/8/2025                           Zahra Killian MA  08/26/24, 14:10 EDT Rx Refill Note  Requested Prescriptions     Pending Prescriptions Disp Refills    hydrocortisone (CORTEF) 5 MG tablet [Pharmacy Med Name: hydrocortisone 5 mg tablet] 150 tablet 3     Sig: TAKE 4 TABLETS BY MOUTH EVERY MORNING and take 1 tablet BY MOUTH daily at 3 pm( take double dose in the event of illness)      Last office visit with prescribing clinician: 2/21/2024     Next office visit with prescribing clinician: 1/8/2025                           Zahra Killian MA  08/26/24, 14:10 EDT

## 2024-08-26 NOTE — TELEPHONE ENCOUNTER
Rx Refill Note  Requested Prescriptions     Pending Prescriptions Disp Refills    cyanocobalamin 1000 MCG/ML injection [Pharmacy Med Name: cyanocobalamin (vit B-12) 1,000 mcg/mL injection solution] 1 mL 0     Sig: INJECT 1ML INTRAMUSCULARLY EVERY TWO WEEKS AS DIRECTED      Last office visit with prescribing clinician: 2/21/2024     Next office visit with prescribing clinician: 01/08/2025                          Zahra Killian MA  08/26/24, 14:08 EDT

## 2024-08-27 RX ORDER — CYANOCOBALAMIN 1000 UG/ML
INJECTION, SOLUTION INTRAMUSCULAR; SUBCUTANEOUS
Qty: 1 ML | Refills: 0 | Status: SHIPPED | OUTPATIENT
Start: 2024-08-27

## 2024-08-27 RX ORDER — HYDROCORTISONE 5 MG/1
TABLET ORAL
Qty: 150 TABLET | Refills: 3 | Status: SHIPPED | OUTPATIENT
Start: 2024-08-27

## 2024-09-29 DIAGNOSIS — E78.2 MIXED HYPERLIPIDEMIA: ICD-10-CM

## 2024-09-30 RX ORDER — SIMVASTATIN 20 MG
20 TABLET ORAL
Qty: 90 TABLET | Refills: 3 | Status: SHIPPED | OUTPATIENT
Start: 2024-09-30

## 2024-09-30 NOTE — TELEPHONE ENCOUNTER
Rx Refill Note  Requested Prescriptions     Pending Prescriptions Disp Refills    simvastatin (ZOCOR) 20 MG tablet [Pharmacy Med Name: simvastatin 20 mg tablet] 90 tablet 3     Sig: TAKE 1 TABLET BY MOUTH EVERY NIGHT AT BEDTIME.      Last office visit with prescribing clinician: 2/21/2024   Last telemedicine visit with prescribing clinician: Visit date not found   Next office visit with prescribing clinician: 1/8/2025                         Would you like a call back once the refill request has been completed: [] Yes [] No    If the office needs to give you a call back, can they leave a voicemail: [] Yes [] No    Nevin Stafford MA  09/30/24, 08:39 EDT

## 2024-11-01 NOTE — TELEPHONE ENCOUNTER
Rx Refill Note  Requested Prescriptions     Pending Prescriptions Disp Refills    cyanocobalamin 1000 MCG/ML injection [Pharmacy Med Name: cyanocobalamin (vit B-12) 1,000 mcg/mL injection solution] 1 mL 0     Sig: INJECT 1ML INTRAMUSCULARLY EVERY TWO WEEKS AS DIRECTED      Last office visit with prescribing clinician: 2/21/2024   Last telemedicine visit with prescribing clinician: Visit date not found   Next office visit with prescribing clinician: 1/8/2025                         Would you like a call back once the refill request has been completed: [] Yes [] No    If the office needs to give you a call back, can they leave a voicemail: [] Yes [] No    Nevin Stafford MA  11/01/24, 16:00 EDT

## 2024-11-02 RX ORDER — CYANOCOBALAMIN 1000 UG/ML
INJECTION, SOLUTION INTRAMUSCULAR; SUBCUTANEOUS
Qty: 1 ML | Refills: 0 | Status: SHIPPED | OUTPATIENT
Start: 2024-11-02

## 2024-12-10 DIAGNOSIS — E11.9 TYPE 2 DIABETES MELLITUS WITHOUT COMPLICATION, WITHOUT LONG-TERM CURRENT USE OF INSULIN: ICD-10-CM

## 2024-12-10 RX ORDER — SITAGLIPTIN 100 MG/1
100 TABLET, FILM COATED ORAL DAILY
Qty: 90 TABLET | Refills: 0 | Status: SHIPPED | OUTPATIENT
Start: 2024-12-10

## 2024-12-10 NOTE — TELEPHONE ENCOUNTER
Rx Refill Note  Requested Prescriptions     Pending Prescriptions Disp Refills    Januvia 100 MG tablet [Pharmacy Med Name: Januvia 100 mg tablet] 30 tablet 2     Sig: TAKE 1 TABLET BY MOUTH DAILY.      Last office visit with prescribing clinician: 2/21/2024     Next office visit with prescribing clinician: 1/8/2025

## 2024-12-17 DIAGNOSIS — E27.1 ADDISON'S DISEASE: Chronic | ICD-10-CM

## 2024-12-18 RX ORDER — HYDROCORTISONE 5 MG/1
TABLET ORAL
Qty: 150 TABLET | Refills: 3 | Status: SHIPPED | OUTPATIENT
Start: 2024-12-18

## 2025-01-22 RX ORDER — CYANOCOBALAMIN 1000 UG/ML
INJECTION, SOLUTION INTRAMUSCULAR; SUBCUTANEOUS
Qty: 1 ML | Refills: 0 | OUTPATIENT
Start: 2025-01-22

## 2025-01-22 NOTE — TELEPHONE ENCOUNTER
Rx Refill Note  Requested Prescriptions     Pending Prescriptions Disp Refills    cyanocobalamin 1000 MCG/ML injection [Pharmacy Med Name: cyanocobalamin (vit B-12) 1,000 mcg/mL injection solution] 1 mL 0     Sig: INJECT 1ML INTRAMUSCULARLY EVERY TWO WEEKS AS DIRECTED      Last office visit with prescribing clinician: 2/21/2024   Last telemedicine visit with prescribing clinician: Visit date not found   Next office visit with prescribing clinician: Visit date not found                         Would you like a call back once the refill request has been completed: [] Yes [] No    If the office needs to give you a call back, can they leave a voicemail: [] Yes [] No    Keke Mauricio MA  01/22/25, 14:49 EST

## 2025-01-29 ENCOUNTER — TELEPHONE (OUTPATIENT)
Dept: ENDOCRINOLOGY | Facility: CLINIC | Age: 70
End: 2025-01-29
Payer: MEDICARE

## 2025-01-29 ENCOUNTER — TELEPHONE (OUTPATIENT)
Dept: ENDOCRINOLOGY | Facility: CLINIC | Age: 70
End: 2025-01-29

## 2025-01-29 NOTE — TELEPHONE ENCOUNTER
Last office visit with prescribing clinician: 2-21-24      Next office visit with prescribing clinician: no appt scheduled    {

## 2025-01-29 NOTE — TELEPHONE ENCOUNTER
Caller: Karen Rubio    Relationship: Self    Best call back number: [phone]    Requested Prescriptions:   Requested Prescriptions      No prescriptions requested or ordered in this encounter    GILMAR GORDILLO, SIMVASTATIN, CYANOCOBALAMIN*    Pharmacy where request should be sent:    [facility].  *CYANOCOBALAMIN TO BE CALLED IN TO [facility] FOR INJECTION     Does the patient have less than a 3 day supply:  [x] Yes  [] No    Would you like a call back once the refill request has been completed: [x] Yes [] No    If the office needs to give you a call back, can they leave a voicemail: [x] Yes [] No    Clay Travis Rep   01/29/25 15:23 EST

## 2025-01-29 NOTE — TELEPHONE ENCOUNTER
Caller: Karen Rubio    Relationship to patient: Self    Best call back number: [phone]    Patient is needing: PT REQUESTS CALLBACK FROM DR GUZMAN TO DISCUSS RECENT/DEVELOPING SYMPTOMS. PLEASE CALL TO ADVISE.

## 2025-01-29 NOTE — TELEPHONE ENCOUNTER
Left message for the pt to call and schedule appt for refills. And that Cortef was sent to phar 12-18-24 for 1 yr.

## 2025-01-30 NOTE — TELEPHONE ENCOUNTER
PT RETURNED CALL, ADVISED THAT THE BEST NUMBER TO CALL [phone]. SHE IS AWARE THAT THIS IS HER FRIENDS NUMBER, THAT IS THE NUMBER SHE INTENDS FOR YOU TO CALL. PLEASE CALL BACK WHEN NEXT AVAILABLE.

## 2025-01-30 NOTE — TELEPHONE ENCOUNTER
Called the phone number provided and it was the pts friend.    She said to call number listed. Listed number is not correct.

## 2025-02-03 NOTE — TELEPHONE ENCOUNTER
Pt called to say that she needs her zari's shot -she was not sure of the name but she is getting really weak    Please call pt back  [phone]

## 2025-02-03 NOTE — TELEPHONE ENCOUNTER
Please return to the ER for any new or worsening symptoms including but not limited to Fever, eye pain, or redness blurry or double vision  If prescribed, please be sure to  your prescriptions from the pharmacy  Please follow-up with Primary care provider and the dental clinic  as instructed     Rx Refill Note  Requested Prescriptions     Pending Prescriptions Disp Refills    cyanocobalamin 1000 MCG/ML injection [Pharmacy Med Name: cyanocobalamin (vit B-12) 1,000 mcg/mL injection solution] 1 mL 0     Sig: INJECT 1ML INTRAMUSCULARLY EVERY TWO WEEKS AS DIRECTED      Last office visit with prescribing clinician: 2/21/2024     Next office visit with prescribing clinician: 2/14/2025       Rachna Ramirez  02/03/25, 12:44 EST

## 2025-02-04 RX ORDER — CYANOCOBALAMIN 1000 UG/ML
INJECTION, SOLUTION INTRAMUSCULAR; SUBCUTANEOUS
Qty: 1 ML | Refills: 0 | Status: SHIPPED | OUTPATIENT
Start: 2025-02-04

## 2025-02-14 ENCOUNTER — OFFICE VISIT (OUTPATIENT)
Dept: ENDOCRINOLOGY | Facility: CLINIC | Age: 70
End: 2025-02-14
Payer: MEDICARE

## 2025-02-14 VITALS
HEART RATE: 63 BPM | SYSTOLIC BLOOD PRESSURE: 142 MMHG | BODY MASS INDEX: 24.11 KG/M2 | DIASTOLIC BLOOD PRESSURE: 64 MMHG | WEIGHT: 104.2 LBS | OXYGEN SATURATION: 98 % | HEIGHT: 55 IN

## 2025-02-14 DIAGNOSIS — E11.9 TYPE 2 DIABETES MELLITUS WITHOUT COMPLICATION, WITHOUT LONG-TERM CURRENT USE OF INSULIN: Primary | ICD-10-CM

## 2025-02-14 DIAGNOSIS — E55.9 VITAMIN D DEFICIENCY: ICD-10-CM

## 2025-02-14 DIAGNOSIS — J30.2 SEASONAL ALLERGIC RHINITIS: ICD-10-CM

## 2025-02-14 DIAGNOSIS — E78.2 MIXED HYPERLIPIDEMIA: ICD-10-CM

## 2025-02-14 DIAGNOSIS — E89.0 POSTABLATIVE HYPOTHYROIDISM: Chronic | ICD-10-CM

## 2025-02-14 DIAGNOSIS — E27.1 ADDISON'S DISEASE: Chronic | ICD-10-CM

## 2025-02-14 LAB
25(OH)D3 SERPL-MCNC: 65.1 NG/ML (ref 30–100)
ALBUMIN SERPL-MCNC: 4.1 G/DL (ref 3.5–5.2)
ALBUMIN/GLOB SERPL: 1.8 G/DL
ALP SERPL-CCNC: 60 U/L (ref 39–117)
ALT SERPL W P-5'-P-CCNC: 12 U/L (ref 1–33)
ANION GAP SERPL CALCULATED.3IONS-SCNC: 9.3 MMOL/L (ref 5–15)
AST SERPL-CCNC: 20 U/L (ref 1–32)
BILIRUB SERPL-MCNC: 0.6 MG/DL (ref 0–1.2)
BUN SERPL-MCNC: 12 MG/DL (ref 8–23)
BUN/CREAT SERPL: 12.6 (ref 7–25)
CALCIUM SPEC-SCNC: 9.3 MG/DL (ref 8.6–10.5)
CHLORIDE SERPL-SCNC: 109 MMOL/L (ref 98–107)
CHOLEST SERPL-MCNC: 180 MG/DL (ref 0–200)
CO2 SERPL-SCNC: 25.7 MMOL/L (ref 22–29)
CREAT SERPL-MCNC: 0.95 MG/DL (ref 0.57–1)
EGFRCR SERPLBLD CKD-EPI 2021: 64.6 ML/MIN/1.73
EXPIRATION DATE: ABNORMAL
EXPIRATION DATE: ABNORMAL
GLOBULIN UR ELPH-MCNC: 2.3 GM/DL
GLUCOSE BLDC GLUCOMTR-MCNC: 168 MG/DL (ref 70–130)
GLUCOSE SERPL-MCNC: 122 MG/DL (ref 65–99)
HBA1C MFR BLD: 6.1 % (ref 4.5–5.7)
HDLC SERPL-MCNC: 65 MG/DL (ref 40–60)
LDLC SERPL CALC-MCNC: 102 MG/DL (ref 0–100)
LDLC/HDLC SERPL: 1.56 {RATIO}
Lab: ABNORMAL
Lab: ABNORMAL
POTASSIUM SERPL-SCNC: 4.6 MMOL/L (ref 3.5–5.2)
PROT SERPL-MCNC: 6.4 G/DL (ref 6–8.5)
SODIUM SERPL-SCNC: 144 MMOL/L (ref 136–145)
T4 FREE SERPL-MCNC: 1.23 NG/DL (ref 0.92–1.68)
TRIGL SERPL-MCNC: 67 MG/DL (ref 0–150)
TSH SERPL DL<=0.05 MIU/L-ACNC: 0.1 UIU/ML (ref 0.27–4.2)
VLDLC SERPL-MCNC: 13 MG/DL (ref 5–40)

## 2025-02-14 PROCEDURE — 84439 ASSAY OF FREE THYROXINE: CPT | Performed by: INTERNAL MEDICINE

## 2025-02-14 PROCEDURE — 82306 VITAMIN D 25 HYDROXY: CPT | Performed by: INTERNAL MEDICINE

## 2025-02-14 PROCEDURE — 80053 COMPREHEN METABOLIC PANEL: CPT | Performed by: INTERNAL MEDICINE

## 2025-02-14 PROCEDURE — 80061 LIPID PANEL: CPT | Performed by: INTERNAL MEDICINE

## 2025-02-14 PROCEDURE — 84443 ASSAY THYROID STIM HORMONE: CPT | Performed by: INTERNAL MEDICINE

## 2025-02-14 RX ORDER — LEVOTHYROXINE SODIUM 50 UG/1
50 TABLET ORAL DAILY
Qty: 90 TABLET | Refills: 3 | Status: SHIPPED | OUTPATIENT
Start: 2025-02-14 | End: 2025-02-16 | Stop reason: SDUPTHER

## 2025-02-14 RX ORDER — CETIRIZINE HYDROCHLORIDE 10 MG/1
10 TABLET ORAL DAILY
Qty: 90 TABLET | Refills: 3 | Status: SHIPPED | OUTPATIENT
Start: 2025-02-14

## 2025-02-14 RX ORDER — MONTELUKAST SODIUM 10 MG/1
10 TABLET ORAL
Qty: 90 TABLET | Refills: 3 | Status: SHIPPED | OUTPATIENT
Start: 2025-02-14

## 2025-02-14 RX ORDER — ERGOCALCIFEROL 1.25 MG/1
CAPSULE, LIQUID FILLED ORAL
Qty: 12 CAPSULE | Refills: 3 | Status: SHIPPED | OUTPATIENT
Start: 2025-02-14

## 2025-02-14 RX ORDER — SIMVASTATIN 20 MG
20 TABLET ORAL
Qty: 90 TABLET | Refills: 3 | Status: SHIPPED | OUTPATIENT
Start: 2025-02-14

## 2025-02-14 RX ORDER — ERGOCALCIFEROL 1.25 MG/1
CAPSULE, LIQUID FILLED ORAL
Qty: 12 CAPSULE | Refills: 3 | Status: SHIPPED | OUTPATIENT
Start: 2025-02-14 | End: 2025-02-14 | Stop reason: SDUPTHER

## 2025-02-14 RX ORDER — LISINOPRIL 5 MG/1
5 TABLET ORAL DAILY
Qty: 90 TABLET | Refills: 3 | Status: SHIPPED | OUTPATIENT
Start: 2025-02-14

## 2025-02-14 RX ORDER — HYDROCORTISONE 5 MG/1
TABLET ORAL
Qty: 150 TABLET | Refills: 10 | Status: SHIPPED | OUTPATIENT
Start: 2025-02-14

## 2025-02-14 NOTE — PROGRESS NOTES
"Chief complaint  Diabetes (Type II Diabetes ), Vitamin D Deficiency, and Addisons (Shawn's/)    Subjective   Karen Rubio is a 69 y.o. female is here today for follow-up.  Follow-up for Hypothyroidism and Shawn's disease.   Patient has multiple medical conditions: HTN, Depression, Diabetes mellitus.   Adrenal insufficiency / Shawn's disease dx in 2004. She is taking hydrocortisone and fludrocortisone.                                                                                                                      Diabetes mellitus type 2   On Januvia 100  mg daily.     Hypothyroidism postablative. S/p I-131 therapy at age 14. She is taking levothyroxine 50 mcg.       HTN - BP is normal. She is taking lisinopril.     She reported feeling depressed, having lack of motivation, severe back pain. She doesn't leave the house most of the days. She came to the appointment with her friend who helps her. Pt reported depressed mood, she feels isolated and lonely, feels that her family and friends are not talking to her.   She also has severe back pain and difficulty walking. She may need a back brace, advised to discuss with PCP>   Her PCP is our of network and she doesn't know how to get a new one.     Medications.    Current Outpatient Medications:     albuterol sulfate  (90 Base) MCG/ACT inhaler, INHALE 2 PUFFS EVERY 4 (FOUR) HOURS AS NEEDED FOR WHEEZING., Disp: 8.5 g, Rfl: 1    ALPRAZolam (XANAX) 1 MG tablet, Take 1 tablet by mouth 3 (Three) Times a Day As Needed for Anxiety., Disp: 90 tablet, Rfl: 5    B-D 3CC LUER-JELLY SYR 25GX1\" 25G X 1\" 3 ML misc, AS DIRECTED WITH B12 INJECTIONS, Disp: 4 each, Rfl: 3    Belbuca 75 MCG film, , Disp: , Rfl:     carboxymethylcellulose (Lubricant Eye Drops) 0.5 % solution, Administer 1 drop to both eyes 3 (Three) Times a Day As Needed for Dry Eyes., Disp: 30 mL, Rfl: 11    cetirizine (zyrTEC) 10 MG tablet, Take 1 tablet by mouth Daily., Disp: 90 tablet, Rfl: 3    " cyanocobalamin 1000 MCG/ML injection, INJECT 1ML INTRAMUSCULARLY EVERY TWO WEEKS AS DIRECTED, Disp: 1 mL, Rfl: 0    fluticasone (FLONASE) 50 MCG/ACT nasal spray, 1 spray into the nostril(s) as directed by provider Daily., Disp: 1 g, Rfl: 2    glucose blood test strip, Test BG TID, Disp: 300 each, Rfl: 3    glucose monitor monitoring kit, Test BG TID, Disp: 1 each, Rfl: 0    hydrocortisone (CORTEF) 5 MG tablet, TAKE 4 TABLETS BY MOUTH EVERY MORNING and take 1 tablet BY MOUTH daily at 3 pm( take double dose in the event of illness), Disp: 150 tablet, Rfl: 10    lactulose (CHRONULAC) 10 GM/15ML solution, , Disp: , Rfl:     Lancets misc, Test BG TID, Disp: 300 each, Rfl: 3    levothyroxine (SYNTHROID, LEVOTHROID) 50 MCG tablet, Take 1 tablet by mouth Daily., Disp: 90 tablet, Rfl: 3    lisinopril (PRINIVIL,ZESTRIL) 5 MG tablet, Take 1 tablet by mouth Daily. for blood pressure, Disp: 90 tablet, Rfl: 3    montelukast (SINGULAIR) 10 MG tablet, Take 1 tablet by mouth every night at bedtime., Disp: 90 tablet, Rfl: 3    ondansetron ODT (Zofran ODT) 8 MG disintegrating tablet, Place 1 tablet on the tongue Every 8 (Eight) Hours As Needed for Nausea or Vomiting., Disp: 15 tablet, Rfl: 2    oxyCODONE-acetaminophen (PERCOCET)  MG per tablet, Take 1 tablet by mouth Every 4 (Four) Hours As Needed for Moderate Pain ., Disp: 150 tablet, Rfl: 0    simvastatin (ZOCOR) 20 MG tablet, Take 1 tablet by mouth every night at bedtime., Disp: 90 tablet, Rfl: 3    SITagliptin (Januvia) 100 MG tablet, Take 1 tablet by mouth Daily., Disp: 90 tablet, Rfl: 3    vitamin D (ERGOCALCIFEROL) 1.25 MG (20395 UT) capsule capsule, TAKE 1 CAPSULE BY MOUTH ONE TIME PER WEEK, Disp: 12 capsule, Rfl: 3      PHYSICAL EXAM  Physical Exam  Constitutional:       Appearance: She is ill-appearing.   Cardiovascular:      Rate and Rhythm: Normal rate and regular rhythm.      Pulses: Normal pulses.      Heart sounds: Normal heart sounds.   Pulmonary:      Effort:  Pulmonary effort is normal.      Breath sounds: Normal breath sounds.   Musculoskeletal:         General: Deformity (spine) present.   Neurological:      Mental Status: She is alert.         LABS AND IMAGING  Office Visit on 02/14/2025   Component Date Value Ref Range Status    Glucose 02/14/2025 168 (A)  70 - 130 mg/dL Final    Lot Number 02/14/2025 [id number]   Final    Expiration Date 02/14/2025 08/09/2025   Final    Hemoglobin A1C 02/14/2025 6.1 (A)  4.5 - 5.7 % Final    Lot Number 02/14/2025 [id number]   Final    Expiration Date 02/14/2025 10/18/2026   Final           Assessment  Assessment & Plan   1. Type 2 diabetes mellitus without complication, without long-term current use of insulin    2. Bowie's disease    3. Postablative hypothyroidism    4. Vitamin D deficiency    5. Mixed hyperlipidemia    6. Seasonal allergic rhinitis        Orders Placed This Encounter   Procedures    Comprehensive Metabolic Panel    Lipid Panel    TSH    T4, Free    Vitamin D,25-Hydroxy    POC Glucose, Blood    POC Glycosylated Hemoglobin (Hb A1C)     Plan    1-Hydrocortisone dose: 20 mg in am and 5 mg in pm. Meds refilled.     2-HTN - lisinopril 5 mg daily. BP is high today, but she didn't take the medication.         3- DM 2 - Continue Januvia 100 mg daily, A1C is normal.     4- Continue Levothyroxine 50 mcg a day. Repeat labs today.     5 -Cont weekly alendronate and high Vit D     Labs obtained today and meds refilled. I have given her a phone number for her insurance network to find a new in -network PCP. She also needs to establish with therapy.     Follow-up in 4 months.

## 2025-02-16 DIAGNOSIS — E89.0 POSTABLATIVE HYPOTHYROIDISM: Chronic | ICD-10-CM

## 2025-02-16 RX ORDER — ALENDRONATE SODIUM 70 MG/1
70 TABLET ORAL
Qty: 12 TABLET | Refills: 3 | Status: SHIPPED | OUTPATIENT
Start: 2025-02-16 | End: 2026-02-16

## 2025-02-16 RX ORDER — LEVOTHYROXINE SODIUM 25 UG/1
25 TABLET ORAL DAILY
Qty: 90 TABLET | Refills: 3 | Status: SHIPPED | OUTPATIENT
Start: 2025-02-16

## 2025-03-21 ENCOUNTER — TELEPHONE (OUTPATIENT)
Dept: ENDOCRINOLOGY | Facility: CLINIC | Age: 70
End: 2025-03-21

## 2025-03-21 NOTE — TELEPHONE ENCOUNTER
THIS PROVIDER W/ PAIN MANAGEMENT CALLED REQUESTING A REFERRAL TO PALLIATIVE CARE FOR THIS PT. SHE STATED TODAY WAS HER FIRST TIME SEEING THIS PT. SHE STATED SHE WOULD ALSO CONTACT PT'S PCP. I TOLD HER WE HAVE DR NEGRON ON FILE.

## 2025-03-24 ENCOUNTER — TELEPHONE (OUTPATIENT)
Dept: FAMILY MEDICINE CLINIC | Facility: CLINIC | Age: 70
End: 2025-03-24
Payer: MEDICARE

## 2025-03-24 NOTE — TELEPHONE ENCOUNTER
CRISTIANO SAAVEDRA FROM PAIN MANAGEMENT CALLED AND SAID SHE SAW SAMINA ON 3/21/25 AT PAIN MANAGEMENT, THIS WAS HER FIRST TIME SEEING HER, SHE NORMALLY SEES DR MCMAHON. SHE STATED THAT SAMINA WAS HAVING SEVERE BACK PAIN AND TOLD HER THAT SHE WAS TREATED FOR OVARIAN CANCER IN THE PAST AND THOUGHT THAT WAS WHERE THE PAIN WAS RADIATING FROM. DR SAAVEDRA WAS VERY CONCERNED FOR HER, SUGGESTED THAT SHE GO TO THE ER BECAUSE SHE COULD BARELY SIT UP OR WALK, SAMINA TOLD HER THAT SHE COULDN'T GET A RIDE TO THE ER AND DIDN'T WANT TO GO BY EMS. SAMINA THEN TOLD HER THAT SHE MISSED A LOT OF VISITS BECAUSE SHE DID NOT HAVE ANYONE TO HELP HER THAT SHE HAD RECENTLY LOST HER , MOTHER AND SISTER AND NOW SHE LIVED ALONE. DR SAAVEDRA SUGGESTED SHE HAVE A REFERRAL PUT IN FOR PALLIATIVE CARE, [facility].     PLEASE ADVISE

## 2025-03-24 NOTE — TELEPHONE ENCOUNTER
To the best of my knowledge she does not have terminal cancer unless this has just been a new discovery.

## 2025-03-25 NOTE — TELEPHONE ENCOUNTER
I have not seen Karen in the office since 2023.  The last CT scan of abdomen and pelvis that was done at  by the GYN service was in 2021 and showed no progression of ovarian cancer.  I do not see where she has had any other scans since then.  I am not aware that she has had any recurrence of her ovarian cancer.

## 2025-03-25 NOTE — TELEPHONE ENCOUNTER
I am not aware on any abnormal tests to suggest cancer progression. Perhaps they recommended it based on her multiple chronic conditions.

## 2025-06-13 ENCOUNTER — TELEPHONE (OUTPATIENT)
Dept: ENDOCRINOLOGY | Facility: CLINIC | Age: 70
End: 2025-06-13
Payer: MEDICARE

## 2025-06-13 NOTE — TELEPHONE ENCOUNTER
Patient called the office very distraught because she is having severe weight loss, poor appetite and severe fatigue. She requested Dr. Morales order her shot she use to give her for her Addisons disease. She states she has been under a lot of stress due to deaths in her family and going through chemo. Advised I do not see any injections that we gave or prescribed besides vit B12 but would send to Dr. Morales to address. Please address and reach out to pt before the end of day, she is very worried.

## 2025-06-15 RX ORDER — HYDROCORTISONE SODIUM SUCCINATE 100 MG/2ML
50 INJECTION INTRAMUSCULAR; INTRAVENOUS EVERY 8 HOURS
Qty: 3 EACH | Refills: 3 | Status: SHIPPED | OUTPATIENT
Start: 2025-06-15

## 2025-06-15 NOTE — TELEPHONE ENCOUNTER
Chemotherapy puts a stress to the body and I would advise to increase the dose of her hydrocortisone - use double the dose of hydrocortisone when she doesn't feel well and reduce to 20 mg and 10 mg once she is feeling better. I will send prescription for the solucortef injection, but this is used in the event of severe illness and not being able to tolerate PO. 2- 3 times increase in hydrocortisone dose would be more effective measure to fight Addisons disease sx if she can take PO

## 2025-06-16 NOTE — TELEPHONE ENCOUNTER
Hub staff attempted to follow warm transfer process and was unsuccessful     Caller: ERNESTINE ISRAEL    Relationship to patient: PHARMACY    Best call back number: [phone]    Patient is needing: PHARMACY IS CALLING BECAUSE THEY CAN NOT GET THIS MEDICATION HYDROCORTISONE IN STOCK OR ANY AVAILABLE AT THIS TIME. PLEASE ADVISE

## 2025-07-22 ENCOUNTER — TELEPHONE (OUTPATIENT)
Dept: ENDOCRINOLOGY | Facility: CLINIC | Age: 70
End: 2025-07-22
Payer: MEDICARE

## 2025-07-22 RX ORDER — HYDROCORTISONE SODIUM SUCCINATE 100 MG/2ML
50 INJECTION INTRAMUSCULAR; INTRAVENOUS EVERY 8 HOURS
Qty: 3 EACH | Refills: 3 | Status: SHIPPED | OUTPATIENT
Start: 2025-07-22 | End: 2025-07-24 | Stop reason: SDUPTHER

## 2025-07-22 NOTE — TELEPHONE ENCOUNTER
Pt called prescription was sent to [facility] pharmacy on 06/15/25. Please send Hydrocortisone sod 100 mg injection to [facility] in [place], [place]

## 2025-07-22 NOTE — TELEPHONE ENCOUNTER
Last office visit with prescribing clinician: 2/14/2025       Next office visit with prescribing clinician: Visit date not found     {

## 2025-07-22 NOTE — TELEPHONE ENCOUNTER
PT CALLED STATING SHE HASN'T HAD HER HYDROCORTISONE INJECTION IN A FEW MONTHS. SHE STATED [facility] CANNOT GET IT IN STOCK. SHE REQUESTED WE LOOK INTO THIS AND REACH OUT TO HER.

## 2025-07-24 RX ORDER — HYDROCORTISONE SODIUM SUCCINATE 100 MG/2ML
50 INJECTION INTRAMUSCULAR; INTRAVENOUS EVERY 8 HOURS
Qty: 3 EACH | Refills: 3 | Status: SHIPPED | OUTPATIENT
Start: 2025-07-24

## 2025-07-24 NOTE — TELEPHONE ENCOUNTER
Rx Refill Note  Requested Prescriptions      No prescriptions requested or ordered in this encounter        Last office visit with prescribing clinician: 2/14/2025      Next office visit with prescribing clinician: Visit date not found       Sadia Doran (Jodi)  07/24/25, 15:29 EDT     Local pharmacies can not get the injection.  Sent to [facility] pharmacy to ship to pt

## 2025-07-25 ENCOUNTER — PRIOR AUTHORIZATION (OUTPATIENT)
Dept: ENDOCRINOLOGY | Facility: CLINIC | Age: 70
End: 2025-07-25
Payer: MEDICARE

## 2025-07-28 NOTE — TELEPHONE ENCOUNTER
Karen Rubio (Key: [id number])  PA Case ID #: [id number]  Need Help? Call us at [phone]  Outcome  Approved on July 26 by [employer] 2017  Approved. This drug has been approved under the Member's Medicare Part D benefit for HYDROCORTISONE SOD SUC (PF) For Solution 100MG. Approved quantity: 3 per 28 day(s). You may fill up to a 90 day supply except for those on Specialty Tier 5, which can be filled up to a 30 day supply. Please call the pharmacy to process the prescription claim.  Effective Date: 7/24/2025  Authorization Expiration Date: 12/31/2099  Drug  Hydrocortisone Sod Suc (PF) 100MG solution  ePA cloud logo  Form  [employer] Electronic Prior Authorization Request Form (2017 NCPDP)

## 2025-08-21 ENCOUNTER — TELEPHONE (OUTPATIENT)
Dept: ENDOCRINOLOGY | Facility: CLINIC | Age: 70
End: 2025-08-21
Payer: MEDICARE